# Patient Record
Sex: FEMALE | Race: BLACK OR AFRICAN AMERICAN | Employment: OTHER | URBAN - METROPOLITAN AREA
[De-identification: names, ages, dates, MRNs, and addresses within clinical notes are randomized per-mention and may not be internally consistent; named-entity substitution may affect disease eponyms.]

---

## 2017-01-16 RX ORDER — AMLODIPINE BESYLATE 5 MG/1
TABLET ORAL
Qty: 90 TABLET | Refills: 0 | Status: SHIPPED | OUTPATIENT
Start: 2017-01-16 | End: 2017-04-12

## 2017-02-07 RX ORDER — TEMAZEPAM 15 MG/1
CAPSULE ORAL
Qty: 30 CAPSULE | Refills: 0 | Status: SHIPPED | OUTPATIENT
Start: 2017-02-07 | End: 2017-02-09

## 2017-02-08 ENCOUNTER — TELEPHONE (OUTPATIENT)
Dept: INTERNAL MEDICINE CLINIC | Facility: CLINIC | Age: 72
End: 2017-02-08

## 2017-02-08 NOTE — TELEPHONE ENCOUNTER
Received a fax from [employer] stating that temazepam is not covered but covered alternative trazodone 50 mg 1 tab qhs is covered. Ok to change RX?

## 2017-02-09 RX ORDER — TRAZODONE HYDROCHLORIDE 50 MG/1
50 TABLET ORAL NIGHTLY
Qty: 30 TABLET | Refills: 0 | Status: SHIPPED | OUTPATIENT
Start: 2017-02-09 | End: 2018-11-29 | Stop reason: ALTCHOICE

## 2017-02-09 NOTE — TELEPHONE ENCOUNTER
RX for trazodone sent to pharmacy. [facility] contacted, notified to cancel RX for temazepam and LM on approved HIPAA number notifying pt of the change in RX.

## 2017-04-10 PROBLEM — E66.01 SEVERE OBESITY (BMI >= 40) (HCC): Chronic | Status: ACTIVE | Noted: 2017-04-10

## 2017-04-10 RX ORDER — TRIAMTERENE AND HYDROCHLOROTHIAZIDE 37.5; 25 MG/1; MG/1
CAPSULE ORAL
Qty: 90 CAPSULE | Refills: 0 | OUTPATIENT
Start: 2017-04-10

## 2017-04-10 RX ORDER — AMLODIPINE BESYLATE 5 MG/1
TABLET ORAL
Qty: 90 TABLET | Refills: 0 | OUTPATIENT
Start: 2017-04-10

## 2017-04-12 RX ORDER — AMLODIPINE BESYLATE 5 MG/1
5 TABLET ORAL
Qty: 90 TABLET | Refills: 0 | Status: SHIPPED | OUTPATIENT
Start: 2017-04-12 | End: 2017-05-08

## 2017-04-14 ENCOUNTER — OFFICE VISIT (OUTPATIENT)
Dept: INTERNAL MEDICINE CLINIC | Facility: CLINIC | Age: 72
End: 2017-04-14

## 2017-04-14 VITALS
WEIGHT: 264 LBS | OXYGEN SATURATION: 99 % | HEIGHT: 64 IN | BODY MASS INDEX: 45.07 KG/M2 | HEART RATE: 61 BPM | RESPIRATION RATE: 16 BRPM | DIASTOLIC BLOOD PRESSURE: 90 MMHG | SYSTOLIC BLOOD PRESSURE: 160 MMHG

## 2017-04-14 DIAGNOSIS — I10 ESSENTIAL HYPERTENSION, BENIGN: Primary | ICD-10-CM

## 2017-04-14 DIAGNOSIS — E78.00 PURE HYPERCHOLESTEROLEMIA: ICD-10-CM

## 2017-04-14 PROCEDURE — 99213 OFFICE O/P EST LOW 20 MIN: CPT | Performed by: INTERNAL MEDICINE

## 2017-04-14 RX ORDER — TRIAMTERENE AND HYDROCHLOROTHIAZIDE 37.5; 25 MG/1; MG/1
CAPSULE ORAL
Qty: 90 CAPSULE | Refills: 0 | Status: SHIPPED | OUTPATIENT
Start: 2017-04-14 | End: 2017-04-20

## 2017-04-14 NOTE — PROGRESS NOTES
Nelli Hughes [facility] 1945 is a 80year old female. Patient presents with:   Follow - Up       HPI:   BP check    Current Outpatient Prescriptions:  Triamterene-HCTZ 37.5-25 MG Oral Cap TAKE 1 CAPSULE BY MOUTH EVERY MORNING Disp: 90 capsule Rfl: 0 Ears: unremarkable. Mouth: unremarkable. Nasal septum: midline. Pharynx: normal.   Sinuses: non-tender. HEART:   Clicks: no.   Distal Pulses Palpable: yes. Edema: none visible . Gallop: no .   Heart sounds: normal S1S2. Murmurs: none.    Rhy

## 2017-04-17 ENCOUNTER — APPOINTMENT (OUTPATIENT)
Dept: LAB | Age: 72
End: 2017-04-17
Attending: INTERNAL MEDICINE
Payer: MEDICARE

## 2017-04-17 DIAGNOSIS — E78.00 PURE HYPERCHOLESTEROLEMIA: ICD-10-CM

## 2017-04-17 DIAGNOSIS — I10 ESSENTIAL HYPERTENSION, BENIGN: ICD-10-CM

## 2017-04-17 PROCEDURE — 80048 BASIC METABOLIC PNL TOTAL CA: CPT

## 2017-04-17 PROCEDURE — 80061 LIPID PANEL: CPT

## 2017-04-17 PROCEDURE — 36415 COLL VENOUS BLD VENIPUNCTURE: CPT

## 2017-04-20 ENCOUNTER — OFFICE VISIT (OUTPATIENT)
Dept: INTERNAL MEDICINE CLINIC | Facility: CLINIC | Age: 72
End: 2017-04-20

## 2017-04-20 VITALS
HEIGHT: 64 IN | WEIGHT: 264 LBS | RESPIRATION RATE: 17 BRPM | DIASTOLIC BLOOD PRESSURE: 78 MMHG | OXYGEN SATURATION: 99 % | BODY MASS INDEX: 45.07 KG/M2 | HEART RATE: 58 BPM | SYSTOLIC BLOOD PRESSURE: 138 MMHG

## 2017-04-20 DIAGNOSIS — E87.6 HYPOKALEMIA: ICD-10-CM

## 2017-04-20 DIAGNOSIS — I10 ESSENTIAL HYPERTENSION, BENIGN: Primary | ICD-10-CM

## 2017-04-20 PROCEDURE — 99213 OFFICE O/P EST LOW 20 MIN: CPT | Performed by: INTERNAL MEDICINE

## 2017-04-20 NOTE — PROGRESS NOTES
Radha Sandoval [facility] 1945 is a 80year old female. Patient presents with: Follow - Up       HPI:   BP check  Patient did not take any medicines today    Current Outpatient Prescriptions:   AmLODIPine Besylate 5 MG Oral Tab Take 1 tablet (5 mg total midline. Pharynx: normal.   Sinuses: non-tender. HEART:   Clicks: no.   Distal Pulses Palpable: yes. Edema: trace. Gallop: no .   Heart sounds: normal S1S2. Murmurs: none. Rhythm: regular. LUNGS:   Airflow: normal air movement.    Auscultation

## 2017-05-08 ENCOUNTER — OFFICE VISIT (OUTPATIENT)
Dept: INTERNAL MEDICINE CLINIC | Facility: CLINIC | Age: 72
End: 2017-05-08

## 2017-05-08 VITALS
BODY MASS INDEX: 46.1 KG/M2 | SYSTOLIC BLOOD PRESSURE: 156 MMHG | OXYGEN SATURATION: 96 % | HEART RATE: 66 BPM | DIASTOLIC BLOOD PRESSURE: 90 MMHG | RESPIRATION RATE: 20 BRPM | WEIGHT: 270 LBS | HEIGHT: 64 IN | TEMPERATURE: 99 F

## 2017-05-08 DIAGNOSIS — Z00.00 ROUTINE GENERAL MEDICAL EXAMINATION AT A HEALTH CARE FACILITY: ICD-10-CM

## 2017-05-08 DIAGNOSIS — I10 ESSENTIAL HYPERTENSION, BENIGN: Primary | ICD-10-CM

## 2017-05-08 PROCEDURE — 99213 OFFICE O/P EST LOW 20 MIN: CPT | Performed by: INTERNAL MEDICINE

## 2017-05-08 RX ORDER — FUROSEMIDE 40 MG/1
40 TABLET ORAL DAILY
Qty: 30 TABLET | Refills: 3 | Status: SHIPPED | OUTPATIENT
Start: 2017-05-08 | End: 2017-09-03

## 2017-05-08 RX ORDER — POTASSIUM CHLORIDE 1500 MG/1
TABLET, FILM COATED, EXTENDED RELEASE ORAL
Qty: 60 TABLET | Refills: 3 | Status: SHIPPED | OUTPATIENT
Start: 2017-05-08 | End: 2017-09-06

## 2017-05-08 RX ORDER — AMLODIPINE BESYLATE 10 MG/1
10 TABLET ORAL
Qty: 30 TABLET | Refills: 2 | Status: SHIPPED | OUTPATIENT
Start: 2017-05-08 | End: 2017-07-31

## 2017-05-08 NOTE — PROGRESS NOTES
Jose Alfredo [facility] 1945 is a 80year old female. Patient presents with: Follow - Up       HPI:   BP check      Current Outpatient Prescriptions: AmLODIPine Besylate 10 MG Oral Tab Take 1 tablet (10 mg total) by mouth once daily.  Disp: 30 tablet 270 lb  BMI 46.32 kg/m2  SpO2 96%  HEENT:   jvp not raised. Ear canals: normal.   Ear drums: normal .   Ears: unremarkable. Mouth: unremarkable. Nasal septum: midline. Pharynx: normal.   Sinuses: non-tender.    HEART:   Clicks: no.   Distal Pulses P

## 2017-05-26 ENCOUNTER — APPOINTMENT (OUTPATIENT)
Dept: LAB | Age: 72
End: 2017-05-26
Attending: INTERNAL MEDICINE
Payer: MEDICARE

## 2017-05-26 DIAGNOSIS — E87.6 HYPOKALEMIA: ICD-10-CM

## 2017-05-26 PROCEDURE — 80048 BASIC METABOLIC PNL TOTAL CA: CPT

## 2017-05-26 PROCEDURE — 36415 COLL VENOUS BLD VENIPUNCTURE: CPT

## 2017-05-30 ENCOUNTER — OFFICE VISIT (OUTPATIENT)
Dept: INTERNAL MEDICINE CLINIC | Facility: CLINIC | Age: 72
End: 2017-05-30

## 2017-05-30 VITALS
BODY MASS INDEX: 44.56 KG/M2 | HEART RATE: 76 BPM | RESPIRATION RATE: 16 BRPM | SYSTOLIC BLOOD PRESSURE: 130 MMHG | WEIGHT: 261 LBS | OXYGEN SATURATION: 99 % | DIASTOLIC BLOOD PRESSURE: 80 MMHG | TEMPERATURE: 98 F | HEIGHT: 64 IN

## 2017-05-30 DIAGNOSIS — I10 ESSENTIAL HYPERTENSION, BENIGN: Primary | ICD-10-CM

## 2017-05-30 PROCEDURE — 99213 OFFICE O/P EST LOW 20 MIN: CPT | Performed by: INTERNAL MEDICINE

## 2017-05-30 NOTE — PROGRESS NOTES
Crystal Holly  1945 is a 80year old female. Patient presents with: Follow - Up: lab       HPI:   BP check      Current Outpatient Prescriptions: AmLODIPine Besylate 10 MG Oral Tab Take 1 tablet (10 mg total) by mouth once daily.  Disp: 30 t Temp(Src) 97.8 °F (36.6 °C) (Oral)  Resp 16  Ht 64\"  Wt 261 lb  BMI 44.78 kg/m2  SpO2 99%  HEENT:   jvp not raised. Ear canals: normal.   Ear drums: normal .   Ears: unremarkable. Mouth: unremarkable. Nasal septum: midline.    Pharynx: normal.   Sinu

## 2017-07-31 DIAGNOSIS — I10 ESSENTIAL HYPERTENSION, BENIGN: ICD-10-CM

## 2017-07-31 RX ORDER — AMLODIPINE BESYLATE 10 MG/1
TABLET ORAL
Qty: 30 TABLET | Refills: 0 | Status: SHIPPED | OUTPATIENT
Start: 2017-07-31 | End: 2017-09-20

## 2017-08-24 ENCOUNTER — TELEPHONE (OUTPATIENT)
Dept: INTERNAL MEDICINE CLINIC | Facility: CLINIC | Age: 72
End: 2017-08-24

## 2017-08-25 ENCOUNTER — OFFICE VISIT (OUTPATIENT)
Dept: INTERNAL MEDICINE CLINIC | Facility: CLINIC | Age: 72
End: 2017-08-25

## 2017-08-25 VITALS
TEMPERATURE: 98 F | HEIGHT: 64 IN | DIASTOLIC BLOOD PRESSURE: 88 MMHG | OXYGEN SATURATION: 97 % | BODY MASS INDEX: 44.56 KG/M2 | HEART RATE: 66 BPM | WEIGHT: 261 LBS | SYSTOLIC BLOOD PRESSURE: 152 MMHG | RESPIRATION RATE: 16 BRPM

## 2017-08-25 DIAGNOSIS — Z00.00 BLOOD TESTS FOR ROUTINE GENERAL PHYSICAL EXAMINATION: ICD-10-CM

## 2017-08-25 DIAGNOSIS — I10 ESSENTIAL HYPERTENSION, BENIGN: Primary | ICD-10-CM

## 2017-08-25 DIAGNOSIS — Z12.39 SCREENING FOR BREAST CANCER: ICD-10-CM

## 2017-08-25 PROCEDURE — 99213 OFFICE O/P EST LOW 20 MIN: CPT | Performed by: INTERNAL MEDICINE

## 2017-08-25 PROCEDURE — 93000 ELECTROCARDIOGRAM COMPLETE: CPT | Performed by: INTERNAL MEDICINE

## 2017-08-25 RX ORDER — LOSARTAN POTASSIUM 50 MG/1
50 TABLET ORAL DAILY
Qty: 30 TABLET | Refills: 2 | Status: SHIPPED | OUTPATIENT
Start: 2017-08-25 | End: 2017-10-01

## 2017-08-25 NOTE — PROGRESS NOTES
Octavio Izquierdo  1945 is a 71year old female. Patient presents with:  Irregular Heart Beat       HPI:   BP check  Patient apparently was set up for colonoscopy on . Patient was called for a preprocedure consultation at the [facility].   Appa Smokeless tobacco: Never Used                      Alcohol use: Yes              Comment: social       REVIEW OF SYSTEMS:   Cardiovascular:   Syncope none. Rapid heart beat at rest no. Change in exercise tolerance no. Chest pain no.  Chest pain while aw about 4 weeks (around 9/22/2017). Bebeto Larry MD

## 2017-08-28 ENCOUNTER — LAB ENCOUNTER (OUTPATIENT)
Dept: LAB | Age: 72
End: 2017-08-28
Attending: INTERNAL MEDICINE
Payer: MEDICARE

## 2017-08-28 DIAGNOSIS — Z00.00 BLOOD TESTS FOR ROUTINE GENERAL PHYSICAL EXAMINATION: ICD-10-CM

## 2017-08-28 LAB
25-HYDROXYVITAMIN D (TOTAL): 47.7 NG/ML (ref 30–100)
ALBUMIN SERPL-MCNC: 3.5 G/DL (ref 3.5–4.8)
ALP LIVER SERPL-CCNC: 75 U/L (ref 55–142)
ALT SERPL-CCNC: 20 U/L (ref 14–54)
AST SERPL-CCNC: 13 U/L (ref 15–41)
BASOPHILS # BLD AUTO: 0.04 X10(3) UL (ref 0–0.1)
BASOPHILS NFR BLD AUTO: 0.7 %
BILIRUB SERPL-MCNC: 0.6 MG/DL (ref 0.1–2)
BILIRUB UR QL STRIP.AUTO: NEGATIVE
BUN BLD-MCNC: 17 MG/DL (ref 8–20)
CALCIUM BLD-MCNC: 9.6 MG/DL (ref 8.3–10.3)
CHLORIDE: 108 MMOL/L (ref 101–111)
CHOLEST SMN-MCNC: 187 MG/DL (ref ?–200)
CO2: 27 MMOL/L (ref 22–32)
COLOR UR AUTO: YELLOW
CREAT BLD-MCNC: 0.89 MG/DL (ref 0.55–1.02)
EOSINOPHIL # BLD AUTO: 0.15 X10(3) UL (ref 0–0.3)
EOSINOPHIL NFR BLD AUTO: 2.6 %
ERYTHROCYTE [DISTWIDTH] IN BLOOD BY AUTOMATED COUNT: 15.5 % (ref 11.5–16)
EST. AVERAGE GLUCOSE BLD GHB EST-MCNC: 114 MG/DL (ref 68–126)
GLUCOSE BLD-MCNC: 85 MG/DL (ref 70–99)
GLUCOSE UR STRIP.AUTO-MCNC: NEGATIVE MG/DL
HBA1C MFR BLD HPLC: 5.6 % (ref ?–5.7)
HCT VFR BLD AUTO: 41.6 % (ref 34–50)
HDLC SERPL-MCNC: 66 MG/DL (ref 45–?)
HDLC SERPL: 2.83 {RATIO} (ref ?–4.44)
HGB BLD-MCNC: 13.1 G/DL (ref 12–16)
IMMATURE GRANULOCYTE COUNT: 0.01 X10(3) UL (ref 0–1)
IMMATURE GRANULOCYTE RATIO %: 0.2 %
KETONES UR STRIP.AUTO-MCNC: NEGATIVE MG/DL
LDLC SERPL CALC-MCNC: 110 MG/DL (ref ?–130)
LDLC SERPL-MCNC: 11 MG/DL (ref 5–40)
LYMPHOCYTES # BLD AUTO: 2.53 X10(3) UL (ref 0.9–4)
LYMPHOCYTES NFR BLD AUTO: 43.6 %
M PROTEIN MFR SERPL ELPH: 7.3 G/DL (ref 6.1–8.3)
MCH RBC QN AUTO: 26.6 PG (ref 27–33.2)
MCHC RBC AUTO-ENTMCNC: 31.5 G/DL (ref 31–37)
MCV RBC AUTO: 84.6 FL (ref 81–100)
MONOCYTES # BLD AUTO: 0.45 X10(3) UL (ref 0.1–0.6)
MONOCYTES NFR BLD AUTO: 7.8 %
NEUTROPHIL ABS PRELIM: 2.62 X10 (3) UL (ref 1.3–6.7)
NEUTROPHILS # BLD AUTO: 2.62 X10(3) UL (ref 1.3–6.7)
NEUTROPHILS NFR BLD AUTO: 45.1 %
NITRITE UR QL STRIP.AUTO: NEGATIVE
NONHDLC SERPL-MCNC: 121 MG/DL (ref ?–130)
PH UR STRIP.AUTO: 5 [PH] (ref 4.5–8)
PLATELET # BLD AUTO: 172 10(3)UL (ref 150–450)
POTASSIUM SERPL-SCNC: 4.2 MMOL/L (ref 3.6–5.1)
PROT UR STRIP.AUTO-MCNC: NEGATIVE MG/DL
RBC # BLD AUTO: 4.92 X10(6)UL (ref 3.8–5.1)
RBC UR QL AUTO: NEGATIVE
RED CELL DISTRIBUTION WIDTH-SD: 48 FL (ref 35.1–46.3)
SODIUM SERPL-SCNC: 143 MMOL/L (ref 136–144)
SP GR UR STRIP.AUTO: 1.02 (ref 1–1.03)
THYROXINE (T4): 9 UG/DL (ref 4.5–10.9)
TRIGLYCERIDES: 55 MG/DL (ref ?–150)
UROBILINOGEN UR STRIP.AUTO-MCNC: <2 MG/DL
WBC # BLD AUTO: 5.8 X10(3) UL (ref 4–13)

## 2017-08-28 PROCEDURE — 82306 VITAMIN D 25 HYDROXY: CPT

## 2017-08-28 PROCEDURE — 81001 URINALYSIS AUTO W/SCOPE: CPT

## 2017-08-28 PROCEDURE — 85025 COMPLETE CBC W/AUTO DIFF WBC: CPT

## 2017-08-28 PROCEDURE — 83036 HEMOGLOBIN GLYCOSYLATED A1C: CPT

## 2017-08-28 PROCEDURE — 84436 ASSAY OF TOTAL THYROXINE: CPT

## 2017-08-28 PROCEDURE — 80061 LIPID PANEL: CPT

## 2017-08-28 PROCEDURE — 80053 COMPREHEN METABOLIC PANEL: CPT

## 2017-08-28 PROCEDURE — 36415 COLL VENOUS BLD VENIPUNCTURE: CPT

## 2017-08-31 ENCOUNTER — OFFICE VISIT (OUTPATIENT)
Dept: INTERNAL MEDICINE CLINIC | Facility: CLINIC | Age: 72
End: 2017-08-31

## 2017-08-31 ENCOUNTER — TELEPHONE (OUTPATIENT)
Dept: INTERNAL MEDICINE CLINIC | Facility: CLINIC | Age: 72
End: 2017-08-31

## 2017-08-31 VITALS
HEIGHT: 64 IN | DIASTOLIC BLOOD PRESSURE: 88 MMHG | HEART RATE: 76 BPM | RESPIRATION RATE: 16 BRPM | TEMPERATURE: 98 F | OXYGEN SATURATION: 98 % | WEIGHT: 261 LBS | BODY MASS INDEX: 44.56 KG/M2 | SYSTOLIC BLOOD PRESSURE: 128 MMHG

## 2017-08-31 DIAGNOSIS — I10 ESSENTIAL HYPERTENSION, BENIGN: Primary | ICD-10-CM

## 2017-08-31 PROCEDURE — 99213 OFFICE O/P EST LOW 20 MIN: CPT | Performed by: INTERNAL MEDICINE

## 2017-08-31 RX ORDER — SODIUM CHLORIDE, SODIUM LACTATE, POTASSIUM CHLORIDE, CALCIUM CHLORIDE 600; 310; 30; 20 MG/100ML; MG/100ML; MG/100ML; MG/100ML
INJECTION, SOLUTION INTRAVENOUS CONTINUOUS
Status: CANCELLED | OUTPATIENT
Start: 2017-08-31

## 2017-08-31 NOTE — TELEPHONE ENCOUNTER
Maureen Baker from [facility] called to request EKG be faxed over to her.  Fax# [phone]

## 2017-08-31 NOTE — PROGRESS NOTES
Leelee Lin [facility] 1945 is a 71year old female. Patient presents with: Follow - Up       HPI:       Current Outpatient Prescriptions:  losartan 50 MG Oral Tab Take 1 tablet (50 mg total) by mouth daily.  Disp: 30 tablet Rfl: 2   AMLODIPINE BESYL Irregular heart beat no. Leg edema no. Murmurs no. Orthopnea no. EXAM:   /88   Pulse 76   Temp 98.1 °F (36.7 °C) (Oral)   Resp 16   Ht 64\"   Wt 261 lb   SpO2 98%   BMI 44.80 kg/m²   HEENT:   jvp not raised.    Ear canals: normal.   Ear drums: no

## 2017-09-01 ENCOUNTER — HOSPITAL ENCOUNTER (OUTPATIENT)
Dept: MAMMOGRAPHY | Age: 72
Discharge: HOME | End: 2017-09-01
Attending: INTERNAL MEDICINE
Payer: MEDICARE

## 2017-09-01 DIAGNOSIS — Z12.39 SCREENING FOR BREAST CANCER: ICD-10-CM

## 2017-09-01 PROCEDURE — 77067 SCR MAMMO BI INCL CAD: CPT | Performed by: INTERNAL MEDICINE

## 2017-09-03 DIAGNOSIS — I10 ESSENTIAL HYPERTENSION, BENIGN: ICD-10-CM

## 2017-09-05 RX ORDER — FUROSEMIDE 40 MG/1
TABLET ORAL
Qty: 90 TABLET | Refills: 0 | Status: SHIPPED | OUTPATIENT
Start: 2017-09-05 | End: 2017-12-07

## 2017-09-06 DIAGNOSIS — I10 ESSENTIAL HYPERTENSION, BENIGN: ICD-10-CM

## 2017-09-06 NOTE — TELEPHONE ENCOUNTER
From: Radha Sandoval  Sent: 9/6/2017 1:32 PM CDT  Subject: Medication Renewal Request    Dory Townsend would like a refill of the following medications:  Potassium Chloride ER 20 MEQ Oral Tab CR Galindo Marie MD]    Preferred pharmacy: [address]

## 2017-09-06 NOTE — TELEPHONE ENCOUNTER
Medication is not part of protocol list. Please approve if appropriate. Last OV 8/31/17. Last refill 5/8/17 #60 with 3 refills.      Last 8/28/17  Potassium 3.6 - 5.1 mmol/L 4.2

## 2017-09-07 DIAGNOSIS — I10 ESSENTIAL HYPERTENSION, BENIGN: ICD-10-CM

## 2017-09-07 RX ORDER — POTASSIUM CHLORIDE 1500 MG/1
TABLET, FILM COATED, EXTENDED RELEASE ORAL
Qty: 60 TABLET | Refills: 0 | OUTPATIENT
Start: 2017-09-07

## 2017-09-07 RX ORDER — POTASSIUM CHLORIDE 1500 MG/1
TABLET, FILM COATED, EXTENDED RELEASE ORAL
Qty: 60 TABLET | Refills: 0 | Status: SHIPPED | OUTPATIENT
Start: 2017-09-07 | End: 2017-10-05

## 2017-09-08 ENCOUNTER — ANESTHESIA EVENT (OUTPATIENT)
Dept: ENDOSCOPY | Facility: HOSPITAL | Age: 72
End: 2017-09-08
Payer: MEDICARE

## 2017-09-14 ENCOUNTER — HOSPITAL ENCOUNTER (OUTPATIENT)
Dept: MAMMOGRAPHY | Facility: HOSPITAL | Age: 72
Discharge: HOME | End: 2017-09-14
Attending: INTERNAL MEDICINE
Payer: MEDICARE

## 2017-09-14 DIAGNOSIS — R92.2 INCONCLUSIVE MAMMOGRAM: ICD-10-CM

## 2017-09-14 PROCEDURE — 77065 DX MAMMO INCL CAD UNI: CPT | Performed by: INTERNAL MEDICINE

## 2017-09-14 NOTE — PROGRESS NOTES
Abnormal   Impression    CONCLUSION:     1. There is a new grouping of calcifications at the 11:00 position, these would be amenable to stereotactic guided biopsy.   2. The results were discussed with the patient at the time of interpretation.     [employer]

## 2017-09-15 ENCOUNTER — OFFICE VISIT (OUTPATIENT)
Dept: INTERNAL MEDICINE CLINIC | Facility: CLINIC | Age: 72
End: 2017-09-15

## 2017-09-15 VITALS
HEIGHT: 64 IN | HEART RATE: 76 BPM | OXYGEN SATURATION: 98 % | SYSTOLIC BLOOD PRESSURE: 138 MMHG | DIASTOLIC BLOOD PRESSURE: 82 MMHG | RESPIRATION RATE: 16 BRPM | TEMPERATURE: 98 F | BODY MASS INDEX: 44.56 KG/M2 | WEIGHT: 261 LBS

## 2017-09-15 DIAGNOSIS — Z00.00 ROUTINE GENERAL MEDICAL EXAMINATION AT A HEALTH CARE FACILITY: Primary | ICD-10-CM

## 2017-09-15 DIAGNOSIS — G47.33 OBSTRUCTIVE SLEEP APNEA: ICD-10-CM

## 2017-09-15 DIAGNOSIS — R92.8 ABNORMAL MAMMOGRAM OF RIGHT BREAST: ICD-10-CM

## 2017-09-15 DIAGNOSIS — E78.00 PURE HYPERCHOLESTEROLEMIA: ICD-10-CM

## 2017-09-15 DIAGNOSIS — I10 ESSENTIAL HYPERTENSION, BENIGN: ICD-10-CM

## 2017-09-15 PROCEDURE — 90686 IIV4 VACC NO PRSV 0.5 ML IM: CPT | Performed by: INTERNAL MEDICINE

## 2017-09-15 PROCEDURE — 90670 PCV13 VACCINE IM: CPT | Performed by: INTERNAL MEDICINE

## 2017-09-15 PROCEDURE — 96160 PT-FOCUSED HLTH RISK ASSMT: CPT | Performed by: INTERNAL MEDICINE

## 2017-09-15 PROCEDURE — G0008 ADMIN INFLUENZA VIRUS VAC: HCPCS | Performed by: INTERNAL MEDICINE

## 2017-09-15 PROCEDURE — G0009 ADMIN PNEUMOCOCCAL VACCINE: HCPCS | Performed by: INTERNAL MEDICINE

## 2017-09-15 PROCEDURE — G0439 PPPS, SUBSEQ VISIT: HCPCS | Performed by: INTERNAL MEDICINE

## 2017-09-15 NOTE — PATIENT INSTRUCTIONS
Elvina Mcburney [facility] SCREENING SCHEDULE   Tests on this list are recommended by your physician but may not be covered, or covered at this frequency, by your insurer. Please check with your insurance carrier before scheduling to verify coverage.        PREV 09/15/17  -PNEUMOCOCCAL VACC, 13 JOSELIN IM    Update Immunization Activity if applicable    Hepatitis B No orders found for this or any previous visit. Update Immunization Activity if applicable    Tetanus No orders found for this or any previous visit.  Rikki Houston

## 2017-09-15 NOTE — PROGRESS NOTES
Jalil Freeman is a 71year old female who presents for a Medicare Annual Wellness visit.     female    Patient Care Team: Patient Care Team:  Darcy Morales MD as PCP - General (Internal Medicine)    Patient Active Problem List:     Pure hypercholestero Lab Results  Component Value Date   TRIG 55 08/28/2017   TRIG 70 04/17/2017   TRIG 89 09/02/2016       Lab Results  Component Value Date    08/28/2017    04/17/2017    09/02/2016       Lab Results  Component Value Date   AST 13 ( more medical conditions?: 1-Yes    Have you fallen in the last 12 months?: 0-No    Do you accidently lose urine?: 0-No    Do you have difficulty seeing?: 0-No    Do you have any difficulty walking or getting up?: 0-No    Do you have any tripping hazards?: visit. No flowsheet data found. Fecal Occult Blood Annually No results found for: FOB, OCCULTSTOOL No flowsheet data found.     Glaucoma Screening      Ophthalmology Visit Annually yes    Bone Density Screening      Dexascan Every two years Last Dexa Sc Creat/alb ratio  Annually      LDL  Annually LDL Cholesterol (mg/dL)   Date Value   08/28/2017 110    No flowsheet data found. Dilated Eye exam  Annually No flowsheet data found. No flowsheet data found.     COPD      Spirometry Testing Annually No res CHOLECYSTECTOMY  1973: HYSTERECTOMY      Comment: partial hystero.   1973: OOPHORECTOMY  07/19/2013: XR DEXA BONE DENSITY, AXIAL & SCAN OF WRIST(CP*   Family History   Problem Relation Age of Onset   • Cancer Sister      breast   • Breast Cancer Sister 59 Breast lumps or discharge none. Mammogram up-to-date yes. Await biopsy  Genitourinary:   Patient denies difficulty urinating, frequent urination, hematuria. Dysuria none. Nocturia None. Urinary frequency no. Urinary incontinence no.    Musculoskeletal:   Ar Narrow dion pharynx   Pupils: normal, bilaterally . Sclera: normal.   Turbinates: normal.   NECK:   Carotid bruit: none. Cervical lymph nodes: unremarkable. JVD: none. Range of Motion: normal.   Thyroid: unremarkable. HEART:   Clicks: absent .    E INFLUENZA VIRUS VACCINE, QUAD, PRESERVATIVE FREE, 0.5 ML    Pure hypercholesterolemia-stable on medicines    Essential hypertension, benign-stable  Obstructive sleep apnea-stable on  ASHWINI mask    Abnormal mammogram of right breast  -     MCKENZIE BIOPSY STEREOTA

## 2017-09-20 ENCOUNTER — HOSPITAL ENCOUNTER (OUTPATIENT)
Dept: MAMMOGRAPHY | Facility: HOSPITAL | Age: 72
Discharge: HOME | End: 2017-09-20
Attending: INTERNAL MEDICINE
Payer: MEDICARE

## 2017-09-20 DIAGNOSIS — R92.8 ABNORMAL MAMMOGRAM OF RIGHT BREAST: ICD-10-CM

## 2017-09-20 DIAGNOSIS — I10 ESSENTIAL HYPERTENSION, BENIGN: ICD-10-CM

## 2017-09-20 PROCEDURE — 88305 TISSUE EXAM BY PATHOLOGIST: CPT | Performed by: INTERNAL MEDICINE

## 2017-09-20 PROCEDURE — 19081 BX BREAST 1ST LESION STRTCTC: CPT | Performed by: INTERNAL MEDICINE

## 2017-09-20 RX ORDER — AMLODIPINE BESYLATE 10 MG/1
TABLET ORAL
Qty: 30 TABLET | Refills: 0 | Status: SHIPPED | OUTPATIENT
Start: 2017-09-20 | End: 2017-10-01

## 2017-09-21 ENCOUNTER — PATIENT MESSAGE (OUTPATIENT)
Dept: INTERNAL MEDICINE CLINIC | Facility: CLINIC | Age: 72
End: 2017-09-21

## 2017-09-21 ENCOUNTER — TELEPHONE (OUTPATIENT)
Dept: INTERNAL MEDICINE CLINIC | Facility: CLINIC | Age: 72
End: 2017-09-21

## 2017-09-21 NOTE — TELEPHONE ENCOUNTER
Pt called in stating that her R deltoid is red, swollen and warm to the touch. Denies SOB/wheezing/swelling of the face, lips or tongue. Approximately the size of \"2 quarters\".  Advised to use warm compressed, OTC, Tylenol or Advil for aches or pains

## 2017-09-21 NOTE — TELEPHONE ENCOUNTER
From: Jalil Freeman  To: Darcy Morales MD  Sent: 9/21/2017 1:55 PM CDT  Subject: Non-Urgent Medical Question    My right arm where I got a shot is red , puffy ,hot, slightly sore and itchy. . I don't know if it's the Flu or Pneumonia shot because I got

## 2017-09-22 ENCOUNTER — TELEPHONE (OUTPATIENT)
Dept: INTERNAL MEDICINE CLINIC | Facility: CLINIC | Age: 72
End: 2017-09-22

## 2017-09-22 ENCOUNTER — TELEPHONE (OUTPATIENT)
Dept: MAMMOGRAPHY | Facility: HOSPITAL | Age: 72
End: 2017-09-22

## 2017-09-22 RX ORDER — CEPHALEXIN 500 MG/1
500 CAPSULE ORAL 3 TIMES DAILY
Qty: 30 CAPSULE | Refills: 0 | Status: SHIPPED | OUTPATIENT
Start: 2017-09-22 | End: 2017-10-02

## 2017-09-22 NOTE — TELEPHONE ENCOUNTER
Rash at the site of her Prevnar vaccine. On examination patient has local induration and inflammation about 2 inches in diameter.   I asked to apply warm compresses take Benadryl over-the-counter recheck on Monday   with send antibiotic in the meantime

## 2017-09-29 ENCOUNTER — SURGERY (OUTPATIENT)
Age: 72
End: 2017-09-29

## 2017-09-29 ENCOUNTER — ANESTHESIA (OUTPATIENT)
Dept: ENDOSCOPY | Facility: HOSPITAL | Age: 72
End: 2017-09-29
Payer: MEDICARE

## 2017-09-29 ENCOUNTER — HOSPITAL ENCOUNTER (OUTPATIENT)
Facility: HOSPITAL | Age: 72
Setting detail: HOSPITAL OUTPATIENT SURGERY
Discharge: HOME | End: 2017-09-29
Attending: STUDENT IN AN ORGANIZED HEALTH CARE EDUCATION/TRAINING PROGRAM | Admitting: STUDENT IN AN ORGANIZED HEALTH CARE EDUCATION/TRAINING PROGRAM
Payer: MEDICARE

## 2017-09-29 VITALS
SYSTOLIC BLOOD PRESSURE: 141 MMHG | RESPIRATION RATE: 18 BRPM | DIASTOLIC BLOOD PRESSURE: 64 MMHG | HEIGHT: 64 IN | WEIGHT: 264 LBS | TEMPERATURE: 98 F | BODY MASS INDEX: 45.07 KG/M2 | OXYGEN SATURATION: 100 % | HEART RATE: 53 BPM

## 2017-09-29 PROCEDURE — 0DBL8ZX EXCISION OF TRANSVERSE COLON, VIA NATURAL OR ARTIFICIAL OPENING ENDOSCOPIC, DIAGNOSTIC: ICD-10-PCS | Performed by: STUDENT IN AN ORGANIZED HEALTH CARE EDUCATION/TRAINING PROGRAM

## 2017-09-29 PROCEDURE — 88305 TISSUE EXAM BY PATHOLOGIST: CPT | Performed by: STUDENT IN AN ORGANIZED HEALTH CARE EDUCATION/TRAINING PROGRAM

## 2017-09-29 PROCEDURE — 0DB68ZX EXCISION OF STOMACH, VIA NATURAL OR ARTIFICIAL OPENING ENDOSCOPIC, DIAGNOSTIC: ICD-10-PCS | Performed by: STUDENT IN AN ORGANIZED HEALTH CARE EDUCATION/TRAINING PROGRAM

## 2017-09-29 RX ORDER — SODIUM CHLORIDE, SODIUM LACTATE, POTASSIUM CHLORIDE, CALCIUM CHLORIDE 600; 310; 30; 20 MG/100ML; MG/100ML; MG/100ML; MG/100ML
INJECTION, SOLUTION INTRAVENOUS CONTINUOUS
Status: DISCONTINUED | OUTPATIENT
Start: 2017-09-29 | End: 2017-09-29

## 2017-09-29 RX ORDER — SODIUM CHLORIDE, SODIUM LACTATE, POTASSIUM CHLORIDE, CALCIUM CHLORIDE 600; 310; 30; 20 MG/100ML; MG/100ML; MG/100ML; MG/100ML
INJECTION, SOLUTION INTRAVENOUS CONTINUOUS
Status: CANCELLED | OUTPATIENT
Start: 2017-09-29

## 2017-09-29 RX ORDER — RANITIDINE 150 MG/1
150 TABLET ORAL EVERY 12 HOURS
Qty: 60 TABLET | Refills: 2 | Status: SHIPPED | OUTPATIENT
Start: 2017-09-29 | End: 2017-12-28

## 2017-09-29 NOTE — OPERATIVE REPORT
Colon operative report  Patient Name: Nathan Duran  Procedure: Colonoscopy with polypectomy  Indication: surveillance  Attending: Jael Moses M.D. Consent: The risks, benefits, and alternatives were discussed with the patient / POA.   Risks included the proximal transverse colon. These were removed with a cold forceps. - Internal hemorrhoids (grade 2). Impression: Findings as above.     Recommendations:     - Repeat colonoscopy in 5 years, based on her health at that time     Shonda Cervantes MD  Sub

## 2017-09-29 NOTE — OPERATIVE REPORT
EGD operative report  Patient Name: Petra Terrazas  Procedure: Esophagogastroduodenoscopy with biopsy   Indication: worsening reflux  Attending: Ruth Burnett M.D. Consent:  The risks, benefits, and alternatives were discussed with the patient / POA. Erosive antral gastritis  Recommendations:    - Await biopsy results   - Continue PPI therapy, once-daily dosing   - Avoid NSAIDs    Mian Young MD

## 2017-09-29 NOTE — H&P
Gastroenterology H/P  I have personally seen and examined the patient. Patient Name: Dmitry Berry  CC: reflux  HPI: Mrs David Arechiga is a 72 yo female with a history of worsening reflux for the last 3 weeks and is also due for her follow-up colonoscopy. Dermatologic: The patient reports no recent rashes or chronic skin disorders            Rheumatologic: The patient reports no history of chronic arthritis, myalgias, arthralgias            Genitourinary:  The patient reports no history of recurrent urinary LABALBU    Impression:   1) Surveillance colonoscopy  2) Worsening GERD  3) Obesity with ASHWINI    Recommendations:    - EGD and colonoscopy with MAc   - The potentially life-threatening complication of sedation, bleeding, and perforation were reviewed along

## 2017-09-29 NOTE — ANESTHESIA PREPROCEDURE EVALUATION
PRE-OP EVALUATION    Patient Name: Madonna Adam    Pre-op Diagnosis: PERSONAL HISTORY OF COLONIC POLYPS, HEARTBURN    Procedure(s):  ESOPHAGOGASTRODUODENOSCOPY (EGD), COLONOSCOPY      Surgeon(s) and Role:     * Tani Espinosa MD - Primary    Pre-o tablet by mouth daily. Take with a meal Disp:  Rfl:        Allergies: Ace Inhibitors      Anesthesia Evaluation    Patient summary reviewed.     Anesthetic Complications  (-) history of anesthetic complications         GI/Hepatic/Renal

## 2017-09-29 NOTE — ANESTHESIA POSTPROCEDURE EVALUATION
2025 [facility] Patient Status:  Hospital Outpatient Surgery   Age/Gender 71year old female MRN [id number]   [facility]. [facility] Funmi Prasad MD   Hosp Day # 0 PCP Wendy Shelton MD       Anesthesia Post-

## 2017-10-01 DIAGNOSIS — I10 ESSENTIAL HYPERTENSION, BENIGN: ICD-10-CM

## 2017-10-02 NOTE — TELEPHONE ENCOUNTER
From: Ari Gates  Sent: 10/1/2017 8:45 AM CDT  Subject: Medication Renewal Request    Eleni Fairchild.  Billie Vaz would like a refill of the following medications:     Potassium Chloride ER 20 MEQ Oral Tab CR Anne Marie Caldwell, PA]   Patient Comment: Please call

## 2017-10-05 ENCOUNTER — PATIENT MESSAGE (OUTPATIENT)
Dept: INTERNAL MEDICINE CLINIC | Facility: CLINIC | Age: 72
End: 2017-10-05

## 2017-10-05 DIAGNOSIS — I10 ESSENTIAL HYPERTENSION, BENIGN: ICD-10-CM

## 2017-10-05 RX ORDER — LOSARTAN POTASSIUM 50 MG/1
50 TABLET ORAL DAILY
Qty: 30 TABLET | Refills: 2 | Status: SHIPPED
Start: 2017-10-05 | End: 2017-12-07

## 2017-10-05 RX ORDER — AMLODIPINE BESYLATE 10 MG/1
10 TABLET ORAL DAILY
Qty: 30 TABLET | Refills: 0 | Status: SHIPPED
Start: 2017-10-05 | End: 2017-12-07

## 2017-10-05 NOTE — TELEPHONE ENCOUNTER
From: Brad Arthur  Sent: 10/1/2017 8:45 AM CDT  Subject: Medication Renewal Request    John Carrasco.  Jamey Lea would like a refill of the following medications:     losartan 50 MG Oral Tab [Abebe Martinez MD]   Patient Comment: Please call in for 60 days instead of 30     AMLODIPINE BESYLATE 10 MG Oral Tab Ryna Brice MD]   Patient Comment: Please call in for 60 days instead of 30    Preferred pharmacy: [address] [place] [address], [address] [facility]          Medication renewals requested in this message routed separately:     Potassium Chloride ER 20 MEQ Oral Tab CR LISA Gonzalez]   Patient Comment: Please call in for 60 days instead of 30

## 2017-10-06 DIAGNOSIS — I10 ESSENTIAL HYPERTENSION, BENIGN: ICD-10-CM

## 2017-10-06 RX ORDER — POTASSIUM CHLORIDE 1500 MG/1
TABLET, FILM COATED, EXTENDED RELEASE ORAL
Qty: 180 TABLET | Refills: 0 | Status: SHIPPED | OUTPATIENT
Start: 2017-10-06 | End: 2018-01-22

## 2017-10-06 RX ORDER — POTASSIUM CHLORIDE 1500 MG/1
TABLET, FILM COATED, EXTENDED RELEASE ORAL
Qty: 60 TABLET | Refills: 0 | Status: SHIPPED | OUTPATIENT
Start: 2017-10-06 | End: 2017-10-06

## 2017-10-06 RX ORDER — POTASSIUM CHLORIDE 1500 MG/1
TABLET, FILM COATED, EXTENDED RELEASE ORAL
Qty: 60 TABLET | Refills: 3 | Status: SHIPPED
Start: 2017-10-06 | End: 2018-01-07

## 2017-10-06 NOTE — TELEPHONE ENCOUNTER
From: Shikha Simmons  To:  Christ Armstrong MD  Sent: 10/5/2017 5:45 PM CDT  Subject: Prescription Question    [facility] has sent over a refill authorization twice and I entered the request on My Chart on Oct 1 and today Oct 5 for a refill for my olivia

## 2017-12-07 DIAGNOSIS — I10 ESSENTIAL HYPERTENSION, BENIGN: ICD-10-CM

## 2017-12-07 RX ORDER — FUROSEMIDE 40 MG/1
TABLET ORAL
Qty: 90 TABLET | Refills: 0 | Status: SHIPPED | OUTPATIENT
Start: 2017-12-07 | End: 2018-03-08

## 2017-12-07 RX ORDER — LOSARTAN POTASSIUM 50 MG/1
TABLET ORAL
Qty: 90 TABLET | Refills: 2 | Status: SHIPPED | OUTPATIENT
Start: 2017-12-07 | End: 2018-08-24

## 2017-12-07 RX ORDER — AMLODIPINE BESYLATE 10 MG/1
TABLET ORAL
Qty: 90 TABLET | Refills: 0 | Status: SHIPPED | OUTPATIENT
Start: 2017-12-07 | End: 2018-03-08

## 2018-01-07 DIAGNOSIS — I10 ESSENTIAL HYPERTENSION, BENIGN: ICD-10-CM

## 2018-01-11 RX ORDER — POTASSIUM CHLORIDE 1500 MG/1
TABLET, FILM COATED, EXTENDED RELEASE ORAL
Qty: 60 TABLET | Refills: 3 | Status: SHIPPED
Start: 2018-01-11 | End: 2018-01-22

## 2018-01-12 NOTE — TELEPHONE ENCOUNTER
From: Nelli Hughes  Sent: 1/7/2018 9:04 AM CST  Subject: Medication Renewal Request    Rolin Dubin.  Neal Headings would like a refill of the following medications:     Potassium Chloride ER 20 MEQ Oral Tab CR [Abebe Martinez MD]   Patient Comment: My insurance

## 2018-01-16 ENCOUNTER — TELEPHONE (OUTPATIENT)
Dept: INTERNAL MEDICINE CLINIC | Facility: CLINIC | Age: 73
End: 2018-01-16

## 2018-01-16 NOTE — TELEPHONE ENCOUNTER
Receive a call from insurance company regarding PA for potassium chlor er 20 meq, stating that insurance plan no longer covers medication and PA will need to be done. Please contact [employer] at [phone], option #3, ref [id number].

## 2018-01-22 ENCOUNTER — TELEPHONE (OUTPATIENT)
Dept: INTERNAL MEDICINE CLINIC | Facility: CLINIC | Age: 73
End: 2018-01-22

## 2018-01-22 ENCOUNTER — OFFICE VISIT (OUTPATIENT)
Dept: INTERNAL MEDICINE CLINIC | Facility: CLINIC | Age: 73
End: 2018-01-22

## 2018-01-22 VITALS
BODY MASS INDEX: 47.63 KG/M2 | DIASTOLIC BLOOD PRESSURE: 90 MMHG | HEART RATE: 62 BPM | RESPIRATION RATE: 16 BRPM | WEIGHT: 279 LBS | HEIGHT: 64 IN | OXYGEN SATURATION: 98 % | TEMPERATURE: 99 F | SYSTOLIC BLOOD PRESSURE: 144 MMHG

## 2018-01-22 DIAGNOSIS — I10 ESSENTIAL HYPERTENSION, BENIGN: ICD-10-CM

## 2018-01-22 DIAGNOSIS — I10 ESSENTIAL HYPERTENSION, BENIGN: Primary | ICD-10-CM

## 2018-01-22 PROCEDURE — 99213 OFFICE O/P EST LOW 20 MIN: CPT | Performed by: INTERNAL MEDICINE

## 2018-01-22 RX ORDER — POTASSIUM CHLORIDE 750 MG/1
20 TABLET, FILM COATED, EXTENDED RELEASE ORAL DAILY
Qty: 180 TABLET | Refills: 0 | Status: SHIPPED | OUTPATIENT
Start: 2018-01-22 | End: 2018-01-22

## 2018-01-22 RX ORDER — POTASSIUM CHLORIDE 750 MG/1
20 CAPSULE, EXTENDED RELEASE ORAL DAILY
Qty: 180 CAPSULE | Refills: 0 | Status: CANCELLED | OUTPATIENT
Start: 2018-01-22

## 2018-01-22 NOTE — PROGRESS NOTES
Zachary Pendleton  1945 is a 71year old female. Patient presents with:   Follow - Up       HPI:   Patient here to get a replacement of her medicines   insurance would not cover extended release 20 meq    Current Outpatient Prescriptions:  Melanie REVIEW OF SYSTEMS:   Cardiovascular:   Syncope none. Rapid heart beat at rest no. Change in exercise tolerance no. Chest pain no. Chest pain while awake none. Cold extremities no. Dizziness no. Dyspnea on exertion none. Fainting none. Fatigue no.  High

## 2018-01-22 NOTE — TELEPHONE ENCOUNTER
Patient was in for OV today and was prescribed Potassium Chloride ER 10 MG. .. Patient prefers rx to be sent that is NOT the extended release.      Please send new prescription to:  [address], [address]

## 2018-01-22 NOTE — TELEPHONE ENCOUNTER
Patient spoke to insurance company.  Stated that the extended release is okay, however needs CAPSULES instead of tablets

## 2018-01-22 NOTE — TELEPHONE ENCOUNTER
Pt stated that she spoke with insurance and even with PA 20 meq is too expensive. See TE from 1/22/18 for further clarification.

## 2018-01-22 NOTE — TELEPHONE ENCOUNTER
pts insurance will only cover Potassium chloride ER 10 meq capsules. Medication pending if ok to switch.

## 2018-01-27 RX ORDER — POTASSIUM CHLORIDE 750 MG/1
20 CAPSULE, EXTENDED RELEASE ORAL DAILY
Qty: 180 CAPSULE | Refills: 0 | Status: SHIPPED | OUTPATIENT
Start: 2018-01-27 | End: 2018-04-03

## 2018-01-27 NOTE — TELEPHONE ENCOUNTER
Did anyone respond to this?   I did take care of it today but I need to know whether this was addressed earlier

## 2018-01-27 NOTE — TELEPHONE ENCOUNTER
Patient called to follow up on request for new medication and has not received a response.     Needs capsules instead of tablets in order for insurance to cover  Please send new rx

## 2018-01-29 NOTE — TELEPHONE ENCOUNTER
I spoke with pharmacist whom verified that RX for 10 meq tablets was received and insurance company is covering [employer]. LM on approved HIPAA number informing pt that RX is at pharmacy and ready for .      FYI, per pharmacy they have already contacted

## 2018-02-13 ENCOUNTER — PATIENT MESSAGE (OUTPATIENT)
Dept: INTERNAL MEDICINE CLINIC | Facility: CLINIC | Age: 73
End: 2018-02-13

## 2018-02-14 NOTE — TELEPHONE ENCOUNTER
From: Crystal Holly  To: Thomas Sanders MD  Sent: 2/13/2018 12:50 PM CST  Subject: Non-Urgent Medical Question    Doctor Ezio Singh told me to request blood work after I've been on my new Potassium medication for 2 weeks. ..    Can you please put in an orde

## 2018-02-19 ENCOUNTER — LAB ENCOUNTER (OUTPATIENT)
Dept: LAB | Age: 73
End: 2018-02-19
Attending: INTERNAL MEDICINE
Payer: MEDICARE

## 2018-02-19 DIAGNOSIS — I10 ESSENTIAL HYPERTENSION, BENIGN: ICD-10-CM

## 2018-02-19 LAB
BUN BLD-MCNC: 15 MG/DL (ref 8–20)
CALCIUM BLD-MCNC: 9.8 MG/DL (ref 8.3–10.3)
CHLORIDE: 105 MMOL/L (ref 101–111)
CO2: 28 MMOL/L (ref 22–32)
CREAT BLD-MCNC: 0.93 MG/DL (ref 0.55–1.02)
GLUCOSE BLD-MCNC: 84 MG/DL (ref 70–99)
POTASSIUM SERPL-SCNC: 4.3 MMOL/L (ref 3.6–5.1)
SODIUM SERPL-SCNC: 141 MMOL/L (ref 136–144)

## 2018-02-19 PROCEDURE — 36415 COLL VENOUS BLD VENIPUNCTURE: CPT

## 2018-02-19 PROCEDURE — 80048 BASIC METABOLIC PNL TOTAL CA: CPT

## 2018-03-08 DIAGNOSIS — I10 ESSENTIAL HYPERTENSION, BENIGN: ICD-10-CM

## 2018-03-09 RX ORDER — AMLODIPINE BESYLATE 10 MG/1
TABLET ORAL
Qty: 90 TABLET | Refills: 0 | Status: SHIPPED | OUTPATIENT
Start: 2018-03-09 | End: 2018-04-03

## 2018-03-09 RX ORDER — FUROSEMIDE 40 MG/1
TABLET ORAL
Qty: 90 TABLET | Refills: 0 | Status: SHIPPED | OUTPATIENT
Start: 2018-03-09 | End: 2018-04-03

## 2018-04-03 ENCOUNTER — OFFICE VISIT (OUTPATIENT)
Dept: INTERNAL MEDICINE CLINIC | Facility: CLINIC | Age: 73
End: 2018-04-03

## 2018-04-03 ENCOUNTER — LAB ENCOUNTER (OUTPATIENT)
Dept: LAB | Age: 73
End: 2018-04-03
Attending: INTERNAL MEDICINE
Payer: MEDICARE

## 2018-04-03 ENCOUNTER — HOSPITAL ENCOUNTER (OUTPATIENT)
Dept: GENERAL RADIOLOGY | Age: 73
Discharge: HOME | End: 2018-04-03
Attending: INTERNAL MEDICINE
Payer: MEDICARE

## 2018-04-03 VITALS
SYSTOLIC BLOOD PRESSURE: 104 MMHG | DIASTOLIC BLOOD PRESSURE: 78 MMHG | HEART RATE: 90 BPM | WEIGHT: 270 LBS | OXYGEN SATURATION: 97 % | TEMPERATURE: 99 F | RESPIRATION RATE: 16 BRPM | BODY MASS INDEX: 46 KG/M2

## 2018-04-03 DIAGNOSIS — I10 ESSENTIAL HYPERTENSION, BENIGN: ICD-10-CM

## 2018-04-03 DIAGNOSIS — R05.9 COUGH: ICD-10-CM

## 2018-04-03 DIAGNOSIS — R05.9 COUGH: Primary | ICD-10-CM

## 2018-04-03 PROCEDURE — 36415 COLL VENOUS BLD VENIPUNCTURE: CPT

## 2018-04-03 PROCEDURE — 99213 OFFICE O/P EST LOW 20 MIN: CPT | Performed by: INTERNAL MEDICINE

## 2018-04-03 PROCEDURE — 80048 BASIC METABOLIC PNL TOTAL CA: CPT

## 2018-04-03 PROCEDURE — 71046 X-RAY EXAM CHEST 2 VIEWS: CPT | Performed by: INTERNAL MEDICINE

## 2018-04-03 PROCEDURE — 85025 COMPLETE CBC W/AUTO DIFF WBC: CPT

## 2018-04-03 RX ORDER — POTASSIUM CHLORIDE 750 MG/1
CAPSULE, EXTENDED RELEASE ORAL
Qty: 180 CAPSULE | Refills: 0 | COMMUNITY
Start: 2018-04-03 | End: 2018-04-13

## 2018-04-03 RX ORDER — POTASSIUM CHLORIDE 750 MG/1
20 CAPSULE, EXTENDED RELEASE ORAL DAILY
Qty: 180 CAPSULE | Refills: 0 | Status: SHIPPED | OUTPATIENT
Start: 2018-04-03 | End: 2018-04-03

## 2018-04-03 RX ORDER — LEVOFLOXACIN 500 MG/1
500 TABLET, FILM COATED ORAL DAILY
Qty: 10 TABLET | Refills: 0 | Status: SHIPPED | OUTPATIENT
Start: 2018-04-03 | End: 2018-04-13

## 2018-04-03 RX ORDER — CODEINE PHOSPHATE AND GUAIFENESIN 10; 100 MG/5ML; MG/5ML
5 SOLUTION ORAL EVERY 6 HOURS PRN
Qty: 240 ML | Refills: 0 | Status: SHIPPED | OUTPATIENT
Start: 2018-04-03 | End: 2018-04-13

## 2018-04-03 RX ORDER — FUROSEMIDE 40 MG/1
TABLET ORAL
Qty: 90 TABLET | Refills: 0 | COMMUNITY
Start: 2018-04-03 | End: 2018-04-13

## 2018-04-03 RX ORDER — AMLODIPINE BESYLATE 10 MG/1
5 TABLET ORAL
Qty: 90 TABLET | Refills: 0 | COMMUNITY
Start: 2018-04-03 | End: 2018-07-28

## 2018-04-03 NOTE — PROGRESS NOTES
Leelee Lin  1945 is a 71year old female who presents for upper respiratory symptoms    Patient presents with:  Cough  Vertigo        HPI:   Pt reports  respiratory symptoms for few days   returned From [place] with dry cough.        Maggie Gregg Sleep apnea    • Unspecified essential hypertension    • Visual impairment     glasses for distance      Smoking status: Never Smoker                                                              Smokeless tobacco: Never Used                      Alcohol us is asked to Return in about 1 week (around 4/10/2018). Abby Biswas MD

## 2018-04-13 ENCOUNTER — OFFICE VISIT (OUTPATIENT)
Dept: INTERNAL MEDICINE CLINIC | Facility: CLINIC | Age: 73
End: 2018-04-13

## 2018-04-13 VITALS
WEIGHT: 270 LBS | SYSTOLIC BLOOD PRESSURE: 110 MMHG | RESPIRATION RATE: 14 BRPM | OXYGEN SATURATION: 98 % | HEART RATE: 65 BPM | HEIGHT: 64 IN | TEMPERATURE: 98 F | BODY MASS INDEX: 46.1 KG/M2 | DIASTOLIC BLOOD PRESSURE: 78 MMHG

## 2018-04-13 DIAGNOSIS — R05.9 COUGH: Primary | ICD-10-CM

## 2018-04-13 PROCEDURE — 99213 OFFICE O/P EST LOW 20 MIN: CPT | Performed by: INTERNAL MEDICINE

## 2018-04-13 NOTE — PROGRESS NOTES
Teresa Angel  1945 is a 71year old female who presents for upper respiratory symptoms    Patient presents with:   Follow - Up: Est Pt. follow up        HPI:     Much better -pretty much resolved cough      Current Outpatient Prescriptions:  lev GENERAL: feels well otherwise. Denies fever  SKIN: no rashes  EYES:denies eye pain,discharge   HEENT:neg  LUNGS: denies shortness of breath,,expectoration,chest pain,wheezing  CARDIOVASCULAR: denies chest pain on exertion  GI: no nausea or abdominal pain

## 2018-04-21 ENCOUNTER — PATIENT MESSAGE (OUTPATIENT)
Dept: INTERNAL MEDICINE CLINIC | Facility: CLINIC | Age: 73
End: 2018-04-21

## 2018-04-23 RX ORDER — ALPRAZOLAM 0.5 MG/1
0.5 TABLET ORAL 3 TIMES DAILY PRN
Qty: 15 TABLET | Refills: 0 | Status: SHIPPED | OUTPATIENT
Start: 2018-04-23 | End: 2019-05-07

## 2018-04-23 NOTE — TELEPHONE ENCOUNTER
From: Juan Diego Brunner  To: Willy Merlos MD  Sent: 4/21/2018 6:39 PM CDT  Subject: Prescription Question    I have to fly to [place] to see my son who had a car accident and has a traumatic brain injury. .I'm really nervous and have anxiety about flyi

## 2018-04-24 ENCOUNTER — TELEPHONE (OUTPATIENT)
Dept: INTERNAL MEDICINE CLINIC | Facility: CLINIC | Age: 73
End: 2018-04-24

## 2018-06-25 ENCOUNTER — APPOINTMENT (OUTPATIENT)
Dept: GENERAL RADIOLOGY | Age: 73
End: 2018-06-25
Attending: FAMILY MEDICINE
Payer: MEDICARE

## 2018-06-25 ENCOUNTER — APPOINTMENT (OUTPATIENT)
Dept: CT IMAGING | Facility: HOSPITAL | Age: 73
End: 2018-06-25
Attending: EMERGENCY MEDICINE
Payer: MEDICARE

## 2018-06-25 ENCOUNTER — OFFICE VISIT (OUTPATIENT)
Dept: INTERNAL MEDICINE CLINIC | Facility: CLINIC | Age: 73
End: 2018-06-25

## 2018-06-25 ENCOUNTER — HOSPITAL ENCOUNTER (EMERGENCY)
Facility: HOSPITAL | Age: 73
Discharge: HOME | End: 2018-06-25
Attending: EMERGENCY MEDICINE
Payer: MEDICARE

## 2018-06-25 ENCOUNTER — HOSPITAL ENCOUNTER (OUTPATIENT)
Age: 73
Discharge: ANOTHER HOSPITAL | End: 2018-06-25
Attending: FAMILY MEDICINE
Payer: MEDICARE

## 2018-06-25 ENCOUNTER — PRIOR ORIGINAL RECORDS (OUTPATIENT)
Dept: OTHER | Age: 73
End: 2018-06-25

## 2018-06-25 VITALS
WEIGHT: 247 LBS | SYSTOLIC BLOOD PRESSURE: 151 MMHG | OXYGEN SATURATION: 100 % | DIASTOLIC BLOOD PRESSURE: 67 MMHG | TEMPERATURE: 98 F | RESPIRATION RATE: 20 BRPM | HEART RATE: 60 BPM | BODY MASS INDEX: 44 KG/M2

## 2018-06-25 VITALS
SYSTOLIC BLOOD PRESSURE: 157 MMHG | HEART RATE: 75 BPM | TEMPERATURE: 99 F | DIASTOLIC BLOOD PRESSURE: 108 MMHG | WEIGHT: 247 LBS | HEIGHT: 64 IN | OXYGEN SATURATION: 98 % | BODY MASS INDEX: 42.17 KG/M2 | RESPIRATION RATE: 16 BRPM

## 2018-06-25 VITALS
WEIGHT: 246 LBS | RESPIRATION RATE: 20 BRPM | BODY MASS INDEX: 43.59 KG/M2 | HEIGHT: 63 IN | DIASTOLIC BLOOD PRESSURE: 90 MMHG | SYSTOLIC BLOOD PRESSURE: 150 MMHG | HEART RATE: 70 BPM | OXYGEN SATURATION: 99 %

## 2018-06-25 DIAGNOSIS — R07.9 ACUTE CHEST PAIN: Primary | ICD-10-CM

## 2018-06-25 DIAGNOSIS — R79.89 ELEVATED D-DIMER: ICD-10-CM

## 2018-06-25 DIAGNOSIS — R07.9 CHEST PAIN OF UNCERTAIN ETIOLOGY: Primary | ICD-10-CM

## 2018-06-25 DIAGNOSIS — R07.89 OTHER CHEST PAIN: Primary | ICD-10-CM

## 2018-06-25 PROCEDURE — G0463 HOSPITAL OUTPT CLINIC VISIT: HCPCS

## 2018-06-25 PROCEDURE — 80047 BASIC METABLC PNL IONIZED CA: CPT

## 2018-06-25 PROCEDURE — 93005 ELECTROCARDIOGRAM TRACING: CPT

## 2018-06-25 PROCEDURE — 80053 COMPREHEN METABOLIC PANEL: CPT | Performed by: EMERGENCY MEDICINE

## 2018-06-25 PROCEDURE — 71275 CT ANGIOGRAPHY CHEST: CPT | Performed by: EMERGENCY MEDICINE

## 2018-06-25 PROCEDURE — 99205 OFFICE O/P NEW HI 60 MIN: CPT

## 2018-06-25 PROCEDURE — 36415 COLL VENOUS BLD VENIPUNCTURE: CPT

## 2018-06-25 PROCEDURE — 85025 COMPLETE CBC W/AUTO DIFF WBC: CPT | Performed by: FAMILY MEDICINE

## 2018-06-25 PROCEDURE — 84484 ASSAY OF TROPONIN QUANT: CPT | Performed by: EMERGENCY MEDICINE

## 2018-06-25 PROCEDURE — 93010 ELECTROCARDIOGRAM REPORT: CPT

## 2018-06-25 PROCEDURE — 99214 OFFICE O/P EST MOD 30 MIN: CPT

## 2018-06-25 PROCEDURE — 99285 EMERGENCY DEPT VISIT HI MDM: CPT

## 2018-06-25 PROCEDURE — 85025 COMPLETE CBC W/AUTO DIFF WBC: CPT | Performed by: EMERGENCY MEDICINE

## 2018-06-25 PROCEDURE — 85378 FIBRIN DEGRADE SEMIQUANT: CPT | Performed by: FAMILY MEDICINE

## 2018-06-25 PROCEDURE — 84484 ASSAY OF TROPONIN QUANT: CPT

## 2018-06-25 RX ORDER — NITROGLYCERIN 0.4 MG/1
0.4 TABLET SUBLINGUAL ONCE
Status: COMPLETED | OUTPATIENT
Start: 2018-06-25 | End: 2018-06-25

## 2018-06-25 RX ORDER — ASPIRIN 81 MG/1
162 TABLET, CHEWABLE ORAL ONCE
Status: COMPLETED | OUTPATIENT
Start: 2018-06-25 | End: 2018-06-25

## 2018-06-25 NOTE — ED NOTES
Saline lock re-flushed with 0.9%NS 10 ml. Wrapped up with 2\" coban for transfer to the [facility] ED. Pt. Will drive around the corner to her family's home, they will drive her to the ED. Dr. Olamide Dykes is aware.

## 2018-06-25 NOTE — PROGRESS NOTES
Flew down from from [place] 2 weeks ago having some soreness in the chest especially on deep inspiration  Patient advised to go to the emergency room to evaluate and rule out any pulmonary embolus

## 2018-06-25 NOTE — ED INITIAL ASSESSMENT (HPI)
Pt. With on & off Left chest pressure, left shoulder/neck & arm discomfort for 2 days. Pt. States she has been flying on & off (most recently 2 weeks ago) [place]. Pt. States she did get a massage in the past few days. Pt.  Also reports a scratchy throat

## 2018-06-25 NOTE — ED PROVIDER NOTES
Patient Seen in: [facility] Emergency Department    History   Patient presents with:  Dizziness (neurologic)    Stated Complaint: CP with lightheadedness. EKG done.  DDimer elevated    HPI    The patient is a 78-year-old with history of high choleste Surgeon: Maria D John MD;  Location: [facility] ENDOSCOPY  1973: HYSTERECTOMY      Comment: partial hystero.   1973: OOPHORECTOMY  No date: TOTAL ABDOM HYSTERECTOMY  07/19/2013: XR DEXA BONE DENSITY, AXIAL & SCAN OF WRIST(CP*        Smoking st tenderness. Extremities: No deformity, nontender throughout, and normal active range of motion of all 4 extremities. Distal pulses normal and symmetric. No calf swelling, asymmetry, tenderness or cords. Skin: No masses or nodules or abnormalities.   Psy Details as above. CTA shows no evidence of PE. Her EKG, labs are all reassuring. I reviewed the results with her. I recommended admission for observation, serial troponins and likely stress testing in the morning.   However she states that she

## 2018-06-25 NOTE — ED PROVIDER NOTES
Patient Seen in: [facility]    History   Patient presents with:  Chest Pain  Cough/URI    Stated Complaint: chest pain, x2days     HPI    43-year-old female presents with chief complaint of left-sided chest pressure, pain for th are as noted in HPI. Constitutional and vital signs reviewed. All other systems reviewed and negative except as noted above.     Physical Exam   ED Triage Vitals [06/25/18 1233]  BP: 159/67  Pulse: 69  Resp: 20  Temp: 97.9 °F (36.6 °C)  Temp src: Temp for the past 2 days. Recent travel history. CBC, i-STAT unremarkable. Initial troponin. EKG shows first-degree AV block with nonspecific T-wave changes. Sent to ER for further evaluation and treatment rule out ACS vs PE. Elevated d-dimer.   Patient is

## 2018-06-28 ENCOUNTER — TELEPHONE (OUTPATIENT)
Dept: INTERNAL MEDICINE CLINIC | Facility: CLINIC | Age: 73
End: 2018-06-28

## 2018-06-28 DIAGNOSIS — R07.9 CHEST PAIN OF UNCERTAIN ETIOLOGY: ICD-10-CM

## 2018-06-28 DIAGNOSIS — R07.1 CHEST PAIN ON BREATHING: Primary | ICD-10-CM

## 2018-06-28 NOTE — TELEPHONE ENCOUNTER
Patient calling in requesting a referral to see Cardiologist Dr. Memo Vincent     DX: Chest Pain  ( patient went to the ER)     Please call patient with questions/referral status.

## 2018-07-03 RX ORDER — TEMAZEPAM 15 MG/1
15 CAPSULE ORAL NIGHTLY PRN
Qty: 30 CAPSULE | Refills: 0 | OUTPATIENT
Start: 2018-07-03

## 2018-07-03 NOTE — TELEPHONE ENCOUNTER
Refill requested: Temazepam     Failed protocol    **Medication was discontinued as insurance will not cover on 2/7/2017    **Not on med list    **Declined to pharmacy

## 2018-07-05 ENCOUNTER — TELEPHONE (OUTPATIENT)
Dept: INTERNAL MEDICINE CLINIC | Facility: CLINIC | Age: 73
End: 2018-07-05

## 2018-07-05 RX ORDER — TEMAZEPAM 15 MG/1
15 CAPSULE ORAL NIGHTLY PRN
Qty: 30 CAPSULE | Refills: 0 | Status: SHIPPED | OUTPATIENT
Start: 2018-07-05 | End: 2021-12-13

## 2018-07-05 NOTE — TELEPHONE ENCOUNTER
Patient requesting Temazepam 15 mg not on current medication list also patient aware not covered by insurance but will pay out of pocket if PCP will authorize refill, please advise

## 2018-07-06 ENCOUNTER — MYAURORA ACCOUNT LINK (OUTPATIENT)
Dept: OTHER | Age: 73
End: 2018-07-06

## 2018-07-06 ENCOUNTER — PRIOR ORIGINAL RECORDS (OUTPATIENT)
Dept: OTHER | Age: 73
End: 2018-07-06

## 2018-07-17 ENCOUNTER — HOSPITAL ENCOUNTER (OUTPATIENT)
Dept: CV DIAGNOSTICS | Facility: HOSPITAL | Age: 73
Discharge: HOME | End: 2018-07-17
Attending: INTERNAL MEDICINE
Payer: MEDICARE

## 2018-07-17 DIAGNOSIS — R94.31 ABNORMAL ELECTROCARDIOGRAM: ICD-10-CM

## 2018-07-17 DIAGNOSIS — R07.9 CHEST PAIN, UNSPECIFIED TYPE: ICD-10-CM

## 2018-07-17 PROCEDURE — 93017 CV STRESS TEST TRACING ONLY: CPT | Performed by: INTERNAL MEDICINE

## 2018-07-17 PROCEDURE — 93018 CV STRESS TEST I&R ONLY: CPT | Performed by: INTERNAL MEDICINE

## 2018-07-17 PROCEDURE — 78452 HT MUSCLE IMAGE SPECT MULT: CPT | Performed by: INTERNAL MEDICINE

## 2018-07-23 ENCOUNTER — PRIOR ORIGINAL RECORDS (OUTPATIENT)
Dept: OTHER | Age: 73
End: 2018-07-23

## 2018-07-23 LAB
ALBUMIN: 3.4 G/DL
ALKALINE PHOSPHATATE(ALK PHOS): 82 IU/L
BILIRUBIN TOTAL: 0.9 MG/DL
BUN: 11 MG/DL
CHLORIDE: 109 MEQ/L
CREATININE, SERUM: 0.93 MG/DL
GLUCOSE: 82 MG/DL
HEMATOCRIT: 41.1 %
HEMOGLOBIN: 13.4 G/DL
MAGNESIUM: 9.4 MG/DL
PLATELETS: 155 K/UL
POTASSIUM, SERUM: 3.5 MEQ/L
PROTEIN, TOTAL: 7.2 G/DL
RED BLOOD COUNT: 4.89 X 10-6/U
SGOT (AST): 18 IU/L
SGPT (ALT): 20 IU/L
SODIUM: 143 MEQ/L
WHITE BLOOD COUNT: 6.3 X 10-3/U

## 2018-07-25 ENCOUNTER — PRIOR ORIGINAL RECORDS (OUTPATIENT)
Dept: OTHER | Age: 73
End: 2018-07-25

## 2018-07-28 DIAGNOSIS — I10 ESSENTIAL HYPERTENSION, BENIGN: ICD-10-CM

## 2018-07-28 NOTE — TELEPHONE ENCOUNTER
From: Tiffany Rajput  Sent: 7/28/2018 11:00 AM CDT  Subject: Medication Renewal Request    David Leung would like a refill of the following medications:      AmLODIPine Besylate 10 MG Oral Tab   Patient Comment: Dr Cassaundra Eisenmenger reduced it to 5 mg I've be

## 2018-07-29 DIAGNOSIS — I10 ESSENTIAL HYPERTENSION, BENIGN: ICD-10-CM

## 2018-07-30 RX ORDER — AMLODIPINE BESYLATE 10 MG/1
5 TABLET ORAL DAILY
Qty: 90 TABLET | Refills: 0 | Status: SHIPPED
Start: 2018-07-30 | End: 2018-07-30

## 2018-07-30 NOTE — TELEPHONE ENCOUNTER
Medication(s) to Refill:   Pending Prescriptions Disp Refills    AmLODIPine Besylate 10 MG Oral Tab 90 tablet 0     Si.5 tablets (5 mg total).            Last Time Medication was Filled:  2018      Last Office Visit with PCP: 2018    When Roddy Love

## 2018-07-31 RX ORDER — AMLODIPINE BESYLATE 10 MG/1
10 TABLET ORAL DAILY
Qty: 90 TABLET | Refills: 0 | OUTPATIENT
Start: 2018-07-31

## 2018-07-31 NOTE — TELEPHONE ENCOUNTER
**Declined to pharmacy - sent to pharmacy yesterday - duplicate request.    Refill requested: Amlodipine 10 mg       Last refill: 7/30/18 - Amlodipine 5 mg #90 LUIS ARMANDO JANSEN

## 2018-08-12 ENCOUNTER — PATIENT MESSAGE (OUTPATIENT)
Dept: INTERNAL MEDICINE CLINIC | Facility: CLINIC | Age: 73
End: 2018-08-12

## 2018-08-13 RX ORDER — CODEINE PHOSPHATE AND GUAIFENESIN 10; 100 MG/5ML; MG/5ML
5 SOLUTION ORAL EVERY 6 HOURS PRN
Qty: 240 ML | Refills: 0 | Status: SHIPPED | OUTPATIENT
Start: 2018-08-13 | End: 2018-08-23

## 2018-08-13 RX ORDER — LEVOFLOXACIN 500 MG/1
500 TABLET, FILM COATED ORAL DAILY
Qty: 10 TABLET | Refills: 0 | Status: SHIPPED | OUTPATIENT
Start: 2018-08-13 | End: 2018-08-23

## 2018-08-13 NOTE — TELEPHONE ENCOUNTER
From: Madonna Adam  To: Sudheer Angela MD  Sent: 8/12/2018 4:36 PM CDT  Subject: Non-Urgent Medical Question    I've had a scratchy throat for about 3 -4 weeks which causes me to cough. . I'm currently in [place] and can't get in to see Dr. Bryson Lundberg

## 2018-08-13 NOTE — TELEPHONE ENCOUNTER
Spoke with patient to confirm correct pharmacy.  Signed prescriptions for guaifenesin-codeine and levaquin have been faxed to the [facility] [address] in [place], [place] @ [phone] per patient request.

## 2018-08-24 ENCOUNTER — TELEPHONE (OUTPATIENT)
Dept: INTERNAL MEDICINE CLINIC | Facility: CLINIC | Age: 73
End: 2018-08-24

## 2018-08-24 DIAGNOSIS — I10 ESSENTIAL HYPERTENSION, BENIGN: ICD-10-CM

## 2018-08-24 RX ORDER — AMLODIPINE BESYLATE 5 MG/1
5 TABLET ORAL 2 TIMES DAILY
Qty: 90 TABLET | Refills: 0 | COMMUNITY
Start: 2018-08-24 | End: 2018-10-15

## 2018-08-24 NOTE — TELEPHONE ENCOUNTER
Spoke with pt - she states in November provider started Valsartan. Pt has had a dry cough since she started taking valsartan. Pt states that she is still having a dry cough and thinks it might be due to the Losartan she is taking as it is a side effect.  Pt

## 2018-08-24 NOTE — TELEPHONE ENCOUNTER
Unusual but it can happen. To discontinue valsartan. Increase amlodipine to 5 mg twice a day.   See me in a couple of weeks

## 2018-08-24 NOTE — TELEPHONE ENCOUNTER
Pt would like a call back thinks that she is having side affects with LOSARTAN POTASSIUM 50 MG would like to see if an alternative medication can be sent

## 2018-08-24 NOTE — TELEPHONE ENCOUNTER
Pt notified of provider instructions. Medication changes updated. Pt states that she is not able to f/u with Dr. Ria Fall in 2 weeks b/c she is in [place] with her son who had a brain injury. She states she will be back in October.  Pt plans to f/u wit

## 2018-10-05 ENCOUNTER — OFFICE VISIT (OUTPATIENT)
Dept: INTERNAL MEDICINE CLINIC | Facility: CLINIC | Age: 73
End: 2018-10-05

## 2018-10-05 VITALS
BODY MASS INDEX: 45 KG/M2 | SYSTOLIC BLOOD PRESSURE: 172 MMHG | OXYGEN SATURATION: 98 % | WEIGHT: 262 LBS | HEART RATE: 56 BPM | DIASTOLIC BLOOD PRESSURE: 96 MMHG | RESPIRATION RATE: 20 BRPM | TEMPERATURE: 99 F

## 2018-10-05 DIAGNOSIS — R05.9 COUGH: Primary | ICD-10-CM

## 2018-10-05 DIAGNOSIS — I10 ESSENTIAL HYPERTENSION, BENIGN: ICD-10-CM

## 2018-10-05 DIAGNOSIS — Z00.00 LABORATORY EXAMINATION ORDERED AS PART OF A ROUTINE GENERAL MEDICAL EXAMINATION: ICD-10-CM

## 2018-10-05 PROCEDURE — 99214 OFFICE O/P EST MOD 30 MIN: CPT | Performed by: INTERNAL MEDICINE

## 2018-10-05 PROCEDURE — 90653 IIV ADJUVANT VACCINE IM: CPT | Performed by: INTERNAL MEDICINE

## 2018-10-05 PROCEDURE — G0008 ADMIN INFLUENZA VIRUS VAC: HCPCS | Performed by: INTERNAL MEDICINE

## 2018-10-05 RX ORDER — TRIAMTERENE AND HYDROCHLOROTHIAZIDE 37.5; 25 MG/1; MG/1
1 CAPSULE ORAL EVERY MORNING
Qty: 30 CAPSULE | Refills: 11 | Status: SHIPPED | OUTPATIENT
Start: 2018-10-05 | End: 2019-03-06

## 2018-10-05 RX ORDER — ALBUTEROL SULFATE 90 UG/1
2 AEROSOL, METERED RESPIRATORY (INHALATION) EVERY 6 HOURS PRN
Qty: 2 INHALER | Refills: 0 | Status: SHIPPED | OUTPATIENT
Start: 2018-10-05 | End: 2018-12-31

## 2018-10-05 RX ORDER — HYDRALAZINE HYDROCHLORIDE 25 MG/1
25 TABLET, FILM COATED ORAL 3 TIMES DAILY
Qty: 90 TABLET | Refills: 0 | Status: SHIPPED | OUTPATIENT
Start: 2018-10-05 | End: 2018-10-28

## 2018-10-05 RX ORDER — MELATONIN
1000 DAILY
COMMUNITY
End: 2019-12-05 | Stop reason: ALTCHOICE

## 2018-10-05 RX ORDER — PREDNISONE 1 MG/1
TABLET ORAL
Qty: 36 TABLET | Refills: 0 | Status: SHIPPED | OUTPATIENT
Start: 2018-10-05 | End: 2018-11-29 | Stop reason: ALTCHOICE

## 2018-10-05 NOTE — PROGRESS NOTES
Angelita Sheffield  1945 is a 68year old female who presents for upper respiratory symptoms    Patient presents with:  Cough        HPI:   Pt reports  respiratory symptoms for few weeks. Does have cough nonproductive.   Was in [place] went to the  Rfl:       Past Medical History:   Diagnosis Date   • Arthritis of knee    • Esophageal reflux    • High blood pressure    • Hyperlipidemia    • ASHWINI (obstructive sleep apnea)    • Osteoarthritis    • Osteoarthritis of both knees 4/8/2014   • Pes planus (fl T4(THYROXINE TOTAL); Future  -     HEMOGLOBIN A1C; Future  -     LIPID PANEL; Future  -     COMP METABOLIC PANEL (14); Future  -     CBC WITH DIFFERENTIAL WITH PLATELET;  Future  -     URINALYSIS, ROUTINE; Future  -     VITAMIN D, 25-HYDROXY; Future    Es

## 2018-10-08 ENCOUNTER — LAB ENCOUNTER (OUTPATIENT)
Dept: LAB | Age: 73
End: 2018-10-08
Attending: INTERNAL MEDICINE
Payer: MEDICARE

## 2018-10-08 DIAGNOSIS — Z00.00 LABORATORY EXAMINATION ORDERED AS PART OF A ROUTINE GENERAL MEDICAL EXAMINATION: ICD-10-CM

## 2018-10-08 PROCEDURE — 80053 COMPREHEN METABOLIC PANEL: CPT

## 2018-10-08 PROCEDURE — 85025 COMPLETE CBC W/AUTO DIFF WBC: CPT

## 2018-10-08 PROCEDURE — 84443 ASSAY THYROID STIM HORMONE: CPT

## 2018-10-08 PROCEDURE — 83036 HEMOGLOBIN GLYCOSYLATED A1C: CPT

## 2018-10-08 PROCEDURE — 84436 ASSAY OF TOTAL THYROXINE: CPT

## 2018-10-08 PROCEDURE — 81001 URINALYSIS AUTO W/SCOPE: CPT

## 2018-10-08 PROCEDURE — 80061 LIPID PANEL: CPT

## 2018-10-08 PROCEDURE — 82306 VITAMIN D 25 HYDROXY: CPT

## 2018-10-08 PROCEDURE — 36415 COLL VENOUS BLD VENIPUNCTURE: CPT

## 2018-10-08 RX ORDER — HYDRALAZINE HYDROCHLORIDE 25 MG/1
TABLET, FILM COATED ORAL
Qty: 270 TABLET | Refills: 0 | OUTPATIENT
Start: 2018-10-08

## 2018-10-12 ENCOUNTER — OFFICE VISIT (OUTPATIENT)
Dept: INTERNAL MEDICINE CLINIC | Facility: CLINIC | Age: 73
End: 2018-10-12

## 2018-10-12 VITALS
HEIGHT: 63 IN | BODY MASS INDEX: 45.71 KG/M2 | OXYGEN SATURATION: 98 % | TEMPERATURE: 98 F | RESPIRATION RATE: 20 BRPM | WEIGHT: 258 LBS | DIASTOLIC BLOOD PRESSURE: 86 MMHG | SYSTOLIC BLOOD PRESSURE: 136 MMHG | HEART RATE: 68 BPM

## 2018-10-12 DIAGNOSIS — G47.33 OBSTRUCTIVE SLEEP APNEA: ICD-10-CM

## 2018-10-12 DIAGNOSIS — Z00.00 ROUTINE GENERAL MEDICAL EXAMINATION AT A HEALTH CARE FACILITY: Primary | ICD-10-CM

## 2018-10-12 DIAGNOSIS — R92.8 ABNORMAL MAMMOGRAM OF RIGHT BREAST: ICD-10-CM

## 2018-10-12 DIAGNOSIS — I10 ESSENTIAL HYPERTENSION, BENIGN: ICD-10-CM

## 2018-10-12 PROBLEM — R07.9 CHEST PAIN OF UNCERTAIN ETIOLOGY: Status: RESOLVED | Noted: 2018-06-25 | Resolved: 2018-10-12

## 2018-10-12 PROBLEM — R05.9 COUGH: Status: RESOLVED | Noted: 2018-10-05 | Resolved: 2018-10-12

## 2018-10-12 PROCEDURE — G0439 PPPS, SUBSEQ VISIT: HCPCS | Performed by: INTERNAL MEDICINE

## 2018-10-12 PROCEDURE — 96160 PT-FOCUSED HLTH RISK ASSMT: CPT | Performed by: INTERNAL MEDICINE

## 2018-10-12 RX ORDER — BUDESONIDE AND FORMOTEROL FUMARATE DIHYDRATE 160; 4.5 UG/1; UG/1
1 AEROSOL RESPIRATORY (INHALATION) 2 TIMES DAILY
Refills: 0 | COMMUNITY
Start: 2018-10-12 | End: 2018-11-29 | Stop reason: ALTCHOICE

## 2018-10-12 NOTE — PROGRESS NOTES
REASON FOR VISIT:    Paul Avalos is a 68year old female who presents for a Medicare Annual Wellness visit.     female     Patient Care Team: Patient Care Team:  Alexandre Marti MD as PCP - General (Internal Medicine)    Patient Active Problem List: Glucose and HbA1c Latest Ref Rng & Units 10/8/2018 6/25/2018 4/3/2018 2/19/2018 8/28/2017 5/26/2017 4/17/2017   Glucose 70 - 99 mg/dL 95 82 84 84 85 79 79     Cholesterol Latest Ref Rng & Units 10/8/2018 8/28/2017 4/17/2017 9/2/2016 9/2/2015 8/21/201 Yes  Hearing Problems?: No     Functional Status     Hearing Problems?: No  Vision Problems? : No  Difficulty walking?: No  Difficulty dressing or bathing?: No  Problems with daily activities? : No  Memory Problems?: No      Fall/Risk Assessment     Have y 10/08/2018 4.4       Creatinine  Annually Creatinine (mg/dL)   Date Value   10/08/2018 1.06 (H)       Digoxin Serum Conc  Annually No results found for: DIGOXIN          ALLERGIES:     Ace Inhibitors          Coughing  MEDICAL INFORMATION:   Past Medical History    Tobacco Use      Smoking status: Never Smoker      Smokeless tobacco: Never Used    Alcohol use: Yes      Comment: social    Drug use: No       REVIEW OF SYSTEMS:   General/Constitutional:   General able to do usual activities good general state Neurologic:   Patient denies dizziness, fainting, loss of consciousness, weakness. Memory loss none. Tingling/numbness none. Trouble with balance none. Psychiatric:   Patient denies depression, hallucinations, memory loss. Anxiety none.  Insomnia occ  Varicose veins: not present. MUSCULOSKELETAL:   Cervical spines: normal.   L-S spines: normal.   Lower extremity joints: decreased range of motion -severe OA knees.    Upper extremity joints: normal.   NEUROLOGICAL:   Babinski: negative/all reflexes are

## 2018-10-15 DIAGNOSIS — I10 ESSENTIAL HYPERTENSION, BENIGN: ICD-10-CM

## 2018-10-17 DIAGNOSIS — I10 ESSENTIAL HYPERTENSION, BENIGN: ICD-10-CM

## 2018-10-17 RX ORDER — AMLODIPINE BESYLATE 5 MG/1
5 TABLET ORAL 2 TIMES DAILY
Qty: 90 TABLET | Refills: 0 | Status: SHIPPED | OUTPATIENT
Start: 2018-10-17 | End: 2018-11-25

## 2018-10-17 NOTE — TELEPHONE ENCOUNTER
Medication(s) to Refill:   Requested Prescriptions     Pending Prescriptions Disp Refills   • AmLODIPine Besylate 5 MG Oral Tab 90 tablet 0     Sig: Take 1 tablet (5 mg total) by mouth 2 (two) times daily.        Last Time Medication was Filled:  8/24/18

## 2018-10-19 RX ORDER — AMLODIPINE BESYLATE 5 MG/1
TABLET ORAL
Qty: 180 TABLET | Refills: 0 | OUTPATIENT
Start: 2018-10-19

## 2018-10-23 ENCOUNTER — TELEPHONE (OUTPATIENT)
Dept: INTERNAL MEDICINE CLINIC | Facility: CLINIC | Age: 73
End: 2018-10-23

## 2018-10-23 NOTE — TELEPHONE ENCOUNTER
Received incoming fax from [place] [id number] requesting to know if claim for [address] on 7/9/18 was authorized by provider. Noted pt had previous referral from them on 4/6/17 but pt did not get updated referral for this year.  Faxed paper back

## 2018-10-25 ENCOUNTER — HOSPITAL ENCOUNTER (OUTPATIENT)
Dept: MAMMOGRAPHY | Age: 73
Discharge: HOME | End: 2018-10-25
Attending: INTERNAL MEDICINE
Payer: MEDICARE

## 2018-10-25 DIAGNOSIS — R92.8 ABNORMAL MAMMOGRAM OF RIGHT BREAST: ICD-10-CM

## 2018-10-25 PROCEDURE — 77067 SCR MAMMO BI INCL CAD: CPT | Performed by: INTERNAL MEDICINE

## 2018-10-25 PROCEDURE — 77063 BREAST TOMOSYNTHESIS BI: CPT | Performed by: INTERNAL MEDICINE

## 2018-10-28 DIAGNOSIS — I10 ESSENTIAL HYPERTENSION, BENIGN: ICD-10-CM

## 2018-10-30 RX ORDER — HYDRALAZINE HYDROCHLORIDE 25 MG/1
TABLET, FILM COATED ORAL
Qty: 270 TABLET | Refills: 0 | Status: SHIPPED | OUTPATIENT
Start: 2018-10-30 | End: 2018-11-06

## 2018-10-30 NOTE — TELEPHONE ENCOUNTER
Medication(s) to Refill:   Requested Prescriptions     Pending Prescriptions Disp Refills   • HYDRALAZINE HCL 25 MG Oral Tab [Pharmacy Med Name: HYDRALAZINE  25MG TABLETS(ORANGE)] 270 tablet 0     Sig: TAKE 1 TABLET(25 MG) BY MOUTH THREE TIMES DAILY

## 2018-11-06 ENCOUNTER — TELEPHONE (OUTPATIENT)
Dept: INTERNAL MEDICINE CLINIC | Facility: CLINIC | Age: 73
End: 2018-11-06

## 2018-11-06 DIAGNOSIS — I10 ESSENTIAL HYPERTENSION, BENIGN: ICD-10-CM

## 2018-11-06 RX ORDER — HYDRALAZINE HYDROCHLORIDE 25 MG/1
TABLET, FILM COATED ORAL
Qty: 90 TABLET | Refills: 0 | OUTPATIENT
Start: 2018-11-06

## 2018-11-06 RX ORDER — PREDNISONE 1 MG/1
TABLET ORAL
Qty: 36 TABLET | Refills: 0 | Status: SHIPPED | OUTPATIENT
Start: 2018-11-06 | End: 2018-11-29 | Stop reason: ALTCHOICE

## 2018-11-06 RX ORDER — HYDRALAZINE HYDROCHLORIDE 25 MG/1
TABLET, FILM COATED ORAL
Qty: 270 TABLET | Refills: 0 | Status: SHIPPED | OUTPATIENT
Start: 2018-11-06 | End: 2019-01-27

## 2018-11-06 RX ORDER — CODEINE PHOSPHATE AND GUAIFENESIN 10; 100 MG/5ML; MG/5ML
5 SOLUTION ORAL EVERY 6 HOURS PRN
Qty: 240 ML | Refills: 0 | Status: SHIPPED | OUTPATIENT
Start: 2018-11-06 | End: 2018-11-16

## 2018-11-06 NOTE — TELEPHONE ENCOUNTER
Hydralazine Rx was filled on 10/30/18 - Pharmacy is telling patient that it was denied and that they do not have it.

## 2018-11-06 NOTE — TELEPHONE ENCOUNTER
Patient wants to know if Doctor can sent something to the pharmacy for cough, she still has it.  To the [facility] in [place]

## 2018-11-25 DIAGNOSIS — I10 ESSENTIAL HYPERTENSION, BENIGN: ICD-10-CM

## 2018-11-26 DIAGNOSIS — I10 ESSENTIAL HYPERTENSION, BENIGN: ICD-10-CM

## 2018-11-27 RX ORDER — AMLODIPINE BESYLATE 5 MG/1
TABLET ORAL
Qty: 90 TABLET | Refills: 0 | Status: SHIPPED | OUTPATIENT
Start: 2018-11-27 | End: 2018-12-19

## 2018-11-27 RX ORDER — AMLODIPINE BESYLATE 5 MG/1
5 TABLET ORAL 2 TIMES DAILY
Qty: 90 TABLET | Refills: 0 | OUTPATIENT
Start: 2018-11-27

## 2018-11-27 NOTE — TELEPHONE ENCOUNTER
Refill requested:   Requested Prescriptions     Pending Prescriptions Disp Refills   • AMLODIPINE BESYLATE 5 MG Oral Tab [Pharmacy Med Name: AMLODIPINE BESYLATE 5MG TABLETS] 90 tablet 0     Sig: TAKE 1 TABLET(5 MG) BY MOUTH TWICE DAILY       Passed protoco

## 2018-11-27 NOTE — TELEPHONE ENCOUNTER
Pt called for update of refill. Pt is leaving [facility] on Friday and would like to  refill before leaving.

## 2018-11-29 ENCOUNTER — OFFICE VISIT (OUTPATIENT)
Dept: INTERNAL MEDICINE CLINIC | Facility: CLINIC | Age: 73
End: 2018-11-29

## 2018-11-29 VITALS
WEIGHT: 266.5 LBS | OXYGEN SATURATION: 99 % | RESPIRATION RATE: 16 BRPM | HEART RATE: 70 BPM | BODY MASS INDEX: 47 KG/M2 | TEMPERATURE: 98 F | SYSTOLIC BLOOD PRESSURE: 140 MMHG | DIASTOLIC BLOOD PRESSURE: 88 MMHG

## 2018-11-29 DIAGNOSIS — R05.9 COUGH: Primary | ICD-10-CM

## 2018-11-29 PROBLEM — R92.8 ABNORMAL MAMMOGRAM OF RIGHT BREAST: Status: RESOLVED | Noted: 2017-09-15 | Resolved: 2018-11-29

## 2018-11-29 PROCEDURE — 99213 OFFICE O/P EST LOW 20 MIN: CPT | Performed by: INTERNAL MEDICINE

## 2018-11-29 RX ORDER — ALBUTEROL SULFATE 90 UG/1
2 AEROSOL, METERED RESPIRATORY (INHALATION) EVERY 6 HOURS PRN
Qty: 1 INHALER | Refills: 1 | Status: SHIPPED | OUTPATIENT
Start: 2018-11-29 | End: 2018-12-29

## 2018-11-29 RX ORDER — MECLIZINE HCL 12.5 MG/1
12.5 TABLET ORAL 3 TIMES DAILY PRN
Qty: 60 TABLET | Refills: 0 | Status: SHIPPED | OUTPATIENT
Start: 2018-11-29 | End: 2018-12-29

## 2018-11-29 RX ORDER — BUDESONIDE AND FORMOTEROL FUMARATE DIHYDRATE 160; 4.5 UG/1; UG/1
1 AEROSOL RESPIRATORY (INHALATION) 2 TIMES DAILY
Refills: 0 | COMMUNITY
Start: 2018-11-29 | End: 2019-01-14 | Stop reason: ALTCHOICE

## 2018-11-29 NOTE — PROGRESS NOTES
Liset Parra  1945 is a 68year old female who presents for upper respiratory symptoms    Patient presents with:  Cough  Vertigo        HPI:   Pt reports  respiratory symptoms for few weeks. Does have cough nonproductive.   Was in [place] went daily.  Take with a meal Disp:  Rfl:       Past Medical History:   Diagnosis Date   • Arthritis of knee    • Chest pain of uncertain etiology 0/36/4251   • Esophageal reflux    • High blood pressure    • Hyperlipidemia    • ASHWINI (obstructive sleep apnea) time to workup her cough  Patient Instructions   Requesting refill for meclizine.   Patient will return to [facility] December 18 after which workup will be initiated for her cough including blood work helical CT of the chest PFT etc.  Since cough could be

## 2018-11-29 NOTE — PATIENT INSTRUCTIONS
Requesting refill for meclizine.   Patient will return to [facility] December 18 after which workup will be initiated for her cough including blood work helical CT of the chest PFT etc.  Since cough could be multifactorial including allergies obstructive sl

## 2018-12-19 ENCOUNTER — PATIENT MESSAGE (OUTPATIENT)
Dept: INTERNAL MEDICINE CLINIC | Facility: CLINIC | Age: 73
End: 2018-12-19

## 2018-12-19 DIAGNOSIS — I10 ESSENTIAL HYPERTENSION, BENIGN: ICD-10-CM

## 2018-12-19 DIAGNOSIS — G47.33 OBSTRUCTIVE SLEEP APNEA: Primary | Chronic | ICD-10-CM

## 2018-12-20 RX ORDER — AMLODIPINE BESYLATE 5 MG/1
TABLET ORAL
Qty: 90 TABLET | Refills: 0 | Status: SHIPPED | OUTPATIENT
Start: 2018-12-20 | End: 2019-03-03

## 2018-12-20 RX ORDER — AMLODIPINE BESYLATE 5 MG/1
TABLET ORAL
Qty: 90 TABLET | Refills: 0 | OUTPATIENT
Start: 2018-12-20

## 2018-12-20 NOTE — TELEPHONE ENCOUNTER
From: Tj Tiwari  To: Jose M Klein MD  Sent: 12/19/2018 5:02 PM CST  Subject: Referral Request    Please put in a referral for Doctor Gladys Payne I have an appointment on Friday December 21. . They need the referral put in ASAP. Ryan Joiner Thanks

## 2018-12-31 DIAGNOSIS — R05.9 COUGH: ICD-10-CM

## 2019-01-01 NOTE — TELEPHONE ENCOUNTER
Refill requested:   Requested Prescriptions     Pending Prescriptions Disp Refills   • VENTOLIN  (90 Base) MCG/ACT Inhalation Aero Soln [Pharmacy Med Name: VENTOLIN HFA INH W/DOS CTR 200PUFFS] 18 g 0     Sig: INHALE 2 PUFFS INTO THE LUNGS EVERY 6 HO

## 2019-01-14 ENCOUNTER — OFFICE VISIT (OUTPATIENT)
Dept: INTERNAL MEDICINE CLINIC | Facility: CLINIC | Age: 74
End: 2019-01-14
Payer: COMMERCIAL

## 2019-01-14 VITALS
HEIGHT: 64 IN | SYSTOLIC BLOOD PRESSURE: 136 MMHG | DIASTOLIC BLOOD PRESSURE: 84 MMHG | RESPIRATION RATE: 16 BRPM | HEART RATE: 65 BPM | TEMPERATURE: 99 F | OXYGEN SATURATION: 97 % | WEIGHT: 274 LBS | BODY MASS INDEX: 46.78 KG/M2

## 2019-01-14 DIAGNOSIS — R05.9 COUGH: Primary | ICD-10-CM

## 2019-01-14 PROCEDURE — 99213 OFFICE O/P EST LOW 20 MIN: CPT | Performed by: INTERNAL MEDICINE

## 2019-01-14 RX ORDER — RANITIDINE 150 MG/1
TABLET ORAL
Qty: 180 TABLET | Refills: 1 | COMMUNITY
Start: 2019-01-14 | End: 2019-04-19

## 2019-01-14 NOTE — PATIENT INSTRUCTIONS
Wants to  continue present management. If she had no resolution for symptoms and 2-3 months will consider repeat ENT eval possibly allergy testing.   Patient has no symptoms to suggest allergies or any sinus issues at this moment  Also consider further blo

## 2019-01-14 NOTE — PROGRESS NOTES
Liset Parra  1945 is a 68year old female who presents for upper respiratory symptoms    Patient presents with:  Cough        HPI:   Office some better. Did see APN at the sleep lab apparently a PFT was done which was unremarkable.   They could hypertension    • Visual impairment     glasses for distance      Social History    Tobacco Use      Smoking status: Never Smoker      Smokeless tobacco: Never Used    Alcohol use: Yes      Comment: social    Drug use: No        REVIEW OF SYSTEMS:   GENERA

## 2019-01-27 DIAGNOSIS — I10 ESSENTIAL HYPERTENSION, BENIGN: ICD-10-CM

## 2019-01-28 RX ORDER — HYDRALAZINE HYDROCHLORIDE 25 MG/1
TABLET, FILM COATED ORAL
Qty: 270 TABLET | Refills: 0 | Status: SHIPPED | OUTPATIENT
Start: 2019-01-28 | End: 2019-04-25

## 2019-01-28 NOTE — TELEPHONE ENCOUNTER
Last office visit 1/14/19 with Dr. Yaron Alejandro. Last refill 11/6/18 (#270, 0 refills). Last labs 10/8/18.

## 2019-01-29 ENCOUNTER — PATIENT MESSAGE (OUTPATIENT)
Dept: INTERNAL MEDICINE CLINIC | Facility: CLINIC | Age: 74
End: 2019-01-29

## 2019-01-29 DIAGNOSIS — J02.9 SORE THROAT: Primary | ICD-10-CM

## 2019-01-29 NOTE — TELEPHONE ENCOUNTER
From: Paul Avalos  To:  Alexandre Marti MD  Sent: 1/29/2019 12:29 PM CST  Subject: Referral Request    Can I get a referral to see a ENT doctor because I still have the scratchy throat and coughing

## 2019-01-31 NOTE — TELEPHONE ENCOUNTER
Referral pended for approval to see ENT for her continual \"scratchy throat and cough\". Please advise.

## 2019-02-28 VITALS
HEIGHT: 64 IN | SYSTOLIC BLOOD PRESSURE: 134 MMHG | BODY MASS INDEX: 45.24 KG/M2 | WEIGHT: 265 LBS | HEART RATE: 70 BPM | DIASTOLIC BLOOD PRESSURE: 70 MMHG

## 2019-03-03 DIAGNOSIS — I10 ESSENTIAL HYPERTENSION, BENIGN: ICD-10-CM

## 2019-03-04 RX ORDER — AMLODIPINE BESYLATE 5 MG/1
TABLET ORAL
Qty: 180 TABLET | Refills: 0 | Status: SHIPPED | OUTPATIENT
Start: 2019-03-04 | End: 2019-04-01

## 2019-03-04 RX ORDER — PANTOPRAZOLE SODIUM 40 MG/1
TABLET, DELAYED RELEASE ORAL
Qty: 30 TABLET | Refills: 0 | Status: SHIPPED | OUTPATIENT
Start: 2019-03-04 | End: 2019-04-01

## 2019-03-04 RX ORDER — TEMAZEPAM 15 MG/1
CAPSULE ORAL
Qty: 30 CAPSULE | Refills: 0 | Status: SHIPPED | OUTPATIENT
Start: 2019-03-04 | End: 2019-09-05

## 2019-03-04 NOTE — TELEPHONE ENCOUNTER
No protocol    Requesting Pantoprazole Sodium 40 MG Oral Tab EC  LOV: 1/14/19  RTC: 3 months  Last Relevant Labs: 10/8/18  Filled: 1/2/19 #30 with 1 refills      Requesting temazepam 15 MG Oral Cap  Filled: 7/5/18 #30 with 0 refills    No future appointmen

## 2019-03-04 NOTE — TELEPHONE ENCOUNTER
Last OV: 1/14/19 with Dr. Karl Holly  Last refill date: 12/20/18     #/refills: #90, 0 refills  When pt was asked to return for OV: 3 months  Upcoming appt/reason: no upcoming appt  Last labs 10/8/18

## 2019-03-06 DIAGNOSIS — I10 ESSENTIAL HYPERTENSION, BENIGN: ICD-10-CM

## 2019-03-06 RX ORDER — HYDRALAZINE HYDROCHLORIDE 25 MG/1
TABLET, FILM COATED ORAL
Qty: 270 TABLET | Refills: 3 | Status: CANCELLED | OUTPATIENT
Start: 2019-03-06

## 2019-03-06 RX ORDER — TRIAMTERENE AND HYDROCHLOROTHIAZIDE 37.5; 25 MG/1; MG/1
1 CAPSULE ORAL EVERY MORNING
Qty: 90 CAPSULE | Refills: 1 | Status: SHIPPED | OUTPATIENT
Start: 2019-03-06 | End: 2019-07-09

## 2019-03-06 RX ORDER — RANITIDINE 150 MG/1
TABLET ORAL
Qty: 180 TABLET | Refills: 3 | Status: CANCELLED | OUTPATIENT
Start: 2019-03-06

## 2019-03-06 NOTE — TELEPHONE ENCOUNTER
Triamterene-HCTZ sent to mail service. Unable to send other two as pt has enough medications for 2 more months. See [employer] message from 3/6/19.

## 2019-03-06 NOTE — TELEPHONE ENCOUNTER
Aj Manuel, calling from [facility], requesting a refill for the following medications (for pt):    hydrALAzine HCl 25 MG Oral Tab  Triamterene-HCTZ 37.5-25 MG Oral Cap  RANITIDINE  MG Oral Tab    Preferred Pharmacy:  [facility]: [phone]  Fax: [phone]

## 2019-03-09 ENCOUNTER — PATIENT MESSAGE (OUTPATIENT)
Dept: INTERNAL MEDICINE CLINIC | Facility: CLINIC | Age: 74
End: 2019-03-09

## 2019-03-11 NOTE — TELEPHONE ENCOUNTER
From: Margaret Goff  To:  Idania Fajardo MD  Sent: 3/9/2019 1:22 PM CST  Subject: Prescription Question    When can I  the prescription for the following reference refill request on 3/4/19    TEMAZEPAM 15 MG Oral Cap

## 2019-04-01 DIAGNOSIS — I10 ESSENTIAL HYPERTENSION, BENIGN: ICD-10-CM

## 2019-04-01 RX ORDER — AMLODIPINE BESYLATE 5 MG/1
TABLET ORAL
Qty: 180 TABLET | Refills: 2 | Status: SHIPPED | OUTPATIENT
Start: 2019-04-01 | End: 2019-10-12

## 2019-04-01 NOTE — TELEPHONE ENCOUNTER
Protocol passed.      Medication(s) to Refill:   Requested Prescriptions     Pending Prescriptions Disp Refills   • amLODIPine Besylate 5 MG Oral Tab 180 tablet 2     Sig: TAKE 1 TABLET(5 MG) BY MOUTH TWICE DAILY   • Pantoprazole Sodium 40 MG Oral Tab EC 90

## 2019-04-02 RX ORDER — PANTOPRAZOLE SODIUM 40 MG/1
TABLET, DELAYED RELEASE ORAL
Qty: 90 TABLET | Refills: 0 | Status: SHIPPED | OUTPATIENT
Start: 2019-04-02 | End: 2019-05-21

## 2019-04-04 ENCOUNTER — TELEPHONE (OUTPATIENT)
Dept: INTERNAL MEDICINE CLINIC | Facility: CLINIC | Age: 74
End: 2019-04-04

## 2019-04-04 DIAGNOSIS — J32.0 CHRONIC MAXILLARY SINUSITIS: ICD-10-CM

## 2019-04-04 DIAGNOSIS — J32.2 CHRONIC ETHMOIDAL SINUSITIS: Primary | ICD-10-CM

## 2019-04-04 NOTE — TELEPHONE ENCOUNTER
Patient was referred to ENT Dr. Harini Izquierdo, who then advised patient to further test condition and have \"CT SINUS W/ CON\"  Patient states order must entered by PCP

## 2019-04-04 NOTE — TELEPHONE ENCOUNTER
Spoke with pt and advised her that she has a referral for that provider which allows them to place orders.  Pt stated the specialist could place the order but they have the CT's done at their location and that location is not contracted with pt's insurance

## 2019-04-12 ENCOUNTER — HOSPITAL ENCOUNTER (OUTPATIENT)
Dept: CT IMAGING | Age: 74
Discharge: HOME | End: 2019-04-12
Attending: INTERNAL MEDICINE
Payer: MEDICARE

## 2019-04-12 DIAGNOSIS — J32.0 CHRONIC MAXILLARY SINUSITIS: ICD-10-CM

## 2019-04-12 DIAGNOSIS — J32.2 CHRONIC ETHMOIDAL SINUSITIS: ICD-10-CM

## 2019-04-12 PROCEDURE — 70486 CT MAXILLOFACIAL W/O DYE: CPT | Performed by: INTERNAL MEDICINE

## 2019-04-18 DIAGNOSIS — I10 ESSENTIAL HYPERTENSION, BENIGN: ICD-10-CM

## 2019-04-18 RX ORDER — HYDRALAZINE HYDROCHLORIDE 25 MG/1
TABLET, FILM COATED ORAL
Qty: 270 TABLET | Refills: 0 | Status: CANCELLED | OUTPATIENT
Start: 2019-04-18

## 2019-04-23 DIAGNOSIS — I10 ESSENTIAL HYPERTENSION, BENIGN: ICD-10-CM

## 2019-04-24 ENCOUNTER — PATIENT MESSAGE (OUTPATIENT)
Dept: INTERNAL MEDICINE CLINIC | Facility: CLINIC | Age: 74
End: 2019-04-24

## 2019-04-24 DIAGNOSIS — I10 ESSENTIAL HYPERTENSION, BENIGN: ICD-10-CM

## 2019-04-24 NOTE — TELEPHONE ENCOUNTER
From: Bernardo Gaona  To: Jossie Donaldson MD  Sent: 4/24/2019 8:40 AM CDT  Subject: Prescription Question    Can you send my prescription for HYDRALAZINE TAB 25MG and RANITIDINE TAB 150MG to [facility]. .I'm transferring to home delivery from [facility] AT [place]

## 2019-04-25 RX ORDER — HYDRALAZINE HYDROCHLORIDE 25 MG/1
TABLET, FILM COATED ORAL
Qty: 270 TABLET | Refills: 0 | Status: SHIPPED | OUTPATIENT
Start: 2019-04-25 | End: 2019-06-13

## 2019-04-25 NOTE — TELEPHONE ENCOUNTER
Passed protocol    Medication(s) to Refill:   Requested Prescriptions     Pending Prescriptions Disp Refills   • hydrALAzine HCl 25 MG Oral Tab 270 tablet 0     Sig: TAKE 1 TABLET BY MOUTH THREE TIMES DAILY       Last Time Medication was Filled:  1/28/19

## 2019-04-26 RX ORDER — HYDRALAZINE HYDROCHLORIDE 25 MG/1
TABLET, FILM COATED ORAL
Qty: 270 TABLET | Refills: 0 | OUTPATIENT
Start: 2019-04-26

## 2019-04-26 NOTE — TELEPHONE ENCOUNTER
Duplicate request - Rx sent to [facility] on 4/25/19    Medication(s) to Refill:   Requested Prescriptions     Pending Prescriptions Disp Refills   • hydrALAzine HCl 25 MG Oral Tab 270 tablet 0     Sig: TAKE 1 TABLET BY MOUTH THREE TIMES DAILY       Last Time

## 2019-05-03 ENCOUNTER — PATIENT MESSAGE (OUTPATIENT)
Dept: INTERNAL MEDICINE CLINIC | Facility: CLINIC | Age: 74
End: 2019-05-03

## 2019-05-03 DIAGNOSIS — R12 HEARTBURN: Primary | ICD-10-CM

## 2019-05-03 NOTE — TELEPHONE ENCOUNTER
From: Jacob Gunter  To: Isael Lundberg MD  Sent: 5/3/2019 12:10 PM CDT  Subject: Referral Request    I need a referral to see [facility], I received the following message from him on my chart. Hammad Celaya    You are overdue for a follow-up office visit appo

## 2019-05-07 ENCOUNTER — OFFICE VISIT (OUTPATIENT)
Dept: INTERNAL MEDICINE CLINIC | Facility: CLINIC | Age: 74
End: 2019-05-07
Payer: COMMERCIAL

## 2019-05-07 VITALS
TEMPERATURE: 98 F | HEIGHT: 64 IN | BODY MASS INDEX: 42.21 KG/M2 | SYSTOLIC BLOOD PRESSURE: 120 MMHG | RESPIRATION RATE: 16 BRPM | DIASTOLIC BLOOD PRESSURE: 88 MMHG | WEIGHT: 247.25 LBS | OXYGEN SATURATION: 98 % | HEART RATE: 73 BPM

## 2019-05-07 DIAGNOSIS — R05.9 COUGH: Primary | ICD-10-CM

## 2019-05-07 DIAGNOSIS — G47.33 OBSTRUCTIVE SLEEP APNEA: ICD-10-CM

## 2019-05-07 PROCEDURE — 99213 OFFICE O/P EST LOW 20 MIN: CPT | Performed by: INTERNAL MEDICINE

## 2019-05-07 RX ORDER — ALPRAZOLAM 0.5 MG/1
0.5 TABLET ORAL 3 TIMES DAILY PRN
Qty: 15 TABLET | Refills: 0 | Status: SHIPPED | OUTPATIENT
Start: 2019-05-07 | End: 2019-12-30

## 2019-05-07 NOTE — PATIENT INSTRUCTIONS
Patient wants to try over-the-counter Claritin.   She will have the ENT doctor send information over

## 2019-05-07 NOTE — PROGRESS NOTES
Melly Burgos DOB 1945 is a 68year old female who presents for upper respiratory symptoms    Patient presents with:  Cough        HPI:   Office some better. Did see APN at the sleep lab apparently a PFT was done which was unremarkable.   They could with a meal Disp:  Rfl:       Past Medical History:   Diagnosis Date   • Arthritis of knee    • Chest pain of uncertain etiology 9/98/5741   • Esophageal reflux    • High blood pressure    • Hyperlipidemia    • ASHWINI (obstructive sleep apnea)    • Osteoarthr the plan. The patient is asked to Return in about 3 months (around 8/7/2019). Dwayne Hermosillo MD

## 2019-05-22 ENCOUNTER — PATIENT MESSAGE (OUTPATIENT)
Dept: INTERNAL MEDICINE CLINIC | Facility: CLINIC | Age: 74
End: 2019-05-22

## 2019-05-22 DIAGNOSIS — R12 HEARTBURN: Primary | ICD-10-CM

## 2019-05-22 NOTE — TELEPHONE ENCOUNTER
Pantoprazole 40 mg 1 tab daily filled 4/2/19 90 with 0 refills     LOV 5/7/19    Return in about 3 months (around 8/7/2019). .  No upcoming apt on file   Labs 10/8/18  Does not meet protocol

## 2019-05-23 RX ORDER — PANTOPRAZOLE SODIUM 40 MG/1
TABLET, DELAYED RELEASE ORAL
Qty: 90 TABLET | Refills: 0 | Status: SHIPPED | OUTPATIENT
Start: 2019-05-23 | End: 2019-07-11

## 2019-06-12 ENCOUNTER — TELEPHONE (OUTPATIENT)
Dept: INTERNAL MEDICINE CLINIC | Facility: CLINIC | Age: 74
End: 2019-06-12

## 2019-06-12 DIAGNOSIS — R12 HEARTBURN: Primary | ICD-10-CM

## 2019-06-12 NOTE — TELEPHONE ENCOUNTER
GI REFERRAL   Received:  Today   Message Contents   Hanna [facility] Clinical Staff Cc: [facility] Referral Pool   Phone Number: [phone]             .Reason for the order/referral: HEARTBURN   PCP: [facility] [place]   Refer to Provider (first and edy

## 2019-06-12 NOTE — TELEPHONE ENCOUNTER
Spoke with pt as referral was entered and authorized for Dr. Mercedes Ellis. Pt stated that Dr. Johnathon Lopez office cannot see pt until Aug and Dr. Edison Nicole office can see her in July. New referral entered.  Per pt - referral needs to be faxed as soon as auth to [phone]

## 2019-06-12 NOTE — TELEPHONE ENCOUNTER
Referral authorized and correct fax number received from [address] of [phone]. Fax confirmation received. [employer] message sent to pt with update.

## 2019-06-13 DIAGNOSIS — I10 ESSENTIAL HYPERTENSION, BENIGN: ICD-10-CM

## 2019-06-13 DIAGNOSIS — R05.9 COUGH: ICD-10-CM

## 2019-06-13 RX ORDER — HYDRALAZINE HYDROCHLORIDE 25 MG/1
TABLET, FILM COATED ORAL
Qty: 270 TABLET | Refills: 0 | Status: SHIPPED | OUTPATIENT
Start: 2019-06-13 | End: 2019-08-29

## 2019-06-13 RX ORDER — ALBUTEROL SULFATE 90 UG/1
AEROSOL, METERED RESPIRATORY (INHALATION)
Qty: 18 G | Refills: 0 | Status: SHIPPED | OUTPATIENT
Start: 2019-06-13 | End: 2019-08-07

## 2019-06-13 NOTE — TELEPHONE ENCOUNTER
Passed protocol     Last refill:  4/27/2019 #270 NR    LOV:   5/7/2019 Dr Shala Pacheco RTC 3 months     No FOV scheduled

## 2019-06-13 NOTE — TELEPHONE ENCOUNTER
Pt is in [place] and is requesting inhaler for her continued cough.      Failed protocol     Last refill:  1/2/2019 Ventolin Inh 18G NR     5/7/2019 Dr Levi Shine RTC 3 months       No FOV scheduled

## 2019-07-09 DIAGNOSIS — I10 ESSENTIAL HYPERTENSION, BENIGN: ICD-10-CM

## 2019-07-11 RX ORDER — TRIAMTERENE AND HYDROCHLOROTHIAZIDE 37.5; 25 MG/1; MG/1
1 CAPSULE ORAL EVERY MORNING
Qty: 90 CAPSULE | Refills: 1 | Status: SHIPPED | OUTPATIENT
Start: 2019-07-11 | End: 2019-12-05

## 2019-07-11 NOTE — TELEPHONE ENCOUNTER
Passed protocol    Medication(s) to Refill:   Requested Prescriptions     Pending Prescriptions Disp Refills   • TRIAMTERENE-HCTZ 37.5-25 MG Oral Cap [Pharmacy Med Name: TRIAMT-HCTZ 37.5-25MG CAPSULE] 90 capsule 1     Sig: TAKE 1 CAPSULE BY MOUTH  EVERY MO

## 2019-07-12 RX ORDER — PANTOPRAZOLE SODIUM 40 MG/1
TABLET, DELAYED RELEASE ORAL
Qty: 90 TABLET | Refills: 0 | Status: SHIPPED | OUTPATIENT
Start: 2019-07-12 | End: 2019-12-05

## 2019-07-12 NOTE — TELEPHONE ENCOUNTER
No protocol    Requesting Pantoprazole Sodium 40 MG Oral Tab EC  LOV: 5/7/19  RTC: 3 months  Last Relevant Labs: 10/8/18  Filled: 5/23/19 #90 with 0 refills    No future appointments.

## 2019-08-07 ENCOUNTER — OFFICE VISIT (OUTPATIENT)
Dept: INTERNAL MEDICINE CLINIC | Facility: CLINIC | Age: 74
End: 2019-08-07
Payer: COMMERCIAL

## 2019-08-07 VITALS
BODY MASS INDEX: 41.19 KG/M2 | OXYGEN SATURATION: 98 % | WEIGHT: 241.25 LBS | HEIGHT: 64 IN | SYSTOLIC BLOOD PRESSURE: 134 MMHG | RESPIRATION RATE: 12 BRPM | TEMPERATURE: 99 F | HEART RATE: 61 BPM | DIASTOLIC BLOOD PRESSURE: 84 MMHG

## 2019-08-07 DIAGNOSIS — R05.9 COUGH: Primary | ICD-10-CM

## 2019-08-07 PROCEDURE — 99213 OFFICE O/P EST LOW 20 MIN: CPT | Performed by: INTERNAL MEDICINE

## 2019-08-07 RX ORDER — CROMOLYN SODIUM 5.2 MG
1 AEROSOL, SPRAY WITH PUMP (ML) NASAL 4 TIMES DAILY
Qty: 13 ML | Refills: 0 | Status: SHIPPED | OUTPATIENT
Start: 2019-08-07 | End: 2019-12-05 | Stop reason: ALTCHOICE

## 2019-08-07 NOTE — PROGRESS NOTES
Melly Burgos DOB 1945 is a 69year old female who presents for upper respiratory symptoms    Patient presents with:  Cough        HPI:   Office some better. Did see APN at the sleep lab apparently a PFT was done which was unremarkable.   They could needed. Disp:  Rfl:    vitamin E 400 UNITS Oral Cap Take 1 capsule by mouth daily. Disp:  Rfl:    omega-3 fatty acids (FISH OIL) 1000 MG Oral Cap Take 1,000 mg by mouth daily.  Disp:  Rfl:    Calcium 1500 MG Oral Tab Take 1 tablet by mouth 2 (two) times d visit:    Cough  -     Tiotropium Bromide Monohydrate (SPIRIVA HANDIHALER) 18 MCG Inhalation Cap; Inhale 1 capsule (18 mcg total) into the lungs daily. -     Cromolyn Sodium 5.2 MG/ACT Nasal Aero Soln; 1 spray by Nasal route 4 (four) times daily.       Raf Ball

## 2019-08-29 DIAGNOSIS — I10 ESSENTIAL HYPERTENSION, BENIGN: ICD-10-CM

## 2019-08-29 RX ORDER — HYDRALAZINE HYDROCHLORIDE 25 MG/1
TABLET, FILM COATED ORAL
Qty: 270 TABLET | Refills: 0 | Status: SHIPPED | OUTPATIENT
Start: 2019-08-29 | End: 2019-12-05

## 2019-08-29 NOTE — TELEPHONE ENCOUNTER
Passed protocol    Requesting hydrALAzine HCl 25 MG Oral Tab  LOV: 8/7/19  RTC: 3 months  Last Relevant Labs: 10/8/18  Filled: 6/13/19 #270 with 0 refills    No future appointments.

## 2019-09-06 RX ORDER — TEMAZEPAM 15 MG/1
CAPSULE ORAL
Qty: 30 CAPSULE | Refills: 0 | Status: SHIPPED | OUTPATIENT
Start: 2019-09-06 | End: 2020-05-04

## 2019-09-06 NOTE — TELEPHONE ENCOUNTER
No protocol    Requesting TEMAZEPAM 15 MG Oral Cap  LOV: 8/7/19  RTC: 3 months  Last Relevant Labs: 10/08/18  Filled: 3/4/19 #30 with 0 refills    No future appointments.

## 2019-10-12 DIAGNOSIS — I10 ESSENTIAL HYPERTENSION, BENIGN: ICD-10-CM

## 2019-10-14 NOTE — TELEPHONE ENCOUNTER
Failed protocol - labs needed    Requesting amLODIPine Besylate 5 MG Oral Tab  LOV: 8/7/19  RTC: 3 months  Last Relevant Labs: 10/8/18  Filled: 4/1/19 #180 with 2 refills    Future Appointments   Date Time Provider Roberta Pandey   10/31/2019 10:30 AM M

## 2019-10-15 RX ORDER — AMLODIPINE BESYLATE 5 MG/1
TABLET ORAL
Qty: 180 TABLET | Refills: 2 | Status: SHIPPED | OUTPATIENT
Start: 2019-10-15 | End: 2020-05-22

## 2019-10-31 ENCOUNTER — OFFICE VISIT (OUTPATIENT)
Dept: INTERNAL MEDICINE CLINIC | Facility: CLINIC | Age: 74
End: 2019-10-31
Payer: COMMERCIAL

## 2019-10-31 VITALS
RESPIRATION RATE: 16 BRPM | BODY MASS INDEX: 42.12 KG/M2 | HEIGHT: 64 IN | TEMPERATURE: 98 F | DIASTOLIC BLOOD PRESSURE: 74 MMHG | HEART RATE: 78 BPM | OXYGEN SATURATION: 97 % | SYSTOLIC BLOOD PRESSURE: 124 MMHG | WEIGHT: 246.75 LBS

## 2019-10-31 DIAGNOSIS — Z00.00 ROUTINE GENERAL MEDICAL EXAMINATION AT A HEALTH CARE FACILITY: Primary | ICD-10-CM

## 2019-10-31 DIAGNOSIS — E78.00 PURE HYPERCHOLESTEROLEMIA: ICD-10-CM

## 2019-10-31 DIAGNOSIS — G47.33 OBSTRUCTIVE SLEEP APNEA: ICD-10-CM

## 2019-10-31 DIAGNOSIS — R05.9 COUGH: ICD-10-CM

## 2019-10-31 DIAGNOSIS — I10 ESSENTIAL HYPERTENSION, BENIGN: ICD-10-CM

## 2019-10-31 PROCEDURE — 93000 ELECTROCARDIOGRAM COMPLETE: CPT | Performed by: INTERNAL MEDICINE

## 2019-10-31 PROCEDURE — 90662 IIV NO PRSV INCREASED AG IM: CPT | Performed by: INTERNAL MEDICINE

## 2019-10-31 PROCEDURE — 96160 PT-FOCUSED HLTH RISK ASSMT: CPT | Performed by: INTERNAL MEDICINE

## 2019-10-31 PROCEDURE — G0008 ADMIN INFLUENZA VIRUS VAC: HCPCS | Performed by: INTERNAL MEDICINE

## 2019-10-31 PROCEDURE — G0439 PPPS, SUBSEQ VISIT: HCPCS | Performed by: INTERNAL MEDICINE

## 2019-10-31 PROCEDURE — 99397 PER PM REEVAL EST PAT 65+ YR: CPT | Performed by: INTERNAL MEDICINE

## 2019-10-31 NOTE — PROGRESS NOTES
REASON FOR VISIT:    Ricky Schulz is a 69year old female who presents for a Medicare Annual Wellness visit.     female     Patient Care Team: Patient Care Team:  Yodit Pedroza MD as PCP - General (Internal Medicine)    Patient Active Problem List: 8/28/2017 5/26/2017 4/17/2017   Glucose 70 - 99 mg/dL 95 82 84 84 85 79 79     Cholesterol Latest Ref Rng & Units 10/8/2018 8/28/2017 4/17/2017 9/2/2016 9/2/2015 8/21/2014   Total Cholesterol <200 mg/dL 177 187 176 189 197 176   Triglycerides 30 - 149 mg/d Fecal Occult Blood Annually No results found for: FOB, OCCULTSTOOL   Glaucoma Screening     Ophthalmology Visit Annually yes   Immunizations     Zoster (Not covered by Medicare Part B) No orders found for this or any previous visit.      SPECIFIC DISEASE MO FLUARIX QUAD PRESERVE FREE SINGLE DOSE (58754) FLU CLINIC                          11/01/2016      FLUAD High Dose 65 yr and older (30748)                          10/05/2018      FLUZONE 3 Yrs+ Quad Prsv Free 0.5 ml (00960)                          10/16/ Only:   Patient denies breast pain. axillary nodes . Breast lumps or discharge none. Mammogram up-to-date yes. Await biopsy  Genitourinary:   Patient denies difficulty urinating, frequent urination, hematuria. Dysuria none. Nocturia None.  Urinary frequency Breast Mass: no palpable masses. General: unremarkable. Nipple Abnormality: none. Skin Change: none. LUNGS:   Auscultation: clear .    Chest Shape: normal .   Percussion: normal.   Rales: no .   Respiratory effort: normal .   Rhonchi: no.   Wheeze apnea unable to tolerate mask in view of cough    Pure hypercholesterolemia await lab work  -     LIPID PANEL    Cough  -     ALLERGY - INTERNAL    Other orders  -     FLU VACC HIGH DOSE PRSV FREE    EKG shows the patient a heart rate of 76 TX interval 196

## 2019-11-05 ENCOUNTER — APPOINTMENT (OUTPATIENT)
Dept: LAB | Age: 74
End: 2019-11-05
Attending: INTERNAL MEDICINE
Payer: MEDICARE

## 2019-11-18 ENCOUNTER — HOSPITAL ENCOUNTER (OUTPATIENT)
Dept: MAMMOGRAPHY | Age: 74
Discharge: HOME | End: 2019-11-18
Attending: INTERNAL MEDICINE
Payer: MEDICARE

## 2019-11-18 ENCOUNTER — HOSPITAL ENCOUNTER (OUTPATIENT)
Dept: BONE DENSITY | Age: 74
Discharge: HOME | End: 2019-11-18
Attending: INTERNAL MEDICINE
Payer: MEDICARE

## 2019-11-18 DIAGNOSIS — Z00.00 ROUTINE GENERAL MEDICAL EXAMINATION AT A HEALTH CARE FACILITY: ICD-10-CM

## 2019-11-18 PROCEDURE — 77080 DXA BONE DENSITY AXIAL: CPT | Performed by: INTERNAL MEDICINE

## 2019-11-18 PROCEDURE — 77063 BREAST TOMOSYNTHESIS BI: CPT | Performed by: INTERNAL MEDICINE

## 2019-11-18 PROCEDURE — 77067 SCR MAMMO BI INCL CAD: CPT | Performed by: INTERNAL MEDICINE

## 2019-12-05 ENCOUNTER — OFFICE VISIT (OUTPATIENT)
Dept: INTERNAL MEDICINE CLINIC | Facility: CLINIC | Age: 74
End: 2019-12-05
Payer: COMMERCIAL

## 2019-12-05 ENCOUNTER — PATIENT MESSAGE (OUTPATIENT)
Dept: INTERNAL MEDICINE CLINIC | Facility: CLINIC | Age: 74
End: 2019-12-05

## 2019-12-05 VITALS
DIASTOLIC BLOOD PRESSURE: 72 MMHG | HEIGHT: 64 IN | BODY MASS INDEX: 42.08 KG/M2 | TEMPERATURE: 99 F | HEART RATE: 69 BPM | SYSTOLIC BLOOD PRESSURE: 116 MMHG | RESPIRATION RATE: 16 BRPM | WEIGHT: 246.5 LBS | OXYGEN SATURATION: 100 %

## 2019-12-05 DIAGNOSIS — I10 ESSENTIAL HYPERTENSION, BENIGN: ICD-10-CM

## 2019-12-05 DIAGNOSIS — E78.00 PURE HYPERCHOLESTEROLEMIA: ICD-10-CM

## 2019-12-05 DIAGNOSIS — R05.9 COUGH: Primary | ICD-10-CM

## 2019-12-05 PROCEDURE — 99213 OFFICE O/P EST LOW 20 MIN: CPT | Performed by: INTERNAL MEDICINE

## 2019-12-05 RX ORDER — FAMOTIDINE 40 MG/1
40 TABLET, FILM COATED ORAL NIGHTLY PRN
Qty: 90 TABLET | Refills: 3 | Status: SHIPPED | OUTPATIENT
Start: 2019-12-05 | End: 2020-02-24

## 2019-12-05 RX ORDER — CHOLECALCIFEROL (VITAMIN D3) 50 MCG
1 TABLET ORAL DAILY
Qty: 90 TABLET | Refills: 3 | Status: SHIPPED | OUTPATIENT
Start: 2019-12-05 | End: 2020-03-04

## 2019-12-05 NOTE — PROGRESS NOTES
Mabel Hanna  1945 is a 69year old female. Patient presents with:  Test Results      HPI:   Cough much better. Did see allergist.  Here for lab results.   Current Outpatient Medications   Medication Sig Dispense Refill   • Cholecalciferol (VI status: Never Smoker      Smokeless tobacco: Never Used    Alcohol use: Yes      Frequency: Monthly or less      Comment: social    Drug use: No       REVIEW OF SYSTEMS:   na        EXAM:   /72   Pulse 69   Temp 98.5 °F (36.9 °C) (Oral)   Resp 16   H

## 2019-12-06 RX ORDER — HYDRALAZINE HYDROCHLORIDE 25 MG/1
TABLET, FILM COATED ORAL
Qty: 270 TABLET | Refills: 0 | Status: SHIPPED | OUTPATIENT
Start: 2019-12-06 | End: 2020-02-24

## 2019-12-06 RX ORDER — PANTOPRAZOLE SODIUM 40 MG/1
TABLET, DELAYED RELEASE ORAL
Qty: 90 TABLET | Refills: 0 | Status: SHIPPED | OUTPATIENT
Start: 2019-12-06 | End: 2020-03-06

## 2019-12-06 RX ORDER — TRIAMTERENE AND HYDROCHLOROTHIAZIDE 37.5; 25 MG/1; MG/1
1 CAPSULE ORAL EVERY MORNING
Qty: 90 CAPSULE | Refills: 1 | Status: SHIPPED | OUTPATIENT
Start: 2019-12-06 | End: 2020-07-29

## 2019-12-06 NOTE — TELEPHONE ENCOUNTER
HYDRALAZINE HCL 25 MG Oral Tab , PANTOPRAZOLE SODIUM 40 MG Oral Tab EC, and TRIAMTERENE-HCTZ 37.5-25 MG Oral Cap    Last OV relevant to medication: 12-5-2019    Last refill date: 8/29/2019 # 270 tabs with 0 refills     When pt was asked to return for OV: 6

## 2019-12-11 ENCOUNTER — PATIENT MESSAGE (OUTPATIENT)
Dept: INTERNAL MEDICINE CLINIC | Facility: CLINIC | Age: 74
End: 2019-12-11

## 2019-12-11 DIAGNOSIS — R05.9 COUGH: Primary | ICD-10-CM

## 2019-12-11 NOTE — TELEPHONE ENCOUNTER
From: Jacob Gunter  To: Jennifer Erickson MD  Sent: 12/11/2019 3:24 PM CST  Subject: Referral Request    I saw the allergist today he suggest I see an ENT doctor. . Can you send me a referral for an ENT that uses [facility] so the allergist can see his finding

## 2019-12-28 ENCOUNTER — PATIENT MESSAGE (OUTPATIENT)
Dept: INTERNAL MEDICINE CLINIC | Facility: CLINIC | Age: 74
End: 2019-12-28

## 2019-12-30 ENCOUNTER — PATIENT MESSAGE (OUTPATIENT)
Dept: INTERNAL MEDICINE CLINIC | Facility: CLINIC | Age: 74
End: 2019-12-30

## 2019-12-30 RX ORDER — ALPRAZOLAM 0.5 MG/1
0.5 TABLET ORAL 3 TIMES DAILY PRN
Qty: 15 TABLET | Refills: 0 | Status: SHIPPED | OUTPATIENT
Start: 2019-12-30 | End: 2020-01-09

## 2019-12-30 NOTE — TELEPHONE ENCOUNTER
Please review pending rx and advise if ok per pt's request d/t flying. Last refill on 5/7/19 #15. Last OV on 10/31/19 (well visit) RTC 6 months.

## 2019-12-30 NOTE — TELEPHONE ENCOUNTER
From: Sushma Guevara  To: Dejan Celaya MD  Sent: 12/28/2019 10:27 AM CST  Subject: Prescription Question    I will be flying several times in the next couple of months to [place] for my son.  Can the doctor please call in another prescription for Alpra

## 2020-02-23 DIAGNOSIS — I10 ESSENTIAL HYPERTENSION, BENIGN: ICD-10-CM

## 2020-02-24 RX ORDER — HYDRALAZINE HYDROCHLORIDE 25 MG/1
TABLET, FILM COATED ORAL
Qty: 270 TABLET | Refills: 0 | Status: SHIPPED | OUTPATIENT
Start: 2020-02-24 | End: 2020-05-07

## 2020-02-24 RX ORDER — FAMOTIDINE 40 MG/1
40 TABLET, FILM COATED ORAL NIGHTLY PRN
Qty: 90 TABLET | Refills: 3 | OUTPATIENT
Start: 2020-02-24 | End: 2020-05-24

## 2020-02-25 NOTE — TELEPHONE ENCOUNTER
Passed protocol    Refused - famotidine (PEPCID) 40 MG Oral Tab - filled on 12/5/2019 with #90 and 3 refills.      Requesting HYDRALAZINE HCL 25 MG Oral Tab  LOV: 12/5/19  RTC: 6 months  Last Relevant Labs: 11/5/19  Filled: 12/6/19 #270 with 0 refills    No

## 2020-02-26 RX ORDER — FAMOTIDINE 40 MG/1
40 TABLET, FILM COATED ORAL NIGHTLY PRN
Qty: 90 TABLET | Refills: 0 | Status: SHIPPED | OUTPATIENT
Start: 2020-02-26 | End: 2020-02-28

## 2020-03-06 RX ORDER — PANTOPRAZOLE SODIUM 40 MG/1
TABLET, DELAYED RELEASE ORAL
Qty: 90 TABLET | Refills: 0 | Status: SHIPPED | OUTPATIENT
Start: 2020-03-06 | End: 2020-05-22

## 2020-03-06 NOTE — TELEPHONE ENCOUNTER
PANTOPRAZOLE SODIUM 40 MG     Last OV relevant to medication: 12/5/2019    Last refill date: 12-6-2019 # 90 tabs with 0 refills     When pt was asked to return for OV: 6 months     Upcoming appt/reason: none scheduled     Labs: 11-5-2019-lipid,a1c

## 2020-05-04 RX ORDER — TEMAZEPAM 15 MG/1
CAPSULE ORAL
Qty: 30 CAPSULE | Refills: 0 | Status: SHIPPED | OUTPATIENT
Start: 2020-05-04 | End: 2021-01-14

## 2020-05-04 NOTE — TELEPHONE ENCOUNTER
Temazepam 15 mg  Last OV relevant to medication: 10-31-19  Last refill date: 9-6-19 #/refills: 0  When pt was asked to return for OV: 6 mo.    Upcoming appt/reason: none  Recent labs: none

## 2020-05-07 DIAGNOSIS — I10 ESSENTIAL HYPERTENSION, BENIGN: ICD-10-CM

## 2020-05-07 RX ORDER — HYDRALAZINE HYDROCHLORIDE 25 MG/1
TABLET, FILM COATED ORAL
Qty: 270 TABLET | Refills: 0 | Status: SHIPPED | OUTPATIENT
Start: 2020-05-07 | End: 2020-07-29

## 2020-05-21 DIAGNOSIS — I10 ESSENTIAL HYPERTENSION, BENIGN: ICD-10-CM

## 2020-05-22 RX ORDER — AMLODIPINE BESYLATE 5 MG/1
TABLET ORAL
Qty: 180 TABLET | Refills: 2 | Status: SHIPPED | OUTPATIENT
Start: 2020-05-22 | End: 2020-11-30

## 2020-05-22 RX ORDER — PANTOPRAZOLE SODIUM 40 MG/1
TABLET, DELAYED RELEASE ORAL
Qty: 90 TABLET | Refills: 0 | Status: SHIPPED | OUTPATIENT
Start: 2020-05-22 | End: 2020-07-14

## 2020-05-22 NOTE — TELEPHONE ENCOUNTER
Pantoprazole 40mg last filled 3/6/20 #90    Amlodipine  5mg last filled 10/15/19    LOV 12/5/19     Last labs 11/5/19

## 2020-06-30 ENCOUNTER — PATIENT MESSAGE (OUTPATIENT)
Dept: INTERNAL MEDICINE CLINIC | Facility: CLINIC | Age: 75
End: 2020-06-30

## 2020-06-30 DIAGNOSIS — R07.0 THROAT DISCOMFORT: Primary | ICD-10-CM

## 2020-07-01 NOTE — TELEPHONE ENCOUNTER
From: Shilpa Liang  To:  Wander Sequeira MD  Sent: 6/30/2020 3:40 PM CDT  Subject: Referral Request    I still have a scratchy throat for the last couple of years and I'v seen several specialist. Can Dr. Mary Abdul give me referral to see a ENT doctor that us

## 2020-07-06 RX ORDER — FAMOTIDINE 40 MG/1
TABLET, FILM COATED ORAL
Qty: 90 TABLET | Refills: 0 | Status: SHIPPED | OUTPATIENT
Start: 2020-07-06 | End: 2020-09-29

## 2020-07-06 NOTE — TELEPHONE ENCOUNTER
Appointment needed - [employer] message sent to pt to contact office to schedule BP check    Requesting famotidine (PEPCID) 40 MG Oral Tab  LOV: 12/5/19  RTC: 6 months  Last Relevant Labs: 11/5/19  Filled: 2/28/20 #90 with 0 refills    No future appointments.

## 2020-07-14 RX ORDER — PANTOPRAZOLE SODIUM 40 MG/1
TABLET, DELAYED RELEASE ORAL
Qty: 90 TABLET | Refills: 0 | Status: SHIPPED | OUTPATIENT
Start: 2020-07-14 | End: 2020-09-29

## 2020-07-14 NOTE — TELEPHONE ENCOUNTER
Refill requested:   Requested Prescriptions     Pending Prescriptions Disp Refills   • Pantoprazole Sodium 40 MG Oral Tab EC [Pharmacy Med Name: PANTOPRAZOLE SOD 40MG EC TABLET] 90 tablet 0     Sig: TAKE 1 TABLET BY MOUTH IN  THE MORNING BEFORE  BREAKFAST

## 2020-07-27 ENCOUNTER — OFFICE VISIT (OUTPATIENT)
Dept: INTERNAL MEDICINE CLINIC | Facility: CLINIC | Age: 75
End: 2020-07-27
Payer: COMMERCIAL

## 2020-07-27 VITALS
SYSTOLIC BLOOD PRESSURE: 142 MMHG | HEIGHT: 63 IN | BODY MASS INDEX: 47.48 KG/M2 | TEMPERATURE: 98 F | OXYGEN SATURATION: 98 % | HEART RATE: 78 BPM | RESPIRATION RATE: 18 BRPM | DIASTOLIC BLOOD PRESSURE: 82 MMHG | WEIGHT: 268 LBS

## 2020-07-27 DIAGNOSIS — R05.9 COUGH: ICD-10-CM

## 2020-07-27 DIAGNOSIS — I10 ESSENTIAL HYPERTENSION, BENIGN: Primary | Chronic | ICD-10-CM

## 2020-07-27 PROCEDURE — 3008F BODY MASS INDEX DOCD: CPT | Performed by: INTERNAL MEDICINE

## 2020-07-27 PROCEDURE — 3077F SYST BP >= 140 MM HG: CPT | Performed by: INTERNAL MEDICINE

## 2020-07-27 PROCEDURE — 3079F DIAST BP 80-89 MM HG: CPT | Performed by: INTERNAL MEDICINE

## 2020-07-27 PROCEDURE — 99213 OFFICE O/P EST LOW 20 MIN: CPT | Performed by: INTERNAL MEDICINE

## 2020-07-27 NOTE — PROGRESS NOTES
Shilpa Liang [facility] 1945 is a 77year old female.     Patient presents with:  Hypertension  Referral: ENT        HPI:   Patient wants a follow-up ENT consult  Current Outpatient Medications   Medication Sig Dispense Refill   • Pantoprazole Sodium 40 MG Change in exercise tolerance no. Chest pain no. Chest pain while awake none. Cold extremities no. Dizziness no. Dyspnea on exertion none. Fainting none. Fatigue no. High blood pressure on medication(s). Irregular heart beat no. Leg edema no. Murmurs no.  Or

## 2020-07-29 DIAGNOSIS — I10 ESSENTIAL HYPERTENSION, BENIGN: ICD-10-CM

## 2020-07-29 RX ORDER — HYDRALAZINE HYDROCHLORIDE 25 MG/1
TABLET, FILM COATED ORAL
Qty: 270 TABLET | Refills: 1 | Status: SHIPPED | OUTPATIENT
Start: 2020-07-29 | End: 2020-12-28

## 2020-07-29 RX ORDER — TRIAMTERENE AND HYDROCHLOROTHIAZIDE 37.5; 25 MG/1; MG/1
1 CAPSULE ORAL EVERY MORNING
Qty: 90 CAPSULE | Refills: 1 | Status: SHIPPED | OUTPATIENT
Start: 2020-07-29 | End: 2020-08-25

## 2020-07-29 NOTE — TELEPHONE ENCOUNTER
Triamterene-HCTZ 37.5-25 mg  Last OV relevant to medication: 7-27-20  Last refill date: 12-6-19 #/refills: 1  When pt was asked to return for OV: 4 mo.   Upcoming appt/reason: none  Recent labs: 11-5-19: CMP    Hydralazine HCL 25 mg  Last OV relevant to med

## 2020-08-11 ENCOUNTER — PATIENT MESSAGE (OUTPATIENT)
Dept: INTERNAL MEDICINE CLINIC | Facility: CLINIC | Age: 75
End: 2020-08-11

## 2020-08-11 RX ORDER — CODEINE PHOSPHATE AND GUAIFENESIN 10; 100 MG/5ML; MG/5ML
5 SOLUTION ORAL EVERY 6 HOURS PRN
Qty: 240 ML | Refills: 0 | Status: SHIPPED | OUTPATIENT
Start: 2020-08-11 | End: 2020-08-21

## 2020-08-19 ENCOUNTER — PATIENT MESSAGE (OUTPATIENT)
Dept: INTERNAL MEDICINE CLINIC | Facility: CLINIC | Age: 75
End: 2020-08-19

## 2020-08-20 ENCOUNTER — PATIENT MESSAGE (OUTPATIENT)
Dept: INTERNAL MEDICINE CLINIC | Facility: CLINIC | Age: 75
End: 2020-08-20

## 2020-08-20 RX ORDER — LEVOCETIRIZINE DIHYDROCHLORIDE 5 MG/1
5 TABLET, FILM COATED ORAL EVERY EVENING
Qty: 90 TABLET | Refills: 0 | Status: SHIPPED | OUTPATIENT
Start: 2020-08-20 | End: 2020-08-25 | Stop reason: ALTCHOICE

## 2020-08-20 NOTE — TELEPHONE ENCOUNTER
From: Nan Tyson  To:  Amanuel Mendez MD  Sent: 8/20/2020 9:33 AM CDT  Subject: Prescription Question    Can you give me a precription for levocetirizine dihydrochloride 5 mg for post nasal drip and cough

## 2020-08-24 ENCOUNTER — APPOINTMENT (OUTPATIENT)
Dept: LAB | Age: 75
End: 2020-08-24
Attending: INTERNAL MEDICINE
Payer: MEDICARE

## 2020-08-24 LAB
ANION GAP SERPL CALC-SCNC: 5 MMOL/L (ref 0–18)
BUN BLD-MCNC: 16 MG/DL (ref 7–18)
BUN/CREAT SERPL: 15.2 (ref 10–20)
CALCIUM BLD-MCNC: 9.5 MG/DL (ref 8.5–10.1)
CHLORIDE SERPL-SCNC: 105 MMOL/L (ref 98–112)
CO2 SERPL-SCNC: 30 MMOL/L (ref 21–32)
CREAT BLD-MCNC: 1.05 MG/DL (ref 0.55–1.02)
GLUCOSE BLD-MCNC: 87 MG/DL (ref 70–99)
OSMOLALITY SERPL CALC.SUM OF ELEC: 291 MOSM/KG (ref 275–295)
PATIENT FASTING Y/N/NP: NO
POTASSIUM SERPL-SCNC: 3.8 MMOL/L (ref 3.5–5.1)
SODIUM SERPL-SCNC: 140 MMOL/L (ref 136–145)

## 2020-08-25 ENCOUNTER — OFFICE VISIT (OUTPATIENT)
Dept: INTERNAL MEDICINE CLINIC | Facility: CLINIC | Age: 75
End: 2020-08-25
Payer: COMMERCIAL

## 2020-08-25 VITALS
TEMPERATURE: 98 F | DIASTOLIC BLOOD PRESSURE: 80 MMHG | BODY MASS INDEX: 46.28 KG/M2 | OXYGEN SATURATION: 97 % | RESPIRATION RATE: 18 BRPM | HEIGHT: 63 IN | HEART RATE: 63 BPM | SYSTOLIC BLOOD PRESSURE: 132 MMHG | WEIGHT: 261.19 LBS

## 2020-08-25 DIAGNOSIS — L50.9 URTICARIA: Primary | ICD-10-CM

## 2020-08-25 DIAGNOSIS — I10 ESSENTIAL HYPERTENSION, BENIGN: ICD-10-CM

## 2020-08-25 DIAGNOSIS — Z00.00 LABORATORY EXAMINATION ORDERED AS PART OF A ROUTINE GENERAL MEDICAL EXAMINATION: ICD-10-CM

## 2020-08-25 PROCEDURE — 3008F BODY MASS INDEX DOCD: CPT | Performed by: INTERNAL MEDICINE

## 2020-08-25 PROCEDURE — 3079F DIAST BP 80-89 MM HG: CPT | Performed by: INTERNAL MEDICINE

## 2020-08-25 PROCEDURE — 99213 OFFICE O/P EST LOW 20 MIN: CPT | Performed by: INTERNAL MEDICINE

## 2020-08-25 PROCEDURE — 3075F SYST BP GE 130 - 139MM HG: CPT | Performed by: INTERNAL MEDICINE

## 2020-08-25 RX ORDER — LORATADINE 10 MG/1
10 TABLET ORAL DAILY
Qty: 30 TABLET | Refills: 11 | Status: SHIPPED | OUTPATIENT
Start: 2020-08-25 | End: 2020-09-09

## 2020-08-25 RX ORDER — TRIAMTERENE AND HYDROCHLOROTHIAZIDE 37.5; 25 MG/1; MG/1
1 CAPSULE ORAL EVERY MORNING
Qty: 90 CAPSULE | Refills: 1 | Status: SHIPPED | OUTPATIENT
Start: 2020-08-25 | End: 2020-11-30

## 2020-08-25 RX ORDER — METHYLPREDNISOLONE 4 MG/1
TABLET ORAL
Qty: 1 KIT | Refills: 0 | Status: SHIPPED | OUTPATIENT
Start: 2020-08-25 | End: 2020-10-22

## 2020-08-25 NOTE — PROGRESS NOTES
Raudel Rodriguez [facility] 1945 is a 77year old female.     Patient presents with:  Swelling      HPI:   General:   General    No prior history of eczema, asthma or hay fever   denies any unusual foods or drug ingestion  denies exposure to any unusual agents/ Unspecified essential hypertension    • Visual impairment     glasses for distance      Social History:  Social History    Tobacco Use      Smoking status: Never Smoker      Smokeless tobacco: Never Used    Alcohol use: Yes      Frequency: Monthly or less indicates understanding of these issues and agrees to the plan. The patient is asked to return in Return in about 1 week (around 9/1/2020).     Elieser Dietrich MD

## 2020-09-11 ENCOUNTER — TELEMEDICINE (OUTPATIENT)
Dept: INTERNAL MEDICINE CLINIC | Facility: CLINIC | Age: 75
End: 2020-09-11

## 2020-09-11 ENCOUNTER — APPOINTMENT (OUTPATIENT)
Dept: LAB | Age: 75
End: 2020-09-11
Attending: INTERNAL MEDICINE
Payer: MEDICARE

## 2020-09-11 DIAGNOSIS — R43.0 LOSS OF SMELL: ICD-10-CM

## 2020-09-11 DIAGNOSIS — R43.2 LOSS OF TASTE: ICD-10-CM

## 2020-09-11 DIAGNOSIS — M79.10 MUSCLE PAIN: ICD-10-CM

## 2020-09-11 DIAGNOSIS — R43.0 LOSS OF SMELL: Primary | ICD-10-CM

## 2020-09-11 PROCEDURE — 99213 OFFICE O/P EST LOW 20 MIN: CPT | Performed by: INTERNAL MEDICINE

## 2020-09-11 RX ORDER — CYCLOBENZAPRINE HCL 5 MG
5 TABLET ORAL 3 TIMES DAILY PRN
Qty: 30 TABLET | Refills: 0 | Status: SHIPPED | OUTPATIENT
Start: 2020-09-11 | End: 2020-09-21

## 2020-09-11 RX ORDER — NAPROXEN 500 MG/1
500 TABLET ORAL 2 TIMES DAILY WITH MEALS
Qty: 60 TABLET | Refills: 0 | Status: SHIPPED | OUTPATIENT
Start: 2020-09-11 | End: 2021-10-12

## 2020-09-11 NOTE — PROGRESS NOTES
Virtual Telephone Check-In    Tj Tiwari verbally consents to a Virtual/Telephone Check-In visit on 09/11/20. Patient has been referred to the [facility] website at [email or web address] to review the yearly Consent to Treat document.     Patient [place]

## 2020-09-14 LAB — SARS-COV-2 RNA RESP QL NAA+PROBE: NOT DETECTED

## 2020-09-29 RX ORDER — PANTOPRAZOLE SODIUM 40 MG/1
TABLET, DELAYED RELEASE ORAL
Qty: 90 TABLET | Refills: 0 | Status: SHIPPED | OUTPATIENT
Start: 2020-09-29 | End: 2020-11-30

## 2020-09-29 RX ORDER — FAMOTIDINE 40 MG/1
40 TABLET, FILM COATED ORAL NIGHTLY PRN
Qty: 90 TABLET | Refills: 0 | Status: SHIPPED | OUTPATIENT
Start: 2020-09-29 | End: 2020-11-22

## 2020-09-29 NOTE — TELEPHONE ENCOUNTER
Requesting FAMOTIDINE 40 MG Oral Tab  LOV: 8/25/20  RTC: 1 week  Last Relevant Labs: 8/24/20  Filled: 7/6/20 #90 with 0 refills      Requesting Pantoprazole Sodium 40 MG Oral Tab EC  Filled: 7/14/20 #90 with 0 refills    Future Appointments   Date Time

## 2020-10-02 RX ORDER — LEVOCETIRIZINE DIHYDROCHLORIDE 5 MG/1
5 TABLET, FILM COATED ORAL EVERY EVENING
Qty: 90 TABLET | Refills: 0 | OUTPATIENT
Start: 2020-10-02

## 2020-10-07 ENCOUNTER — PATIENT MESSAGE (OUTPATIENT)
Dept: INTERNAL MEDICINE CLINIC | Facility: CLINIC | Age: 75
End: 2020-10-07

## 2020-10-08 RX ORDER — LEVOCETIRIZINE DIHYDROCHLORIDE 5 MG/1
5 TABLET, FILM COATED ORAL EVERY EVENING
Qty: 90 TABLET | Refills: 0 | Status: SHIPPED | OUTPATIENT
Start: 2020-10-08 | End: 2020-11-19

## 2020-10-08 NOTE — TELEPHONE ENCOUNTER
Discussed pt's request regarding rx to be sent to mail order. Pt wanting to know why xyzal was changed to claritin. Please advise? Pt stated she would prefer to go back to xyzal as it works better (ok w/ being sent to [employer] [employer]). Please advise.  Mamie Rivas

## 2020-10-08 NOTE — TELEPHONE ENCOUNTER
I would not change the medicine from Xyzal to Claritin if she wants to go back to Xyzal this fine with me. It was probably sent to me as Claritin for refill.   Please go ahead and give her Xyzal

## 2020-10-09 ENCOUNTER — APPOINTMENT (OUTPATIENT)
Dept: LAB | Age: 75
End: 2020-10-09
Attending: INTERNAL MEDICINE
Payer: MEDICARE

## 2020-10-09 PROCEDURE — 83036 HEMOGLOBIN GLYCOSYLATED A1C: CPT | Performed by: INTERNAL MEDICINE

## 2020-10-09 PROCEDURE — 80061 LIPID PANEL: CPT | Performed by: INTERNAL MEDICINE

## 2020-10-09 PROCEDURE — 85025 COMPLETE CBC W/AUTO DIFF WBC: CPT | Performed by: INTERNAL MEDICINE

## 2020-10-09 PROCEDURE — 81001 URINALYSIS AUTO W/SCOPE: CPT | Performed by: INTERNAL MEDICINE

## 2020-10-09 PROCEDURE — 84443 ASSAY THYROID STIM HORMONE: CPT | Performed by: INTERNAL MEDICINE

## 2020-10-09 PROCEDURE — 80053 COMPREHEN METABOLIC PANEL: CPT | Performed by: INTERNAL MEDICINE

## 2020-10-09 PROCEDURE — 36415 COLL VENOUS BLD VENIPUNCTURE: CPT | Performed by: INTERNAL MEDICINE

## 2020-10-22 ENCOUNTER — OFFICE VISIT (OUTPATIENT)
Dept: INTERNAL MEDICINE CLINIC | Facility: CLINIC | Age: 75
End: 2020-10-22
Payer: COMMERCIAL

## 2020-10-22 ENCOUNTER — MA CHART PREP (OUTPATIENT)
Dept: FAMILY MEDICINE CLINIC | Facility: CLINIC | Age: 75
End: 2020-10-22

## 2020-10-22 VITALS
TEMPERATURE: 98 F | DIASTOLIC BLOOD PRESSURE: 82 MMHG | SYSTOLIC BLOOD PRESSURE: 124 MMHG | HEART RATE: 75 BPM | OXYGEN SATURATION: 97 % | RESPIRATION RATE: 16 BRPM | WEIGHT: 256.19 LBS | BODY MASS INDEX: 44.83 KG/M2 | HEIGHT: 63.5 IN

## 2020-10-22 DIAGNOSIS — E78.00 PURE HYPERCHOLESTEROLEMIA: Chronic | ICD-10-CM

## 2020-10-22 DIAGNOSIS — Z00.00 ROUTINE GENERAL MEDICAL EXAMINATION AT A HEALTH CARE FACILITY: Primary | ICD-10-CM

## 2020-10-22 DIAGNOSIS — G47.33 OBSTRUCTIVE SLEEP APNEA: Chronic | ICD-10-CM

## 2020-10-22 DIAGNOSIS — E66.01 MORBID OBESITY (HCC): Chronic | ICD-10-CM

## 2020-10-22 DIAGNOSIS — I10 ESSENTIAL HYPERTENSION, BENIGN: Chronic | ICD-10-CM

## 2020-10-22 PROBLEM — R05.9 COUGH: Status: RESOLVED | Noted: 2018-10-05 | Resolved: 2020-10-22

## 2020-10-22 PROBLEM — N18.31 STAGE 3A CHRONIC KIDNEY DISEASE (HCC): Status: ACTIVE | Noted: 2020-10-22

## 2020-10-22 PROBLEM — I77.1 TORTUOUS AORTA (HCC): Status: ACTIVE | Noted: 2020-10-22

## 2020-10-22 PROBLEM — I77.1 TORTUOUS AORTA: Status: ACTIVE | Noted: 2020-10-22

## 2020-10-22 PROCEDURE — 3008F BODY MASS INDEX DOCD: CPT | Performed by: INTERNAL MEDICINE

## 2020-10-22 PROCEDURE — 90662 IIV NO PRSV INCREASED AG IM: CPT | Performed by: INTERNAL MEDICINE

## 2020-10-22 PROCEDURE — G0008 ADMIN INFLUENZA VIRUS VAC: HCPCS | Performed by: INTERNAL MEDICINE

## 2020-10-22 PROCEDURE — 99397 PER PM REEVAL EST PAT 65+ YR: CPT | Performed by: INTERNAL MEDICINE

## 2020-10-22 PROCEDURE — G0439 PPPS, SUBSEQ VISIT: HCPCS | Performed by: INTERNAL MEDICINE

## 2020-10-22 PROCEDURE — 3074F SYST BP LT 130 MM HG: CPT | Performed by: INTERNAL MEDICINE

## 2020-10-22 PROCEDURE — 3079F DIAST BP 80-89 MM HG: CPT | Performed by: INTERNAL MEDICINE

## 2020-10-22 PROCEDURE — G0009 ADMIN PNEUMOCOCCAL VACCINE: HCPCS | Performed by: INTERNAL MEDICINE

## 2020-10-22 PROCEDURE — 93000 ELECTROCARDIOGRAM COMPLETE: CPT | Performed by: INTERNAL MEDICINE

## 2020-10-22 PROCEDURE — 90732 PPSV23 VACC 2 YRS+ SUBQ/IM: CPT | Performed by: INTERNAL MEDICINE

## 2020-10-22 PROCEDURE — 96160 PT-FOCUSED HLTH RISK ASSMT: CPT | Performed by: INTERNAL MEDICINE

## 2020-10-22 RX ORDER — FAMOTIDINE 20 MG/1
1 TABLET ORAL 2 TIMES DAILY
COMMUNITY
Start: 2020-10-02 | End: 2020-10-22

## 2020-10-22 NOTE — PROGRESS NOTES
REASON FOR VISIT:    Benoit Vicente is a 77year old female who presents for a Medicare Annual Wellness visit.     female     Patient Care Team: Patient Care Team:  Julián Mills MD as PCP - General (Internal Medicine)    Patient Active Problem List: 10/9/2020 8/24/2020 11/5/2019 10/8/2018 6/25/2018 4/3/2018 2/19/2018   BUN 7 - 18 mg/dL 25(H) 16 14 19 11 13 15   Creatinine 0.55 - 1.02 mg/dL 1.00 1.05(H) 0.90 1.06(H) 0.93 1.05(H) 0.93   Some recent data might be hidden     AST and ALT Latest Ref Rng & U last two weeks)?: Not at all  Feeling down, depressed, or hopeless (over the last two weeks)?: Not at all  PHQ-2 SCORE: 0        Advance Directives     Do you have a healthcare power of ?: Yes  Do you have a living will?: Yes     Cognitive Assessme sleep apnea)    • Osteoarthritis    • Osteoarthritis of both knees 4/8/2014   • Pes planus (flat feet)    • Severe obesity (BMI >= 40) ([facility]) 4/10/2017   • Sleep apnea    • Unspecified essential hypertension    • Visual impairment     glasses for distance loss or gain . HEENT/Neck:   Head no headache, no dizziness, no lightheadedness. Eyes normal vision, no redness, no drainage. Ears no earaches, no fullness, normal hearing, no tinnitus.  Nose and Sinuses no recurrent colds, no discharge, no itching, no ha occ   Does  Not use a ASHWINI mask cos of cough     EXAM:   /82   Pulse 75   Temp 98.4 °F (36.9 °C) (Oral)   Resp 16   Ht 63.5\"   Wt 256 lb 3.2 oz (116.2 kg)   SpO2 97%   BMI 44.67 kg/m²    > BP Readings from Last 3 Encounters:  10/22/20 : 124/82  08/25 -normal/co-ordination normal/wasting -none/involuntary movements -none. Reflexes: normal.   Sensory: normal sensation to all modalities. LYMPHATICS:   Groin: no adenopathy . Inguinal: no adenopathy. Supraclavicular: none.    DERMATOLOGY:   Rash: no.

## 2020-11-12 ENCOUNTER — HOSPITAL ENCOUNTER (OUTPATIENT)
Dept: MAMMOGRAPHY | Age: 75
Discharge: HOME | End: 2020-11-12
Attending: INTERNAL MEDICINE
Payer: MEDICARE

## 2020-11-12 DIAGNOSIS — Z00.00 ROUTINE GENERAL MEDICAL EXAMINATION AT A HEALTH CARE FACILITY: ICD-10-CM

## 2020-11-12 PROCEDURE — 77067 SCR MAMMO BI INCL CAD: CPT | Performed by: INTERNAL MEDICINE

## 2020-11-12 PROCEDURE — 77063 BREAST TOMOSYNTHESIS BI: CPT | Performed by: INTERNAL MEDICINE

## 2020-11-18 NOTE — TELEPHONE ENCOUNTER
Pt states pharmacy never received rx and asking for it to be sent to local pharmacy. Pended rx to mail order and local pharmacy.

## 2020-11-19 RX ORDER — LEVOCETIRIZINE DIHYDROCHLORIDE 5 MG/1
5 TABLET, FILM COATED ORAL EVERY EVENING
Qty: 90 TABLET | Refills: 0 | Status: SHIPPED | OUTPATIENT
Start: 2020-11-19 | End: 2021-02-16 | Stop reason: CLARIF

## 2020-11-19 RX ORDER — LEVOCETIRIZINE DIHYDROCHLORIDE 5 MG/1
5 TABLET, FILM COATED ORAL EVERY EVENING
Qty: 30 TABLET | Refills: 0 | Status: SHIPPED | OUTPATIENT
Start: 2020-11-19 | End: 2021-02-16

## 2020-11-23 RX ORDER — FAMOTIDINE 40 MG/1
40 TABLET, FILM COATED ORAL NIGHTLY PRN
Qty: 90 TABLET | Refills: 0 | Status: SHIPPED | OUTPATIENT
Start: 2020-11-23 | End: 2020-12-28

## 2020-11-23 NOTE — TELEPHONE ENCOUNTER
Passed protocol    Requesting famotidine 40 MG Oral Tab  LOV: 10/22/20  RTC: 6 months  Last Relevant Labs: 10/9/20  Filled: 9/29/20 #90 with 0 refills    No future appointments.

## 2020-11-29 DIAGNOSIS — I10 ESSENTIAL HYPERTENSION, BENIGN: ICD-10-CM

## 2020-11-30 RX ORDER — PANTOPRAZOLE SODIUM 40 MG/1
TABLET, DELAYED RELEASE ORAL
Qty: 90 TABLET | Refills: 3 | Status: SHIPPED | OUTPATIENT
Start: 2020-11-30

## 2020-11-30 RX ORDER — AMLODIPINE BESYLATE 5 MG/1
TABLET ORAL
Qty: 180 TABLET | Refills: 3 | Status: SHIPPED | OUTPATIENT
Start: 2020-11-30 | End: 2021-11-26

## 2020-11-30 RX ORDER — TRIAMTERENE AND HYDROCHLOROTHIAZIDE 37.5; 25 MG/1; MG/1
CAPSULE ORAL
Qty: 90 CAPSULE | Refills: 3 | Status: SHIPPED | OUTPATIENT
Start: 2020-11-30 | End: 2021-11-15

## 2020-11-30 NOTE — TELEPHONE ENCOUNTER
Medication(s) to Refill:   Requested Prescriptions     Pending Prescriptions Disp Refills   • TRIAMTERENE-HCTZ 37.5-25 MG Oral Cap [Pharmacy Med Name: TRIAMT-HCTZ 37.5-25MG CAPSULE] 90 capsule 3     Sig: TAKE 1 CAPSULE BY MOUTH IN  THE MORNING   • amLODIPi

## 2020-11-30 NOTE — TELEPHONE ENCOUNTER
Failed protocol     Last refill:  9/29/2020 Pantoprazole 40 mg #90 NR    LOV:   10/22/2020 Dr Levi Shine RTC 6 months  No FOV scheduled

## 2020-12-15 ENCOUNTER — PATIENT MESSAGE (OUTPATIENT)
Dept: INTERNAL MEDICINE CLINIC | Facility: CLINIC | Age: 75
End: 2020-12-15

## 2020-12-16 NOTE — TELEPHONE ENCOUNTER
There is no specific med to bring the taste back unless you know the cause  She should see dr Dustin Quiroz for further eval

## 2020-12-26 DIAGNOSIS — I10 ESSENTIAL HYPERTENSION, BENIGN: ICD-10-CM

## 2020-12-28 RX ORDER — HYDRALAZINE HYDROCHLORIDE 25 MG/1
TABLET, FILM COATED ORAL
Qty: 270 TABLET | Refills: 3 | Status: SHIPPED | OUTPATIENT
Start: 2020-12-28 | End: 2021-12-21

## 2020-12-28 RX ORDER — FAMOTIDINE 40 MG/1
TABLET, FILM COATED ORAL
Qty: 90 TABLET | Refills: 3 | Status: SHIPPED | OUTPATIENT
Start: 2020-12-28 | End: 2021-12-30

## 2020-12-28 NOTE — TELEPHONE ENCOUNTER
Protocol passed   Medication(s) to Refill:   Requested Prescriptions     Pending Prescriptions Disp Refills   • HYDRALAZINE HCL 25 MG Oral Tab [Pharmacy Med Name: HYDRALAZINE  25MG  TAB] 270 tablet 3     Sig: TAKE 1 TABLET BY MOUTH 3  TIMES DAILY   • FLACO

## 2021-01-14 NOTE — TELEPHONE ENCOUNTER
Requesting TEMAZEPAM 15 MG Oral Cap  LOV: 10/22/20  RTC: 6 months  Last Relevant Labs: 10/9/20  Filled: 5/4/20 #30 with 0 refills    No future appointments.

## 2021-01-15 RX ORDER — TEMAZEPAM 15 MG/1
15 CAPSULE ORAL NIGHTLY PRN
Qty: 30 CAPSULE | Refills: 0 | Status: SHIPPED | OUTPATIENT
Start: 2021-01-15 | End: 2021-10-18 | Stop reason: ALTCHOICE

## 2021-02-16 RX ORDER — LEVOCETIRIZINE DIHYDROCHLORIDE 5 MG/1
5 TABLET, FILM COATED ORAL EVERY EVENING
Qty: 90 TABLET | Refills: 0 | Status: SHIPPED | OUTPATIENT
Start: 2021-02-16 | End: 2021-03-22

## 2021-03-03 DIAGNOSIS — Z23 NEED FOR VACCINATION: ICD-10-CM

## 2021-03-22 RX ORDER — LEVOCETIRIZINE DIHYDROCHLORIDE 5 MG/1
TABLET, FILM COATED ORAL
Qty: 30 TABLET | Refills: 0 | Status: SHIPPED | OUTPATIENT
Start: 2021-03-22 | End: 2021-06-18

## 2021-03-22 NOTE — TELEPHONE ENCOUNTER
Protocol passed   Medication(s) to Refill:   Requested Prescriptions     Pending Prescriptions Disp Refills   • LEVOCETIRIZINE DIHYDROCHLORIDE 5 MG Oral Tab [Pharmacy Med Name: LEVOCETIRIZINE 5MG TABLETS] 30 tablet 0     Sig: TAKE 1 TABLET(5 MG) BY MOUTH E

## 2021-03-24 ENCOUNTER — LAB ENCOUNTER (OUTPATIENT)
Dept: LAB | Age: 76
End: 2021-03-24
Attending: INTERNAL MEDICINE
Payer: MEDICARE

## 2021-03-24 LAB
ANION GAP SERPL CALC-SCNC: 5 MMOL/L (ref 0–18)
BUN BLD-MCNC: 19 MG/DL (ref 7–18)
BUN/CREAT SERPL: 22.1 (ref 10–20)
CALCIUM BLD-MCNC: 9.2 MG/DL (ref 8.5–10.1)
CHLORIDE SERPL-SCNC: 108 MMOL/L (ref 98–112)
CHOLEST SMN-MCNC: 195 MG/DL (ref ?–200)
CO2 SERPL-SCNC: 28 MMOL/L (ref 21–32)
CREAT BLD-MCNC: 0.86 MG/DL
EST. AVERAGE GLUCOSE BLD GHB EST-MCNC: 120 MG/DL (ref 68–126)
GLUCOSE BLD-MCNC: 90 MG/DL (ref 70–99)
HBA1C MFR BLD HPLC: 5.8 % (ref ?–5.7)
HDLC SERPL-MCNC: 70 MG/DL (ref 40–59)
LDLC SERPL CALC-MCNC: 111 MG/DL (ref ?–100)
NONHDLC SERPL-MCNC: 125 MG/DL (ref ?–130)
OSMOLALITY SERPL CALC.SUM OF ELEC: 294 MOSM/KG (ref 275–295)
PATIENT FASTING Y/N/NP: YES
PATIENT FASTING Y/N/NP: YES
POTASSIUM SERPL-SCNC: 3.9 MMOL/L (ref 3.5–5.1)
SODIUM SERPL-SCNC: 141 MMOL/L (ref 136–145)
TRIGL SERPL-MCNC: 68 MG/DL (ref 30–149)
VLDLC SERPL CALC-MCNC: 14 MG/DL (ref 0–30)

## 2021-03-24 PROCEDURE — 83036 HEMOGLOBIN GLYCOSYLATED A1C: CPT | Performed by: INTERNAL MEDICINE

## 2021-03-24 PROCEDURE — 80061 LIPID PANEL: CPT | Performed by: INTERNAL MEDICINE

## 2021-03-24 PROCEDURE — 80048 BASIC METABOLIC PNL TOTAL CA: CPT | Performed by: INTERNAL MEDICINE

## 2021-03-24 PROCEDURE — 36415 COLL VENOUS BLD VENIPUNCTURE: CPT | Performed by: INTERNAL MEDICINE

## 2021-06-08 ENCOUNTER — TELEPHONE (OUTPATIENT)
Dept: CASE MANAGEMENT | Age: 76
End: 2021-06-08

## 2021-06-08 NOTE — TELEPHONE ENCOUNTER
Pt informed is eligible for 2021 Medicare Advantage Supervisit, states is out of town and will call back to schedule.

## 2021-06-18 RX ORDER — LEVOCETIRIZINE DIHYDROCHLORIDE 5 MG/1
TABLET, FILM COATED ORAL
Qty: 90 TABLET | Refills: 0 | Status: SHIPPED | OUTPATIENT
Start: 2021-06-18 | End: 2021-09-25

## 2021-06-18 NOTE — TELEPHONE ENCOUNTER
Passed protocol      Requesting LEVOCETIRIZINE DIHYDROCHLORIDE 5 MG Oral Tab  LOV: 10/22/20  RTC: 6 months  Last Relevant Labs: 3/24/21  Filled: 3/22/21 #30 with 0 refills    No future appointments.

## 2021-08-16 ENCOUNTER — OFFICE VISIT (OUTPATIENT)
Dept: INTERNAL MEDICINE CLINIC | Facility: CLINIC | Age: 76
End: 2021-08-16
Payer: COMMERCIAL

## 2021-08-16 VITALS
WEIGHT: 268 LBS | RESPIRATION RATE: 18 BRPM | TEMPERATURE: 99 F | SYSTOLIC BLOOD PRESSURE: 120 MMHG | OXYGEN SATURATION: 99 % | DIASTOLIC BLOOD PRESSURE: 80 MMHG | HEART RATE: 92 BPM | HEIGHT: 63.5 IN | BODY MASS INDEX: 46.9 KG/M2

## 2021-08-16 DIAGNOSIS — M54.12 CERVICAL RADICULOPATHY: Primary | ICD-10-CM

## 2021-08-16 PROCEDURE — 99214 OFFICE O/P EST MOD 30 MIN: CPT | Performed by: INTERNAL MEDICINE

## 2021-08-16 PROCEDURE — 3074F SYST BP LT 130 MM HG: CPT | Performed by: INTERNAL MEDICINE

## 2021-08-16 PROCEDURE — 3079F DIAST BP 80-89 MM HG: CPT | Performed by: INTERNAL MEDICINE

## 2021-08-16 PROCEDURE — 3008F BODY MASS INDEX DOCD: CPT | Performed by: INTERNAL MEDICINE

## 2021-08-16 RX ORDER — BACLOFEN 10 MG/1
10 TABLET ORAL NIGHTLY
Qty: 15 TABLET | Refills: 0 | Status: SHIPPED | OUTPATIENT
Start: 2021-08-16 | End: 2021-12-30

## 2021-08-16 RX ORDER — METHYLPREDNISOLONE 4 MG/1
TABLET ORAL
Qty: 21 EACH | Refills: 0 | Status: SHIPPED | OUTPATIENT
Start: 2021-08-16 | End: 2021-08-16 | Stop reason: ALTCHOICE

## 2021-08-16 RX ORDER — LORATADINE 10 MG/1
TABLET ORAL
COMMUNITY
Start: 2021-06-17 | End: 2021-10-18

## 2021-08-16 RX ORDER — HYDROCODONE BITARTRATE AND ACETAMINOPHEN 5; 325 MG/1; MG/1
1 TABLET ORAL NIGHTLY PRN
Qty: 15 TABLET | Refills: 0 | Status: SHIPPED | OUTPATIENT
Start: 2021-08-16 | End: 2021-10-18 | Stop reason: ALTCHOICE

## 2021-08-16 RX ORDER — METHYLPREDNISOLONE 4 MG/1
TABLET ORAL
Qty: 21 EACH | Refills: 0 | Status: SHIPPED | OUTPATIENT
Start: 2021-08-16 | End: 2021-09-11 | Stop reason: ALTCHOICE

## 2021-08-16 NOTE — PROGRESS NOTES
Kat Houser  1945  is a 77year old female.     Patient presents with:  Arm Pain      HPI:     Neck:   c/o Neck pain right base of neck started about 5 to 7 days ago pain shooting down right upper extremity with some tingling involving the middle • Osteoarthritis    • Osteoarthritis of both knees 4/8/2014   • Pes planus (flat feet)    • Severe obesity (BMI >= 40) ([facility]) 4/10/2017   • Sleep apnea    • Unspecified essential hypertension    • Visual impairment     glasses for distance      Social His weeks (around 8/30/2021).       Neck exercises reviewed with pt -sheet given  Abdoul Drake MD

## 2021-08-18 ENCOUNTER — HOSPITAL ENCOUNTER (OUTPATIENT)
Dept: GENERAL RADIOLOGY | Age: 76
Discharge: HOME | End: 2021-08-18
Attending: INTERNAL MEDICINE
Payer: MEDICARE

## 2021-08-18 DIAGNOSIS — M54.12 CERVICAL RADICULOPATHY: ICD-10-CM

## 2021-08-18 PROCEDURE — 72050 X-RAY EXAM NECK SPINE 4/5VWS: CPT | Performed by: INTERNAL MEDICINE

## 2021-08-23 ENCOUNTER — PATIENT MESSAGE (OUTPATIENT)
Dept: INTERNAL MEDICINE CLINIC | Facility: CLINIC | Age: 76
End: 2021-08-23

## 2021-08-23 DIAGNOSIS — M54.12 CERVICAL RADICULOPATHY: Primary | ICD-10-CM

## 2021-08-24 NOTE — TELEPHONE ENCOUNTER
From: Bernardo Gaona  To:  Marli Mcdermott MD  Sent: 8/23/2021 6:47 PM CDT  Subject: Non-Urgent Medical Question    The pain in my neck and arm is not as bad but I still have pain plus the tingling and needle like feeling in my arm and hand it's worst when I

## 2021-09-04 ENCOUNTER — HOSPITAL ENCOUNTER (OUTPATIENT)
Dept: MRI IMAGING | Age: 76
Discharge: HOME | End: 2021-09-04
Attending: INTERNAL MEDICINE
Payer: MEDICARE

## 2021-09-04 DIAGNOSIS — M54.12 CERVICAL RADICULOPATHY: ICD-10-CM

## 2021-09-04 PROCEDURE — 72141 MRI NECK SPINE W/O DYE: CPT | Performed by: INTERNAL MEDICINE

## 2021-09-11 ENCOUNTER — OFFICE VISIT (OUTPATIENT)
Dept: INTERNAL MEDICINE CLINIC | Facility: CLINIC | Age: 76
End: 2021-09-11
Payer: COMMERCIAL

## 2021-09-11 VITALS
RESPIRATION RATE: 16 BRPM | OXYGEN SATURATION: 99 % | DIASTOLIC BLOOD PRESSURE: 72 MMHG | SYSTOLIC BLOOD PRESSURE: 124 MMHG | BODY MASS INDEX: 46.41 KG/M2 | WEIGHT: 265.19 LBS | TEMPERATURE: 98 F | HEIGHT: 63.5 IN | HEART RATE: 80 BPM

## 2021-09-11 DIAGNOSIS — M54.2 NECK PAIN: ICD-10-CM

## 2021-09-11 DIAGNOSIS — M48.02 CERVICAL STENOSIS OF SPINE: Primary | ICD-10-CM

## 2021-09-11 PROCEDURE — 99213 OFFICE O/P EST LOW 20 MIN: CPT | Performed by: INTERNAL MEDICINE

## 2021-09-11 PROCEDURE — 3008F BODY MASS INDEX DOCD: CPT | Performed by: INTERNAL MEDICINE

## 2021-09-11 PROCEDURE — 3078F DIAST BP <80 MM HG: CPT | Performed by: INTERNAL MEDICINE

## 2021-09-11 PROCEDURE — 3074F SYST BP LT 130 MM HG: CPT | Performed by: INTERNAL MEDICINE

## 2021-09-11 NOTE — PROGRESS NOTES
Mabel Hanna  1945 is a 77year old female. Patient presents with: Follow - Up      HPI:   Here for MRI discussion. Patient still has some soreness in the right base of the neck and right upper extremity a lot better than earlier.   Current O hypertension    • Visual impairment     glasses for distance      Social History:  Social History    Tobacco Use      Smoking status: Never Smoker      Smokeless tobacco: Never Used    Vaping Use      Vaping Use: Never used    Alcohol use: Yes      Comment

## 2021-09-15 ENCOUNTER — OFFICE VISIT (OUTPATIENT)
Dept: SURGERY | Facility: CLINIC | Age: 76
End: 2021-09-15
Payer: COMMERCIAL

## 2021-09-15 VITALS
WEIGHT: 265 LBS | HEIGHT: 63 IN | BODY MASS INDEX: 46.95 KG/M2 | DIASTOLIC BLOOD PRESSURE: 70 MMHG | SYSTOLIC BLOOD PRESSURE: 118 MMHG

## 2021-09-15 DIAGNOSIS — M48.02 CERVICAL SPINAL STENOSIS: ICD-10-CM

## 2021-09-15 DIAGNOSIS — M54.2 NECK PAIN: Primary | ICD-10-CM

## 2021-09-15 DIAGNOSIS — M48.02 FORAMINAL STENOSIS OF CERVICAL REGION: ICD-10-CM

## 2021-09-15 DIAGNOSIS — M25.78 CERVICAL OSTEOPHYTE: ICD-10-CM

## 2021-09-15 DIAGNOSIS — R20.2 TINGLING SENSATION: ICD-10-CM

## 2021-09-15 DIAGNOSIS — M40.202 KYPHOSIS OF CERVICAL REGION, UNSPECIFIED KYPHOSIS TYPE: ICD-10-CM

## 2021-09-15 PROCEDURE — 3008F BODY MASS INDEX DOCD: CPT | Performed by: PHYSICIAN ASSISTANT

## 2021-09-15 PROCEDURE — 3078F DIAST BP <80 MM HG: CPT | Performed by: PHYSICIAN ASSISTANT

## 2021-09-15 PROCEDURE — 3074F SYST BP LT 130 MM HG: CPT | Performed by: PHYSICIAN ASSISTANT

## 2021-09-15 PROCEDURE — 99204 OFFICE O/P NEW MOD 45 MIN: CPT | Performed by: PHYSICIAN ASSISTANT

## 2021-09-15 NOTE — PROGRESS NOTES
Ongoing for about a month  First week thought she had slept bad  But then she started getting tingling, needle like feeling  Mostly feeling this in the hand a little bit in the arm  Used to have pain in the arm but that has stopped    Is having some pain i

## 2021-09-15 NOTE — H&P
Patient: Johnnie Jovel  Medical Record Number: [id number]  YOB: 1945  PCP: Vania Langley MD    Referring Physician: Vania Langley  Reason for visit: Patient presents with:  New Patient: neck pain, right arm pain      Dear Dr. Vania Langley apnea    • Unspecified essential hypertension    • Visual impairment     glasses for distance      Past Surgical History:   Procedure Laterality Date   • CHOLECYSTECTOMY  2002   • COLONOSCOPY N/A 9/29/2017    Procedure: COLONOSCOPY;  Surgeon: Babs Gold MOUTH  TWICE DAILY 180 tablet 3   • PANTOPRAZOLE SODIUM 40 MG Oral Tab EC TAKE 1 TABLET BY MOUTH IN  THE MORNING BEFORE  BREAKFAST 90 tablet 3   • aspirin 81 MG Oral Tab Take 81 mg by mouth daily.      • Melatonin 10 MG Oral Cap Take 1 tablet by mouth as ne CERVICAL SPINE (4VIEWS) (CPT=72050), 8/18/2021, 9:07 AM.       INDICATIONS:  I15.76 Cervical radiculopathy       TECHNIQUE:  Multiplanar T1 and T2 weighted images including fat suppression sequences.  Images acquired in sagittal and axial planes.         P central canal stenosis.  Details above.  Bilateral neural foraminal stenosis.       Marked central canal stenosis at C5-C6 secondary to focal disc herniation with dural effacement and ventral cord indentation with bilateral neural foraminal stenosis.      Degenerative disc disease most prominent at C4-5 and C5-6 and C6-7.   Significant central osteophyte noted extending from C5-6 behind the body of C6 to C6–7.   - Ordered today:    - XR cervical flex/ext:     - Evaluate for subluxation    - CT cervical: discussing pain services and injections  More than 50% spent coordinating care, providing patient education, reviewing imaging, discussing conservative versus surgical treatment options and counseling.     Thank you very much for the kind referral.  Respect

## 2021-09-24 ENCOUNTER — TELEPHONE (OUTPATIENT)
Dept: PHYSICAL THERAPY | Facility: HOSPITAL | Age: 76
End: 2021-09-24

## 2021-09-25 RX ORDER — LEVOCETIRIZINE DIHYDROCHLORIDE 5 MG/1
TABLET, FILM COATED ORAL
Qty: 90 TABLET | Refills: 0 | Status: SHIPPED | OUTPATIENT
Start: 2021-09-25 | End: 2022-01-06

## 2021-09-27 ENCOUNTER — OFFICE VISIT (OUTPATIENT)
Dept: PHYSICAL THERAPY | Age: 76
End: 2021-09-27
Attending: PHYSICIAN ASSISTANT
Payer: MEDICARE

## 2021-09-27 PROCEDURE — 97162 PT EVAL MOD COMPLEX 30 MIN: CPT

## 2021-09-27 PROCEDURE — 97110 THERAPEUTIC EXERCISES: CPT

## 2021-09-27 NOTE — PROGRESS NOTES
SPINE EVALUATION:   Referring Physician: Dr. Marcus Schultz  Diagnosis: Cervical stenosis, DDD/DJD cervical spine     Date of Service: 9/27/2021     PATIENT SUMMARY   Raudel Rodriguez is a 77year old female who presents to therapy today with complaints of mild p with prolonged sitting, working on the computer, reaching with R arm and difficulty getting to sleep at a night. Signs and symptoms are consistent with diagnosis of cervical spine stenosis, DJD/DD with radiculopathy.  Pt and PT discussed evaluation finding instructed in and issued a HEP for: Seated cervical spine rotation, side flexion R/L x 5, nerve sliders R UE x 3, posture and body mechanics correction    Charges: PT Eval Moderate Complexity, EX 1      Total Timed Treatment: 15 min     Total Treatment Flordia Spotted

## 2021-10-01 ENCOUNTER — OFFICE VISIT (OUTPATIENT)
Dept: PHYSICAL THERAPY | Age: 76
End: 2021-10-01
Attending: PHYSICIAN ASSISTANT
Payer: MEDICARE

## 2021-10-01 PROCEDURE — 97140 MANUAL THERAPY 1/> REGIONS: CPT

## 2021-10-01 PROCEDURE — 97110 THERAPEUTIC EXERCISES: CPT

## 2021-10-01 NOTE — PROGRESS NOTES
Diagnosis: Cervical spine stenosis, DDD/DJD    Precautions: HTN, ASHWINI, Obesity,   Insurance Type (# Auth): [facility] (8) Total Timed Treatment: 45 min  Date POC Expires: 12/26/2001    Total Treatment time: 45 min       Charges: MT 1, EX 2    Treatment Number: 2 cervical spine position, tendency to forward head position. Assessment: Pt had intermittent tingling in the R hand during todays therapy session, tingling is decreased with correct posture and gentle cervical distraction.  Handout and red t band issued f

## 2021-10-04 ENCOUNTER — HOSPITAL ENCOUNTER (OUTPATIENT)
Dept: GENERAL RADIOLOGY | Age: 76
Discharge: HOME | End: 2021-10-04
Attending: PHYSICIAN ASSISTANT
Payer: MEDICARE

## 2021-10-04 ENCOUNTER — HOSPITAL ENCOUNTER (OUTPATIENT)
Dept: CT IMAGING | Age: 76
Discharge: HOME | End: 2021-10-04
Attending: PHYSICIAN ASSISTANT
Payer: MEDICARE

## 2021-10-04 DIAGNOSIS — M25.78 CERVICAL OSTEOPHYTE: ICD-10-CM

## 2021-10-04 DIAGNOSIS — M40.202 KYPHOSIS OF CERVICAL REGION, UNSPECIFIED KYPHOSIS TYPE: ICD-10-CM

## 2021-10-04 DIAGNOSIS — M48.02 CERVICAL SPINAL STENOSIS: ICD-10-CM

## 2021-10-04 DIAGNOSIS — M54.2 NECK PAIN: ICD-10-CM

## 2021-10-04 DIAGNOSIS — M48.02 FORAMINAL STENOSIS OF CERVICAL REGION: ICD-10-CM

## 2021-10-04 DIAGNOSIS — R20.2 TINGLING SENSATION: ICD-10-CM

## 2021-10-04 PROCEDURE — 72050 X-RAY EXAM NECK SPINE 4/5VWS: CPT | Performed by: PHYSICIAN ASSISTANT

## 2021-10-04 PROCEDURE — 72125 CT NECK SPINE W/O DYE: CPT | Performed by: PHYSICIAN ASSISTANT

## 2021-10-11 ENCOUNTER — PATIENT MESSAGE (OUTPATIENT)
Dept: INTERNAL MEDICINE CLINIC | Facility: CLINIC | Age: 76
End: 2021-10-11

## 2021-10-11 DIAGNOSIS — M79.10 MUSCLE PAIN: ICD-10-CM

## 2021-10-12 RX ORDER — NAPROXEN 500 MG/1
500 TABLET ORAL 2 TIMES DAILY WITH MEALS
Qty: 60 TABLET | Refills: 0 | Status: SHIPPED | OUTPATIENT
Start: 2021-10-12 | End: 2021-11-11

## 2021-10-12 RX ORDER — NAPROXEN 500 MG/1
TABLET ORAL
Qty: 60 TABLET | Refills: 0 | OUTPATIENT
Start: 2021-10-12

## 2021-10-12 NOTE — TELEPHONE ENCOUNTER
From: Veronica Arrieta  To: Hector Gasca MD  Sent: 10/11/2021 9:13 PM CDT  Subject: Refill    Can you please call [facility] with.  Refill of Napoxen I’m still having pain in my neck They only filled 15 on the last prescription

## 2021-10-12 NOTE — TELEPHONE ENCOUNTER
No Protocol     Requesting: naproxen 500mg     LOV: 9/11/21   RTC: 6 months   Filled: 9/11/20 #60 0 refills   Recent Labs: 3/24/21     Upcoming OV: 10/18/21

## 2021-10-15 ENCOUNTER — APPOINTMENT (OUTPATIENT)
Dept: PHYSICAL THERAPY | Age: 76
End: 2021-10-15
Attending: PHYSICIAN ASSISTANT
Payer: MEDICARE

## 2021-10-18 ENCOUNTER — OFFICE VISIT (OUTPATIENT)
Dept: INTERNAL MEDICINE CLINIC | Facility: CLINIC | Age: 76
End: 2021-10-18
Payer: COMMERCIAL

## 2021-10-18 VITALS
SYSTOLIC BLOOD PRESSURE: 126 MMHG | HEIGHT: 63 IN | BODY MASS INDEX: 47.52 KG/M2 | TEMPERATURE: 98 F | WEIGHT: 268.19 LBS | OXYGEN SATURATION: 98 % | HEART RATE: 63 BPM | DIASTOLIC BLOOD PRESSURE: 74 MMHG | RESPIRATION RATE: 16 BRPM

## 2021-10-18 DIAGNOSIS — E66.01 MORBID OBESITY (HCC): Chronic | ICD-10-CM

## 2021-10-18 DIAGNOSIS — I10 ESSENTIAL HYPERTENSION, BENIGN: Chronic | ICD-10-CM

## 2021-10-18 DIAGNOSIS — G47.33 OBSTRUCTIVE SLEEP APNEA: Chronic | ICD-10-CM

## 2021-10-18 DIAGNOSIS — E78.00 PURE HYPERCHOLESTEROLEMIA: Chronic | ICD-10-CM

## 2021-10-18 DIAGNOSIS — Z00.00 ROUTINE GENERAL MEDICAL EXAMINATION AT A HEALTH CARE FACILITY: Primary | ICD-10-CM

## 2021-10-18 PROCEDURE — 3008F BODY MASS INDEX DOCD: CPT | Performed by: INTERNAL MEDICINE

## 2021-10-18 PROCEDURE — 99397 PER PM REEVAL EST PAT 65+ YR: CPT | Performed by: INTERNAL MEDICINE

## 2021-10-18 PROCEDURE — G0439 PPPS, SUBSEQ VISIT: HCPCS | Performed by: INTERNAL MEDICINE

## 2021-10-18 PROCEDURE — 96160 PT-FOCUSED HLTH RISK ASSMT: CPT | Performed by: INTERNAL MEDICINE

## 2021-10-18 PROCEDURE — G0008 ADMIN INFLUENZA VIRUS VAC: HCPCS | Performed by: INTERNAL MEDICINE

## 2021-10-18 PROCEDURE — 90662 IIV NO PRSV INCREASED AG IM: CPT | Performed by: INTERNAL MEDICINE

## 2021-10-18 PROCEDURE — 3074F SYST BP LT 130 MM HG: CPT | Performed by: INTERNAL MEDICINE

## 2021-10-18 PROCEDURE — 3078F DIAST BP <80 MM HG: CPT | Performed by: INTERNAL MEDICINE

## 2021-10-18 PROCEDURE — 93000 ELECTROCARDIOGRAM COMPLETE: CPT | Performed by: INTERNAL MEDICINE

## 2021-10-18 NOTE — PROGRESS NOTES
REASON FOR VISIT:    Dolores Obrien is a 77year old female who presents for a MA Supervisit.     female     Patient Care Team: Patient Care Team:  Jeramy Troy MD as PCP - General (Internal Medicine)  Theresa Dorantes PT as Physical Therapist    [place] LDL <100 mg/dL 111(H) 121(H) 113(H) 105(H) 110 102 108     BUN and Cr Latest Ref Rng & Units 3/24/2021 10/9/2020 8/24/2020 11/5/2019 10/8/2018 6/25/2018 4/3/2018   BUN 7 - 18 mg/dL 19(H) 25(H) 16 14 19 11 13   Creatinine 0.55 - 1.02 mg/dL 0.86 1.00 1.05( things (over the last two weeks)?: Not at all  Feeling down, depressed, or hopeless (over the last two weeks)?: Not at all  PHQ-2 SCORE: 0        Advance Directives     Do you have a healthcare power of ?: Yes  Do you have a living will?: Yes     C (obstructive sleep apnea)    • Osteoarthritis    • Osteoarthritis of both knees 4/8/2014   • Pes planus (flat feet)    • Severe obesity (BMI >= 40) ([facility]) 4/10/2017   • Sleep apnea    • Unspecified essential hypertension    • Visual impairment     glasses f SYSTEMS:     General/Constitutional:   General able to do usual activities good general state of health, no fatigue, no fever, no weakness, no weight loss or gain . HEENT/Neck:   Head no headache, no dizziness, no lightheadedness.  Eyes normal vision, no varicosities, no claudication. Dermatologic:   Rash no. Neurologic:   Patient denies dizziness, fainting, loss of consciousness, weakness. Memory loss none. Tingling/numbness none. Trouble with balance none.    Psychiatric:   Patient denies depression, range of motion -severe OA knees. Upper extremity joints: normal.   NEUROLOGICAL:   Babinski: negative/all reflexes are normal.   Cerebellar Testing grossly/intact: yes. Cranial Nerves: CN's II-XII grossly intact.    Gait: normal.   Mental Status: Alert

## 2021-10-20 ENCOUNTER — APPOINTMENT (OUTPATIENT)
Dept: PHYSICAL THERAPY | Age: 76
End: 2021-10-20
Attending: PHYSICIAN ASSISTANT
Payer: MEDICARE

## 2021-10-21 ENCOUNTER — LABORATORY ENCOUNTER (OUTPATIENT)
Dept: LAB | Age: 76
End: 2021-10-21
Attending: INTERNAL MEDICINE
Payer: MEDICARE

## 2021-10-21 DIAGNOSIS — Z00.00 ROUTINE GENERAL MEDICAL EXAMINATION AT A HEALTH CARE FACILITY: ICD-10-CM

## 2021-10-21 PROCEDURE — 81001 URINALYSIS AUTO W/SCOPE: CPT | Performed by: INTERNAL MEDICINE

## 2021-10-21 PROCEDURE — 85025 COMPLETE CBC W/AUTO DIFF WBC: CPT | Performed by: INTERNAL MEDICINE

## 2021-10-21 PROCEDURE — 80053 COMPREHEN METABOLIC PANEL: CPT | Performed by: INTERNAL MEDICINE

## 2021-10-21 PROCEDURE — 80061 LIPID PANEL: CPT | Performed by: INTERNAL MEDICINE

## 2021-10-21 PROCEDURE — 82306 VITAMIN D 25 HYDROXY: CPT

## 2021-10-21 PROCEDURE — 84436 ASSAY OF TOTAL THYROXINE: CPT | Performed by: INTERNAL MEDICINE

## 2021-10-21 PROCEDURE — 83036 HEMOGLOBIN GLYCOSYLATED A1C: CPT | Performed by: INTERNAL MEDICINE

## 2021-10-21 PROCEDURE — 84443 ASSAY THYROID STIM HORMONE: CPT | Performed by: INTERNAL MEDICINE

## 2021-10-22 ENCOUNTER — APPOINTMENT (OUTPATIENT)
Dept: PHYSICAL THERAPY | Age: 76
End: 2021-10-22
Attending: PHYSICIAN ASSISTANT
Payer: MEDICARE

## 2021-10-27 ENCOUNTER — APPOINTMENT (OUTPATIENT)
Dept: PHYSICAL THERAPY | Age: 76
End: 2021-10-27
Attending: PHYSICIAN ASSISTANT
Payer: MEDICARE

## 2021-10-29 ENCOUNTER — APPOINTMENT (OUTPATIENT)
Dept: PHYSICAL THERAPY | Age: 76
End: 2021-10-29
Attending: PHYSICIAN ASSISTANT
Payer: MEDICARE

## 2021-11-03 ENCOUNTER — APPOINTMENT (OUTPATIENT)
Dept: PHYSICAL THERAPY | Age: 76
End: 2021-11-03
Attending: PHYSICIAN ASSISTANT
Payer: MEDICARE

## 2021-11-05 ENCOUNTER — APPOINTMENT (OUTPATIENT)
Dept: PHYSICAL THERAPY | Age: 76
End: 2021-11-05
Attending: INTERNAL MEDICINE
Payer: MEDICARE

## 2021-11-05 ENCOUNTER — APPOINTMENT (OUTPATIENT)
Dept: PHYSICAL THERAPY | Age: 76
End: 2021-11-05
Attending: PHYSICIAN ASSISTANT
Payer: MEDICARE

## 2021-11-08 ENCOUNTER — APPOINTMENT (OUTPATIENT)
Dept: PHYSICAL THERAPY | Age: 76
End: 2021-11-08
Attending: INTERNAL MEDICINE
Payer: MEDICARE

## 2021-11-10 ENCOUNTER — APPOINTMENT (OUTPATIENT)
Dept: PHYSICAL THERAPY | Age: 76
End: 2021-11-10
Attending: PHYSICIAN ASSISTANT
Payer: MEDICARE

## 2021-11-11 ENCOUNTER — APPOINTMENT (OUTPATIENT)
Dept: PHYSICAL THERAPY | Age: 76
End: 2021-11-11
Attending: INTERNAL MEDICINE
Payer: MEDICARE

## 2021-11-14 DIAGNOSIS — I10 ESSENTIAL HYPERTENSION, BENIGN: ICD-10-CM

## 2021-11-15 RX ORDER — TRIAMTERENE AND HYDROCHLOROTHIAZIDE 37.5; 25 MG/1; MG/1
CAPSULE ORAL
Qty: 90 CAPSULE | Refills: 3 | Status: SHIPPED | OUTPATIENT
Start: 2021-11-15

## 2021-11-15 NOTE — TELEPHONE ENCOUNTER
Protocol passed     Requesting: triamterene-hydrochlorothiazide 37.5-25mg     LOV: 10/18/21   RTC: 4 weeks   Filled: 11/30/20 #90 3 refills   Recent Labs: 10/21/21     Upcoming OV: none scheduled

## 2021-11-16 ENCOUNTER — HOSPITAL ENCOUNTER (OUTPATIENT)
Dept: MAMMOGRAPHY | Age: 76
Discharge: HOME | End: 2021-11-16
Attending: INTERNAL MEDICINE
Payer: MEDICARE

## 2021-11-16 ENCOUNTER — HOSPITAL ENCOUNTER (OUTPATIENT)
Dept: BONE DENSITY | Age: 76
Discharge: HOME | End: 2021-11-16
Attending: INTERNAL MEDICINE
Payer: MEDICARE

## 2021-11-16 DIAGNOSIS — Z00.00 ROUTINE GENERAL MEDICAL EXAMINATION AT A HEALTH CARE FACILITY: ICD-10-CM

## 2021-11-16 PROCEDURE — 77063 BREAST TOMOSYNTHESIS BI: CPT | Performed by: INTERNAL MEDICINE

## 2021-11-16 PROCEDURE — 77080 DXA BONE DENSITY AXIAL: CPT | Performed by: INTERNAL MEDICINE

## 2021-11-16 PROCEDURE — 77067 SCR MAMMO BI INCL CAD: CPT | Performed by: INTERNAL MEDICINE

## 2021-11-24 DIAGNOSIS — I10 ESSENTIAL HYPERTENSION, BENIGN: ICD-10-CM

## 2021-11-26 RX ORDER — AMLODIPINE BESYLATE 5 MG/1
TABLET ORAL
Qty: 180 TABLET | Refills: 3 | Status: SHIPPED | OUTPATIENT
Start: 2021-11-26

## 2021-11-26 NOTE — TELEPHONE ENCOUNTER
Protocol passed     Requesting: amlodipine 5mg     LOV: 10/18/21   RTC: 4 week   Filled: 11/30/20 #180 3 refills   Recent Labs:  10/21/21     Upcoming OV: none scheduled

## 2021-11-30 ENCOUNTER — HOSPITAL ENCOUNTER (OUTPATIENT)
Dept: MAMMOGRAPHY | Facility: HOSPITAL | Age: 76
Discharge: HOME | End: 2021-11-30
Attending: INTERNAL MEDICINE
Payer: MEDICARE

## 2021-11-30 ENCOUNTER — TELEPHONE (OUTPATIENT)
Dept: INTERNAL MEDICINE CLINIC | Facility: CLINIC | Age: 76
End: 2021-11-30

## 2021-11-30 DIAGNOSIS — R92.2 INCONCLUSIVE MAMMOGRAM: ICD-10-CM

## 2021-11-30 DIAGNOSIS — R92.8 ABNORMAL MAMMOGRAM OF RIGHT BREAST: Primary | ICD-10-CM

## 2021-11-30 PROCEDURE — 76642 ULTRASOUND BREAST LIMITED: CPT | Performed by: INTERNAL MEDICINE

## 2021-11-30 PROCEDURE — 77065 DX MAMMO INCL CAD UNI: CPT | Performed by: INTERNAL MEDICINE

## 2021-11-30 PROCEDURE — 77061 BREAST TOMOSYNTHESIS UNI: CPT | Performed by: INTERNAL MEDICINE

## 2021-11-30 NOTE — IMAGING NOTE
This Breast Care RN assisted Dr. Kaylynn Vallecillo with recommendation for a right breast 1 site ultrasound guided biopsy for mass. Procedure reviewed and all questions answered. Emotional and educational support given.    On the day of the biopsy, pt instructed to t

## 2021-11-30 NOTE — TELEPHONE ENCOUNTER
Results and recommendations discussed with patient who verbalized understanding. Bx order placed. Patient provided with phone number for scheduling bx.

## 2021-11-30 NOTE — TELEPHONE ENCOUNTER
----- Message from Chris Kendrick MD sent at 11/30/2021 10:26 AM CST -----  Reviewed results   Impression  CONCLUSION:     Indeterminate 1 cm mass in the right breast at the 11 o'clock position 9 cm from the nipple for which ultrasound-guided biopsy is rec

## 2021-12-03 RX ORDER — TEMAZEPAM 15 MG/1
CAPSULE ORAL
Qty: 30 CAPSULE | Refills: 0 | OUTPATIENT
Start: 2021-12-03

## 2021-12-06 RX ORDER — TEMAZEPAM 15 MG/1
CAPSULE ORAL
Qty: 30 CAPSULE | Refills: 0 | OUTPATIENT
Start: 2021-12-06

## 2021-12-06 NOTE — TELEPHONE ENCOUNTER
It looks like it was discontinued by Dr. Ousmane Griffiths. I can't refill it. She'll have to follow-up with him.

## 2021-12-06 NOTE — TELEPHONE ENCOUNTER
Called patient to verify if still taking   Patient uses PRN     No Protocol     Requesting:Temazepam 15mg     LOV: 10/18/21   RTC: 4 weeks   Filled: 1/15/21 #30 0 refills   Recent Labs: 10/21/21     Upcoming OV: none scheduled

## 2021-12-09 ENCOUNTER — HOSPITAL ENCOUNTER (OUTPATIENT)
Dept: MAMMOGRAPHY | Facility: HOSPITAL | Age: 76
Discharge: HOME | End: 2021-12-09
Attending: INTERNAL MEDICINE
Payer: MEDICARE

## 2021-12-09 DIAGNOSIS — R92.8 ABNORMAL MAMMOGRAM OF RIGHT BREAST: ICD-10-CM

## 2021-12-09 PROCEDURE — 88305 TISSUE EXAM BY PATHOLOGIST: CPT | Performed by: INTERNAL MEDICINE

## 2021-12-09 PROCEDURE — 19083 BX BREAST 1ST LESION US IMAG: CPT | Performed by: INTERNAL MEDICINE

## 2021-12-09 PROCEDURE — 88342 IMHCHEM/IMCYTCHM 1ST ANTB: CPT | Performed by: INTERNAL MEDICINE

## 2021-12-09 PROCEDURE — 77065 DX MAMMO INCL CAD UNI: CPT | Performed by: INTERNAL MEDICINE

## 2021-12-13 ENCOUNTER — TELEPHONE (OUTPATIENT)
Dept: INTERNAL MEDICINE CLINIC | Facility: CLINIC | Age: 76
End: 2021-12-13

## 2021-12-13 DIAGNOSIS — R89.7 ABNORMAL BREAST BIOPSY: ICD-10-CM

## 2021-12-13 DIAGNOSIS — R92.8 ABNORMAL MAMMOGRAM: Primary | ICD-10-CM

## 2021-12-13 RX ORDER — TEMAZEPAM 15 MG/1
15 CAPSULE ORAL NIGHTLY PRN
Qty: 30 CAPSULE | Refills: 0 | Status: SHIPPED | OUTPATIENT
Start: 2021-12-13 | End: 2022-01-12

## 2021-12-13 NOTE — TELEPHONE ENCOUNTER
Received call from Marleny/Imaging     Right breast biopsy results, Atypical ALH (see pathology results report from 12/9/21)    Surgical consultation recommended, informed Izaiah Hernandez that Dr. Jerelene Nageotte usually refers to Dr. Mishel Laws.  Please review and advise, TY.

## 2021-12-13 NOTE — IMAGING NOTE
Spoke with TRACY Hernandez in Dr. Danya Tovar office.  Reported pathology results for Gwenevere Filler- breast biopsy 12-09-21 as follows:   Final Diagnosis:   Right breast mass 11:00, ultrasound-guided 12-gauge needle core biopsies:  -Incidental foci of atypical lo

## 2021-12-13 NOTE — IMAGING NOTE
1517: Spoke with Veronica Arrieta post ultrasound guided right breast biopsy. Ms. Natividad Hay identified with name and date of birth. Introduced myself as breast care coordinator. Reinforced post biopsy care and instruction.   Ms. Natividad Hay denies any issues wi

## 2021-12-20 DIAGNOSIS — I10 ESSENTIAL HYPERTENSION, BENIGN: ICD-10-CM

## 2021-12-21 ENCOUNTER — APPOINTMENT (OUTPATIENT)
Dept: HEMATOLOGY/ONCOLOGY | Facility: HOSPITAL | Age: 76
End: 2021-12-21
Attending: SURGERY
Payer: MEDICARE

## 2021-12-21 RX ORDER — HYDRALAZINE HYDROCHLORIDE 25 MG/1
TABLET, FILM COATED ORAL
Qty: 270 TABLET | Refills: 3 | Status: SHIPPED | OUTPATIENT
Start: 2021-12-21

## 2021-12-30 ENCOUNTER — OFFICE VISIT (OUTPATIENT)
Dept: SURGERY | Facility: CLINIC | Age: 76
End: 2021-12-30
Payer: COMMERCIAL

## 2021-12-30 VITALS — SYSTOLIC BLOOD PRESSURE: 120 MMHG | DIASTOLIC BLOOD PRESSURE: 82 MMHG

## 2021-12-30 DIAGNOSIS — R20.2 TINGLING SENSATION: Primary | ICD-10-CM

## 2021-12-30 DIAGNOSIS — M54.2 NECK PAIN: ICD-10-CM

## 2021-12-30 PROCEDURE — 3079F DIAST BP 80-89 MM HG: CPT | Performed by: NEUROLOGICAL SURGERY

## 2021-12-30 PROCEDURE — 99212 OFFICE O/P EST SF 10 MIN: CPT | Performed by: NEUROLOGICAL SURGERY

## 2021-12-30 PROCEDURE — 3074F SYST BP LT 130 MM HG: CPT | Performed by: NEUROLOGICAL SURGERY

## 2021-12-30 RX ORDER — FAMOTIDINE 40 MG/1
TABLET, FILM COATED ORAL
Qty: 90 TABLET | Refills: 3 | Status: SHIPPED | OUTPATIENT
Start: 2021-12-30

## 2021-12-30 NOTE — PROGRESS NOTES
Neurosurgery Clinic Visit  2021    Ari Gates PCP:  Bekah Zhang MD    1945 MRN [id number]     HISTORY OF PRESENT ILLNESS:  Ari Gates is a(n) 77year old female here for follow-up regarding her neck  She got her CT and x-rays

## 2021-12-30 NOTE — PROGRESS NOTES
Pt is here for a 2 month follow up on her neck   States she is doing good states she has no more N/T right arm and no pain in her neck. States she was doing PT but now she doing it at home.

## 2022-01-06 RX ORDER — LEVOCETIRIZINE DIHYDROCHLORIDE 5 MG/1
TABLET, FILM COATED ORAL
Qty: 90 TABLET | Refills: 0 | Status: SHIPPED | OUTPATIENT
Start: 2022-01-06

## 2022-01-12 ENCOUNTER — OFFICE VISIT (OUTPATIENT)
Dept: SURGERY | Facility: CLINIC | Age: 77
End: 2022-01-12
Payer: COMMERCIAL

## 2022-01-12 VITALS
OXYGEN SATURATION: 98 % | DIASTOLIC BLOOD PRESSURE: 40 MMHG | HEART RATE: 56 BPM | HEIGHT: 63 IN | WEIGHT: 273 LBS | SYSTOLIC BLOOD PRESSURE: 116 MMHG | RESPIRATION RATE: 18 BRPM | BODY MASS INDEX: 48.37 KG/M2

## 2022-01-12 DIAGNOSIS — R92.8 ABNORMAL MAMMOGRAM OF RIGHT BREAST: Primary | ICD-10-CM

## 2022-01-12 DIAGNOSIS — N60.91 ATYPICAL LOBULAR HYPERPLASIA (ALH) OF RIGHT BREAST: ICD-10-CM

## 2022-01-12 PROCEDURE — 99204 OFFICE O/P NEW MOD 45 MIN: CPT | Performed by: SURGERY

## 2022-01-12 PROCEDURE — 3078F DIAST BP <80 MM HG: CPT | Performed by: SURGERY

## 2022-01-12 PROCEDURE — 3074F SYST BP LT 130 MM HG: CPT | Performed by: SURGERY

## 2022-01-12 PROCEDURE — 3008F BODY MASS INDEX DOCD: CPT | Performed by: SURGERY

## 2022-01-12 NOTE — PROGRESS NOTES
Breast Surgery New Patient Consultation    This is the first visit for this 77year old woman, referred by Dr. Michelle Quijano, who presents for evaluation of atypical lobular hyperplasia right breast.    History of Present Illness:   Ms. Bowen Hayden is a 77 partial hystero. • MCKENZIE BIOPSY STEREO NODULE 1 SITE RIGHT (CPT=19081)  2017    benign   • OOPHORECTOMY     • TOTAL ABDOM HYSTERECTOMY         Gynecological History:  Pt is a   Pt was 13years old at time of first pregnancy.     She denies any cu Cancer Mother        She is not of Ashkenazi Buddhism ancestry. Social History:    Alcohol use Yes   Comment: social         Smoking status: Never Smoker   Smokeless tobacco: Never Used     Ms. Tani Kay is  with 3 children.  She has 4 siblin Hematologic/Lymphatic:  The patient denies easily bruising or bleeding or persistent swollen glands or lymph nodes. Musculoskeletal:  The patient denies muscle aches/pain, joint pain, stiff joints, +neck pain, back pain or bone pain.     Neuropsychi There are no dominant masses in the breast. The axillary tail is normal.  Left breast:   The skin, nipple, and areola appear normal. There is no skin dimpling with movement of the pectoralis. There is no nipple retraction.  No nipple discharge can be elicit surgical intervention or treatment is required at this time. We briefly discussed the role of chemoprevention and the fact that she is concerned this may interfere with other medical conditions and therefore this will be deferred at this time.   We will pl

## 2022-02-03 RX ORDER — LEVOCETIRIZINE DIHYDROCHLORIDE 5 MG/1
TABLET, FILM COATED ORAL
Qty: 30 TABLET | Refills: 0 | OUTPATIENT
Start: 2022-02-03

## 2022-02-04 ENCOUNTER — PATIENT MESSAGE (OUTPATIENT)
Dept: INTERNAL MEDICINE CLINIC | Facility: CLINIC | Age: 77
End: 2022-02-04

## 2022-02-04 NOTE — TELEPHONE ENCOUNTER
Ruma Bob LPN 0/9/3507 0:68 PM CST      ----- Message -----  From: Radha Elaine  Sent: 2/4/2022 1:06 PM CST  To: [facility] Clinical Staff  Subject: Denied     Why was my prescription for LEVOCETIRIZINE 5 MG Oral denied

## 2022-02-04 NOTE — TELEPHONE ENCOUNTER
Dipika Genao LPN 2/3/6724 9:03 PM CST      ----- Message -----  From: Belkis Webb  Sent: 2/4/2022 3:17 PM CST  To: [facility] Clinical Staff  Subject: Denied     They only gave me 30 pills

## 2022-02-07 RX ORDER — LEVOCETIRIZINE DIHYDROCHLORIDE 5 MG/1
5 TABLET, FILM COATED ORAL EVERY EVENING
Qty: 90 TABLET | Refills: 0 | OUTPATIENT
Start: 2022-02-07

## 2022-03-23 ENCOUNTER — OFFICE VISIT (OUTPATIENT)
Dept: INTERNAL MEDICINE CLINIC | Facility: CLINIC | Age: 77
End: 2022-03-23
Payer: COMMERCIAL

## 2022-03-23 VITALS
HEART RATE: 65 BPM | TEMPERATURE: 98 F | RESPIRATION RATE: 16 BRPM | HEIGHT: 63 IN | SYSTOLIC BLOOD PRESSURE: 116 MMHG | OXYGEN SATURATION: 97 % | WEIGHT: 261 LBS | BODY MASS INDEX: 46.25 KG/M2 | DIASTOLIC BLOOD PRESSURE: 62 MMHG

## 2022-03-23 DIAGNOSIS — R25.2 MUSCLE CRAMP: Primary | ICD-10-CM

## 2022-03-23 PROCEDURE — 3078F DIAST BP <80 MM HG: CPT | Performed by: INTERNAL MEDICINE

## 2022-03-23 PROCEDURE — 99213 OFFICE O/P EST LOW 20 MIN: CPT | Performed by: INTERNAL MEDICINE

## 2022-03-23 PROCEDURE — 3074F SYST BP LT 130 MM HG: CPT | Performed by: INTERNAL MEDICINE

## 2022-03-23 PROCEDURE — 3008F BODY MASS INDEX DOCD: CPT | Performed by: INTERNAL MEDICINE

## 2022-03-23 RX ORDER — CYCLOBENZAPRINE HCL 5 MG
5 TABLET ORAL 3 TIMES DAILY PRN
Qty: 30 TABLET | Refills: 0 | Status: SHIPPED | OUTPATIENT
Start: 2022-03-23 | End: 2022-04-02

## 2022-03-23 RX ORDER — MELOXICAM 7.5 MG/1
7.5 TABLET ORAL DAILY
Qty: 30 TABLET | Refills: 1 | Status: SHIPPED | OUTPATIENT
Start: 2022-03-23

## 2022-04-03 ENCOUNTER — PATIENT MESSAGE (OUTPATIENT)
Dept: INTERNAL MEDICINE CLINIC | Facility: CLINIC | Age: 77
End: 2022-04-03

## 2022-04-04 RX ORDER — LEVOCETIRIZINE DIHYDROCHLORIDE 5 MG/1
TABLET, FILM COATED ORAL
Qty: 90 TABLET | Refills: 0 | OUTPATIENT
Start: 2022-04-04

## 2022-04-04 RX ORDER — ALPRAZOLAM 0.5 MG/1
0.5 TABLET ORAL 3 TIMES DAILY PRN
Qty: 15 TABLET | Refills: 0 | Status: SHIPPED | OUTPATIENT
Start: 2022-04-04 | End: 2022-04-14

## 2022-04-04 RX ORDER — LEVOCETIRIZINE DIHYDROCHLORIDE 5 MG/1
5 TABLET, FILM COATED ORAL EVERY EVENING
Qty: 90 TABLET | Refills: 0 | Status: SHIPPED | OUTPATIENT
Start: 2022-04-04

## 2022-04-04 NOTE — TELEPHONE ENCOUNTER
From: Alice Mireles  To: Kandace Rollins MD  Sent: 4/3/2022 1:14 PM CDT  Subject: Medication Anxiety    My son has been put on hospice in [facility] [facility] [place] doctor Juan call me in a prescription for anxiety medication . I get panic when I have to fly.  My last prescription was December 2019    ASAP I'm leaving Wednesday am

## 2022-04-21 RX ORDER — PANTOPRAZOLE SODIUM 40 MG/1
TABLET, DELAYED RELEASE ORAL
Qty: 90 TABLET | Refills: 3 | Status: SHIPPED | OUTPATIENT
Start: 2022-04-21

## 2022-04-21 NOTE — TELEPHONE ENCOUNTER
No Protocol     Requesting: pantoprazole 40mg     LOV: 3/23/22   RTC: no follow ups on file   Filled: 11/20/20 #90 3 refills   Recent Labs: 10/21/21     Upcoming OV: none scheduled

## 2022-05-02 RX ORDER — LEVOCETIRIZINE DIHYDROCHLORIDE 5 MG/1
TABLET, FILM COATED ORAL
Qty: 90 TABLET | Refills: 0 | Status: SHIPPED | OUTPATIENT
Start: 2022-05-02

## 2022-06-17 ENCOUNTER — HOSPITAL ENCOUNTER (OUTPATIENT)
Dept: MAMMOGRAPHY | Facility: HOSPITAL | Age: 77
Discharge: HOME | End: 2022-06-17
Attending: SURGERY
Payer: MEDICARE

## 2022-06-17 DIAGNOSIS — R92.8 ABNORMAL MAMMOGRAM OF RIGHT BREAST: ICD-10-CM

## 2022-06-17 DIAGNOSIS — N60.91 ATYPICAL LOBULAR HYPERPLASIA (ALH) OF RIGHT BREAST: ICD-10-CM

## 2022-06-17 PROCEDURE — 76642 ULTRASOUND BREAST LIMITED: CPT | Performed by: SURGERY

## 2022-06-17 PROCEDURE — 77061 BREAST TOMOSYNTHESIS UNI: CPT | Performed by: SURGERY

## 2022-06-17 PROCEDURE — 77065 DX MAMMO INCL CAD UNI: CPT | Performed by: SURGERY

## 2022-06-21 ENCOUNTER — OFFICE VISIT (OUTPATIENT)
Dept: SURGERY | Facility: CLINIC | Age: 77
End: 2022-06-21
Payer: COMMERCIAL

## 2022-06-21 VITALS
OXYGEN SATURATION: 96 % | RESPIRATION RATE: 18 BRPM | SYSTOLIC BLOOD PRESSURE: 141 MMHG | WEIGHT: 265.19 LBS | HEIGHT: 63 IN | TEMPERATURE: 98 F | HEART RATE: 69 BPM | BODY MASS INDEX: 46.99 KG/M2 | DIASTOLIC BLOOD PRESSURE: 75 MMHG

## 2022-06-21 DIAGNOSIS — N63.10 MASS OF RIGHT BREAST, UNSPECIFIED QUADRANT: Primary | ICD-10-CM

## 2022-06-21 PROCEDURE — 3077F SYST BP >= 140 MM HG: CPT | Performed by: SURGERY

## 2022-06-21 PROCEDURE — 3078F DIAST BP <80 MM HG: CPT | Performed by: SURGERY

## 2022-06-21 PROCEDURE — 1126F AMNT PAIN NOTED NONE PRSNT: CPT | Performed by: SURGERY

## 2022-06-21 PROCEDURE — 99213 OFFICE O/P EST LOW 20 MIN: CPT | Performed by: SURGERY

## 2022-06-21 PROCEDURE — 3008F BODY MASS INDEX DOCD: CPT | Performed by: SURGERY

## 2022-08-04 RX ORDER — LEVOCETIRIZINE DIHYDROCHLORIDE 5 MG/1
TABLET, FILM COATED ORAL
Qty: 90 TABLET | Refills: 0 | Status: SHIPPED | OUTPATIENT
Start: 2022-08-04

## 2022-08-04 NOTE — TELEPHONE ENCOUNTER
Protocol passed     Requesting: levocetirizine 5mg     LOV: 3/23/22  RTC: none noted   Filled: 5/2/22 #90 0 refills   Recent Labs: 10/21/21     Upcoming OV: 11/15/22

## 2022-09-30 ENCOUNTER — PATIENT MESSAGE (OUTPATIENT)
Dept: INTERNAL MEDICINE CLINIC | Facility: CLINIC | Age: 77
End: 2022-09-30

## 2022-09-30 NOTE — TELEPHONE ENCOUNTER
From: Sukhjinder Peña  To: Aroldo Terry MD  Sent: 9/30/2022 12:30 PM CDT  Subject: Covid Vaccinations    Please update my records. .    2nd Covid Booster was at [facility] 5/20/22  3rd Covid Booster was at [facility] 9/30/22

## 2022-10-08 ENCOUNTER — PATIENT MESSAGE (OUTPATIENT)
Dept: INTERNAL MEDICINE CLINIC | Facility: CLINIC | Age: 77
End: 2022-10-08

## 2022-10-10 NOTE — TELEPHONE ENCOUNTER
From: Sean Mendez  To:  Flip Urena MD  Sent: 10/8/2022 11:36 AM CDT  Subject: Flu shot    Please update my records I got my flu shot at [place] on 10/8/22 see attached

## 2022-10-11 RX ORDER — TEMAZEPAM 15 MG/1
CAPSULE ORAL
Qty: 30 CAPSULE | Refills: 0 | Status: SHIPPED | OUTPATIENT
Start: 2022-10-11

## 2022-10-11 NOTE — TELEPHONE ENCOUNTER
No Protocol     Requesting: temazepam 15mg     LOV: 3/23/22   RTC: no follow us on file   Filled: 12/13/21 #30 0 refills   Recent Labs: 10/21/21     Upcoming OV: 11/21/22

## 2022-10-19 ENCOUNTER — PATIENT MESSAGE (OUTPATIENT)
Dept: INTERNAL MEDICINE CLINIC | Facility: CLINIC | Age: 77
End: 2022-10-19

## 2022-10-19 DIAGNOSIS — I10 ESSENTIAL HYPERTENSION, BENIGN: ICD-10-CM

## 2022-10-20 RX ORDER — AMLODIPINE BESYLATE 5 MG/1
5 TABLET ORAL 2 TIMES DAILY
Qty: 180 TABLET | Refills: 3 | Status: SHIPPED | OUTPATIENT
Start: 2022-10-20

## 2022-10-20 RX ORDER — HYDRALAZINE HYDROCHLORIDE 25 MG/1
25 TABLET, FILM COATED ORAL 3 TIMES DAILY
Qty: 270 TABLET | Refills: 3 | Status: SHIPPED | OUTPATIENT
Start: 2022-10-20

## 2022-10-20 RX ORDER — FAMOTIDINE 40 MG/1
40 TABLET, FILM COATED ORAL NIGHTLY PRN
Qty: 90 TABLET | Refills: 0 | Status: SHIPPED | OUTPATIENT
Start: 2022-10-20

## 2022-10-20 RX ORDER — TRIAMTERENE AND HYDROCHLOROTHIAZIDE 37.5; 25 MG/1; MG/1
1 CAPSULE ORAL EVERY MORNING
Qty: 90 CAPSULE | Refills: 2 | Status: SHIPPED | OUTPATIENT
Start: 2022-10-20 | End: 2023-01-18

## 2022-10-20 NOTE — TELEPHONE ENCOUNTER
From: Brad Arthur  To:  Titi Miles MD  Sent: 10/19/2022 6:15 PM CDT  Subject: Precription renewal    [facility] needs a prescription for my medicines listed below    Amlodipine  Famotidine  Hydralazine  Triamt/Hctz    Thank you

## 2022-10-20 NOTE — TELEPHONE ENCOUNTER
Protocol passed     Requesting:   Hydralazine 25mg filled 12/21/21 #270 3 refills   Amlodipine 5mg filled 11/26/21 #180 3 refills   Triamterene-hydrochlorothiazide 37.5-25mg filled 11/15/21 #90 3 refills      Protocol Failed   Famotidine 40mg     LOV: 3/23/22   RTC: none noted   Recent Labs: 10/21/21     Upcoming OV:11/21/22

## 2022-11-10 ENCOUNTER — HOSPITAL ENCOUNTER (OUTPATIENT)
Dept: MAMMOGRAPHY | Facility: HOSPITAL | Age: 77
Discharge: HOME | End: 2022-11-10
Attending: SURGERY
Payer: MEDICARE

## 2022-11-10 DIAGNOSIS — N63.10 MASS OF RIGHT BREAST, UNSPECIFIED QUADRANT: ICD-10-CM

## 2022-11-10 PROCEDURE — 77062 BREAST TOMOSYNTHESIS BI: CPT | Performed by: SURGERY

## 2022-11-10 PROCEDURE — 77066 DX MAMMO INCL CAD BI: CPT | Performed by: SURGERY

## 2022-11-10 PROCEDURE — 76642 ULTRASOUND BREAST LIMITED: CPT | Performed by: SURGERY

## 2022-11-21 ENCOUNTER — OFFICE VISIT (OUTPATIENT)
Dept: INTERNAL MEDICINE CLINIC | Facility: CLINIC | Age: 77
End: 2022-11-21
Payer: COMMERCIAL

## 2022-11-21 VITALS
DIASTOLIC BLOOD PRESSURE: 80 MMHG | HEART RATE: 93 BPM | SYSTOLIC BLOOD PRESSURE: 134 MMHG | TEMPERATURE: 97 F | WEIGHT: 266.63 LBS | BODY MASS INDEX: 47.24 KG/M2 | OXYGEN SATURATION: 98 % | HEIGHT: 63 IN | RESPIRATION RATE: 16 BRPM

## 2022-11-21 DIAGNOSIS — I10 ESSENTIAL HYPERTENSION, BENIGN: Chronic | ICD-10-CM

## 2022-11-21 DIAGNOSIS — Z00.00 ROUTINE GENERAL MEDICAL EXAMINATION AT A HEALTH CARE FACILITY: Primary | ICD-10-CM

## 2022-11-21 DIAGNOSIS — E66.01 MORBID OBESITY (HCC): Chronic | ICD-10-CM

## 2022-11-21 DIAGNOSIS — E78.00 PURE HYPERCHOLESTEROLEMIA: Chronic | ICD-10-CM

## 2022-11-21 DIAGNOSIS — G47.33 OBSTRUCTIVE SLEEP APNEA: Chronic | ICD-10-CM

## 2022-11-21 LAB
ATRIAL RATE: 81 BPM
P AXIS: 36 DEGREES
P-R INTERVAL: 208 MS
Q-T INTERVAL: 382 MS
QRS DURATION: 104 MS
QTC CALCULATION (BEZET): 443 MS
R AXIS: -30 DEGREES
T AXIS: 27 DEGREES
VENTRICULAR RATE: 81 BPM

## 2022-12-06 ENCOUNTER — LAB ENCOUNTER (OUTPATIENT)
Dept: LAB | Age: 77
End: 2022-12-06
Attending: INTERNAL MEDICINE
Payer: MEDICARE

## 2022-12-06 DIAGNOSIS — E78.00 PURE HYPERCHOLESTEROLEMIA: Chronic | ICD-10-CM

## 2022-12-06 DIAGNOSIS — Z00.00 ROUTINE GENERAL MEDICAL EXAMINATION AT A HEALTH CARE FACILITY: ICD-10-CM

## 2022-12-06 DIAGNOSIS — I10 ESSENTIAL HYPERTENSION, BENIGN: Chronic | ICD-10-CM

## 2022-12-06 LAB
ALBUMIN SERPL-MCNC: 3.5 G/DL (ref 3.4–5)
ALBUMIN/GLOB SERPL: 1.1 {RATIO} (ref 1–2)
ALP LIVER SERPL-CCNC: 65 U/L
ALT SERPL-CCNC: 20 U/L
ANION GAP SERPL CALC-SCNC: 7 MMOL/L (ref 0–18)
AST SERPL-CCNC: 15 U/L (ref 15–37)
BASOPHILS # BLD AUTO: 0.05 X10(3) UL (ref 0–0.2)
BASOPHILS NFR BLD AUTO: 0.7 %
BILIRUB SERPL-MCNC: 0.6 MG/DL (ref 0.1–2)
BILIRUB UR QL: NEGATIVE
BUN BLD-MCNC: 20 MG/DL (ref 7–18)
BUN/CREAT SERPL: 20.4 (ref 10–20)
CALCIUM BLD-MCNC: 9.3 MG/DL (ref 8.5–10.1)
CHLORIDE SERPL-SCNC: 108 MMOL/L (ref 98–112)
CHOLEST SERPL-MCNC: 188 MG/DL (ref ?–200)
CLARITY UR: CLEAR
CO2 SERPL-SCNC: 30 MMOL/L (ref 21–32)
COLOR UR: YELLOW
CREAT BLD-MCNC: 0.98 MG/DL
DEPRECATED RDW RBC AUTO: 46.5 FL (ref 35.1–46.3)
EOSINOPHIL # BLD AUTO: 0.2 X10(3) UL (ref 0–0.7)
EOSINOPHIL NFR BLD AUTO: 2.9 %
ERYTHROCYTE [DISTWIDTH] IN BLOOD BY AUTOMATED COUNT: 15.1 % (ref 11–15)
EST. AVERAGE GLUCOSE BLD GHB EST-MCNC: 103 MG/DL (ref 68–126)
FASTING PATIENT LIPID ANSWER: YES
FASTING STATUS PATIENT QL REPORTED: YES
GFR SERPLBLD BASED ON 1.73 SQ M-ARVRAT: 60 ML/MIN/1.73M2 (ref 60–?)
GLOBULIN PLAS-MCNC: 3.2 G/DL (ref 2.8–4.4)
GLUCOSE BLD-MCNC: 93 MG/DL (ref 70–99)
GLUCOSE UR-MCNC: NEGATIVE MG/DL
HBA1C MFR BLD: 5.2 % (ref ?–5.7)
HCT VFR BLD AUTO: 42 %
HDLC SERPL-MCNC: 63 MG/DL (ref 40–59)
HGB BLD-MCNC: 13.6 G/DL
HGB UR QL STRIP.AUTO: NEGATIVE
HYALINE CASTS #/AREA URNS AUTO: PRESENT /LPF
IMM GRANULOCYTES # BLD AUTO: 0.01 X10(3) UL (ref 0–1)
IMM GRANULOCYTES NFR BLD: 0.1 %
KETONES UR-MCNC: NEGATIVE MG/DL
LDLC SERPL CALC-MCNC: 114 MG/DL (ref ?–100)
LYMPHOCYTES # BLD AUTO: 2.45 X10(3) UL (ref 1–4)
LYMPHOCYTES NFR BLD AUTO: 35.8 %
MCH RBC QN AUTO: 27.4 PG (ref 26–34)
MCHC RBC AUTO-ENTMCNC: 32.4 G/DL (ref 31–37)
MCV RBC AUTO: 84.7 FL
MONOCYTES # BLD AUTO: 0.53 X10(3) UL (ref 0.1–1)
MONOCYTES NFR BLD AUTO: 7.7 %
NEUTROPHILS # BLD AUTO: 3.61 X10 (3) UL (ref 1.5–7.7)
NEUTROPHILS # BLD AUTO: 3.61 X10(3) UL (ref 1.5–7.7)
NEUTROPHILS NFR BLD AUTO: 52.8 %
NITRITE UR QL STRIP.AUTO: NEGATIVE
NONHDLC SERPL-MCNC: 125 MG/DL (ref ?–130)
OSMOLALITY SERPL CALC.SUM OF ELEC: 302 MOSM/KG (ref 275–295)
PH UR: 5.5 [PH] (ref 5–8)
PLATELET # BLD AUTO: 193 10(3)UL (ref 150–450)
POTASSIUM SERPL-SCNC: 3.9 MMOL/L (ref 3.5–5.1)
PROT SERPL-MCNC: 6.7 G/DL (ref 6.4–8.2)
PROT UR-MCNC: NEGATIVE MG/DL
RBC # BLD AUTO: 4.96 X10(6)UL
SODIUM SERPL-SCNC: 145 MMOL/L (ref 136–145)
SP GR UR STRIP: 1.02 (ref 1–1.03)
TRIGL SERPL-MCNC: 56 MG/DL (ref 30–149)
TSI SER-ACNC: 2.39 MIU/ML (ref 0.36–3.74)
UROBILINOGEN UR STRIP-ACNC: 0.2
VLDLC SERPL CALC-MCNC: 10 MG/DL (ref 0–30)
WBC # BLD AUTO: 6.9 X10(3) UL (ref 4–11)

## 2022-12-06 PROCEDURE — 80053 COMPREHEN METABOLIC PANEL: CPT

## 2022-12-06 PROCEDURE — 81015 MICROSCOPIC EXAM OF URINE: CPT

## 2022-12-06 PROCEDURE — 80061 LIPID PANEL: CPT

## 2022-12-06 PROCEDURE — 81001 URINALYSIS AUTO W/SCOPE: CPT

## 2022-12-06 PROCEDURE — 85025 COMPLETE CBC W/AUTO DIFF WBC: CPT

## 2022-12-06 PROCEDURE — 84443 ASSAY THYROID STIM HORMONE: CPT

## 2022-12-06 PROCEDURE — 83036 HEMOGLOBIN GLYCOSYLATED A1C: CPT

## 2023-01-01 ENCOUNTER — PATIENT MESSAGE (OUTPATIENT)
Dept: INTERNAL MEDICINE CLINIC | Facility: CLINIC | Age: 78
End: 2023-01-01

## 2023-01-01 DIAGNOSIS — M79.10 MUSCLE PAIN: Primary | ICD-10-CM

## 2023-01-04 NOTE — TELEPHONE ENCOUNTER
From: Belkis Webb  To: Tahira Sinclair MD  Sent: 1/1/2023 4:54 PM CST  Subject: Prescription    Can you call me in a prescription for the pain in my lower back I think I pull a muscle again bending.  It hurts when I get up or bend down

## 2023-01-04 NOTE — TELEPHONE ENCOUNTER
VM -  Rx from 3/23/22 OV Meloxicam 7.5 mg oral daily and Cyclobenzaprine HCl 5 mg oral 3 times daily PRN. LOV 22 for CPX - please advise if ok to refill one of the above medications.  ty

## 2023-01-05 RX ORDER — MELOXICAM 7.5 MG/1
7.5 TABLET ORAL DAILY
Qty: 30 TABLET | Refills: 1 | Status: SHIPPED | OUTPATIENT
Start: 2023-01-05 | End: 2023-01-09

## 2023-01-05 NOTE — TELEPHONE ENCOUNTER
[phone] spoke to pt, she stated she would like to try the Meloxicam 7.5 mg, not the cyclobenzaprine. Confirmed pharmacy is [facility] #[id number]. Asked pt to let us know after a few days if her symptoms are not improving. Pt v/u.

## 2023-01-07 ENCOUNTER — PATIENT MESSAGE (OUTPATIENT)
Dept: INTERNAL MEDICINE CLINIC | Facility: CLINIC | Age: 78
End: 2023-01-07

## 2023-01-07 DIAGNOSIS — M79.10 MUSCLE PAIN: ICD-10-CM

## 2023-01-08 DIAGNOSIS — M79.10 MUSCLE PAIN: ICD-10-CM

## 2023-01-09 RX ORDER — NAPROXEN 500 MG/1
TABLET ORAL
Qty: 60 TABLET | Refills: 0 | Status: SHIPPED | OUTPATIENT
Start: 2023-01-09

## 2023-01-09 RX ORDER — NAPROXEN 500 MG/1
500 TABLET ORAL 2 TIMES DAILY WITH MEALS
Qty: 60 TABLET | Refills: 0 | Status: SHIPPED | OUTPATIENT
Start: 2023-01-09 | End: 2023-02-08

## 2023-01-09 NOTE — TELEPHONE ENCOUNTER
VM - pt changed her mind, wants Naproxen instead of Meloxicam. Order pended, wanted to make sure safe - please sign for the pt if ok.  ty

## 2023-01-09 NOTE — TELEPHONE ENCOUNTER
From: Sean Mendez  To: Flip Urena MD  Sent: 1/1/2023 4:54 PM CST  Subject: Prescription    Can you call me in a prescription for the pain in my lower back I think I pull a muscle again bending.  It hurts when I get up or bend down

## 2023-01-10 ENCOUNTER — PATIENT MESSAGE (OUTPATIENT)
Dept: SURGERY | Facility: CLINIC | Age: 78
End: 2023-01-10

## 2023-01-10 NOTE — TELEPHONE ENCOUNTER
Patient has not been seen since 12/2021. She needs a follow up and eval with an available PA, please advise, thank you.

## 2023-01-10 NOTE — TELEPHONE ENCOUNTER
Pt sent a [facility] message and reported that she is still experiencing pain in her lower back and hip. Pt states she had been in pain for 2 days and it is difficult for her to walk.     Pt is requesting a pain management referral.    Pt LOV 12/30/2021 with Dr. Marge Doherty:    aJyla Whitt and PLAN:  78-year-old female with right arm tingling which is resolved  She is doing well  She has no signs of myelopathy  We discussed signs and symptoms of myelopathy  She can follow-up as needed   she will continue her home exercises  Patient very appreciative\"    Routed to the provider for review and feedback

## 2023-01-10 NOTE — TELEPHONE ENCOUNTER
From: Benjamin Abreu  To: Donita Flores PA-C  Sent: 1/10/2023 8:51 AM CST  Subject: Pain     I have pain in my lower back into my hip. . I'm taking Naproxen and using the heating pad. I didn't have this problem but you told me due to my spinal stenosis diagnose I might eventually have problem walking. You mention the next step would be pain management. How do I sign up for pain management.  I've been in pain 2 days and its difficult to walk

## 2023-01-11 ENCOUNTER — TELEPHONE (OUTPATIENT)
Dept: SURGERY | Facility: CLINIC | Age: 78
End: 2023-01-11

## 2023-01-11 NOTE — TELEPHONE ENCOUNTER
LVM for Pt to schedule an appt with Karen on 1/17/23. Available times were 8:15, 2:24, 3:15 and 3:45 when I left the message. Requested call back.

## 2023-01-30 RX ORDER — LEVOCETIRIZINE DIHYDROCHLORIDE 5 MG/1
TABLET, FILM COATED ORAL
Qty: 90 TABLET | Refills: 0 | Status: SHIPPED | OUTPATIENT
Start: 2023-01-30

## 2023-02-13 RX ORDER — FAMOTIDINE 40 MG/1
TABLET, FILM COATED ORAL
Qty: 90 TABLET | Refills: 3 | Status: SHIPPED | OUTPATIENT
Start: 2023-02-13

## 2023-02-13 NOTE — TELEPHONE ENCOUNTER
Protocol passed     Requesting: famotidine 40mg     LOV: 11/21/22   RTC: 6 months   Filled: 10/20/22 # 90 0 refills   Recent Labs: 12/6/22     Upcoming OV: none scheduled

## 2023-03-25 RX ORDER — PANTOPRAZOLE SODIUM 40 MG/1
TABLET, DELAYED RELEASE ORAL
Qty: 90 TABLET | Refills: 3 | Status: SHIPPED | OUTPATIENT
Start: 2023-03-25

## 2023-03-25 NOTE — TELEPHONE ENCOUNTER
No Protocol     Requesting: pantoprazole 40mg     LOV: 11/21/22   RTC: 6 months   Filled: 4/21/22 # 90 3 refills   Recent Labs: 12/6/22     Upcoming OV: none scheduled

## 2023-04-10 NOTE — TELEPHONE ENCOUNTER
No Protocol     Requesting: temazepam 15mg     LOV: 11/21/22   RTC: 6 months   Filled: 10/11/22 # 30 0 refills   Recent Labs: 12/6/22     Upcoming OV: none scheduled

## 2023-04-11 RX ORDER — TEMAZEPAM 15 MG/1
CAPSULE ORAL
Qty: 30 CAPSULE | Refills: 0 | Status: SHIPPED | OUTPATIENT
Start: 2023-04-11

## 2023-04-26 ENCOUNTER — OFFICE VISIT (OUTPATIENT)
Dept: INTERNAL MEDICINE CLINIC | Facility: CLINIC | Age: 78
End: 2023-04-26
Payer: COMMERCIAL

## 2023-04-26 VITALS
HEIGHT: 64 IN | HEART RATE: 60 BPM | OXYGEN SATURATION: 97 % | DIASTOLIC BLOOD PRESSURE: 78 MMHG | RESPIRATION RATE: 18 BRPM | SYSTOLIC BLOOD PRESSURE: 118 MMHG | BODY MASS INDEX: 45.87 KG/M2 | WEIGHT: 268.69 LBS

## 2023-04-26 DIAGNOSIS — E66.01 CLASS 3 SEVERE OBESITY WITH SERIOUS COMORBIDITY AND BODY MASS INDEX (BMI) OF 45.0 TO 49.9 IN ADULT, UNSPECIFIED OBESITY TYPE (HCC): ICD-10-CM

## 2023-04-26 DIAGNOSIS — G47.33 OBSTRUCTIVE SLEEP APNEA: Chronic | ICD-10-CM

## 2023-04-26 DIAGNOSIS — K21.9 GASTROESOPHAGEAL REFLUX DISEASE, UNSPECIFIED WHETHER ESOPHAGITIS PRESENT: ICD-10-CM

## 2023-04-26 DIAGNOSIS — I10 ESSENTIAL HYPERTENSION, BENIGN: Chronic | ICD-10-CM

## 2023-04-26 DIAGNOSIS — Z51.81 ENCOUNTER FOR THERAPEUTIC DRUG MONITORING: Primary | ICD-10-CM

## 2023-04-26 DIAGNOSIS — M17.0 OSTEOARTHRITIS OF BOTH KNEES, UNSPECIFIED OSTEOARTHRITIS TYPE: ICD-10-CM

## 2023-04-26 PROBLEM — E66.813 CLASS 3 SEVERE OBESITY WITH SERIOUS COMORBIDITY AND BODY MASS INDEX (BMI) OF 45.0 TO 49.9 IN ADULT: Status: ACTIVE | Noted: 2020-10-22

## 2023-04-26 PROBLEM — E66.813 CLASS 3 SEVERE OBESITY WITH SERIOUS COMORBIDITY AND BODY MASS INDEX (BMI) OF 45.0 TO 49.9 IN ADULT (HCC): Status: ACTIVE | Noted: 2020-10-22

## 2023-04-26 PROCEDURE — 3074F SYST BP LT 130 MM HG: CPT | Performed by: NURSE PRACTITIONER

## 2023-04-26 PROCEDURE — 3078F DIAST BP <80 MM HG: CPT | Performed by: NURSE PRACTITIONER

## 2023-04-26 PROCEDURE — 1126F AMNT PAIN NOTED NONE PRSNT: CPT | Performed by: NURSE PRACTITIONER

## 2023-04-26 PROCEDURE — 3008F BODY MASS INDEX DOCD: CPT | Performed by: NURSE PRACTITIONER

## 2023-04-26 PROCEDURE — 99204 OFFICE O/P NEW MOD 45 MIN: CPT | Performed by: NURSE PRACTITIONER

## 2023-04-26 RX ORDER — TRIAMTERENE AND HYDROCHLOROTHIAZIDE 37.5; 25 MG/1; MG/1
CAPSULE ORAL
COMMUNITY
Start: 2023-02-09

## 2023-04-26 RX ORDER — METFORMIN HYDROCHLORIDE 750 MG/1
1500 TABLET, EXTENDED RELEASE ORAL
Qty: 60 TABLET | Refills: 2 | Status: SHIPPED | OUTPATIENT
Start: 2023-04-26

## 2023-04-26 NOTE — PATIENT INSTRUCTIONS
Welcome to the [facility]...your Lifestyle Renovation begins now! Thank you for placing your trust in our health care team, I look forward to working with you along this journey to better health! Next steps:     1. Call our office at [phone] to schedule a personal nutrition consultation with one of our registered dieticians, Pearl Silva or Nasrin. Bring along your food journal (3 days minimum). See journal options below. 2.  Fill your prescribed medication and take as discussed and prescribed: Start Metformin ER as directed. 3.  Recommend Jump Start Your Health lifestyle intervention program via video or in person through [facility].  4.  Restart using your CPAP at night. Please try to work on the following dietary changes this first month:    1. Drink water with meals and throughout the day, cut down on soda and/or juice if consumed. Consider flavored water options like Bubbly, Spindrift, Hint and Carmen. 2.  Eat breakfast daily and focus on having protein with each meal, examples include: greek yogurt, cottage cheese, hard boiled egg, whole grain toast with peanut butter. Daily protein recommendation to start: 75 grams. 3.  Reduce refined carbohydrates and sugars which includes items such as sweets, as well as rice, pasta, and bread and make sure to choose whole grain options when having them with just 1 serving per meal about the size of your inner palm. Daily carbohydrate recommendation to start: 100 grams of net carbs per day. 4.  Consume non starchy veggies daily working towards making them a good 50% of your daily food intake. Add them to lunch and dinner consistently. 5.  Start a daily probiotic: VSL#3 is recommended, (order on line at www.vsl3. com). Take 1 capsule daily with water for 30 days, then reduce to 1 every other day (this will reduce the cost). Capsules can be left out for 2 weeks, but then must be refrigerated.      Please download cristopher My Fitness Sanjana Richardson! Or Net Diary to monitor daily dietary intake and you will be able to see if you are eating the right amount of calories or too much or too little which would hinder weight loss. Additionally this will help to see your daily carbohydrate and protein intake. When you set the nila up choose 1-2 lbs/week as a goal.  Keeping a paper food journal is an option as well to remain accountable for your choices- this is the start to mindful eating! A low calorie diet has been consistently shown to support weight loss. Continue or start exercising to help establish a routine. If not already exercising begin with 1 day and progress as able with long-term goal of 30 minutes 5 days a week at a minimum. Meditation daily can help manage and control stress. Chronic stress can make weight loss difficult. Exercising is one way to help with stress, but meditation using the CALM Nila or another comparable alternative can be done in your home or place of work with little time commitment. This Nila can also help work on behavior change and improve sleep. Check out the segment under Calm Masterclass and listen to The 4 Pillars of Health. A great way to begin learning about the foundation of lifestyle with practical tips to use in your every day. Check out www.yourweightmatters. org blog for continued daily support and education along this weight loss journey! Patient Resources:    Personal Training/Fitness Classes/Health Coaching    [place] TOPHER CONNOLLY and Marcos Sweet @ [email or web address]-reyes.dea/ Full fitness center with group fitness and personal training located in [facility] available when client of [facility]. Health Coaching with Radhika Stokes, Miguel Walton, and Karina Valdez at our [employer]- individual coaching to work on your health goals. Call [phone] and/or email @ [email or web address].  Free 60 minute consult when client of [employer] [facility]. 360FIT Monisha @ https://www.optionsXpress.com/. A variety of group fitness options plus various yoga classes [phone] and/or email Hector Bell at Elizabeth@Initiative Gaming. com  [address] [facility] with multiple locations: Aetna (www.ImpactRxtheory. Unspun Consulting Group), F45 Training (www.Quisk), Nengtong Science and Technology Body Bootcamp (www.Chairish.Unspun Consulting Group), Tango (www.Favista Real Estate), The Exercise  (www.exercisecoach.com), Club PilBlack Rhino Group (www.Kogeto)    Online Fitness  Fitness  on [employer] in 10 DVD series   www. lbaut99LPC. [employer]ing Group  Chair exercises via Sit and Be Fit (www.sitandbefit.org) and [address] (www.growyoungfitness. com) or Kanika Beach or Georgiana Gross videos on YouTube. Hip Hop Fit with Kenan Chung at www.hiphopfitFriendFinder Networks    Apps for on the Vantage Analytics 7 Minute Workout (orange box with white 7) - free on the go HIIT training nila  Peloton Nila @ wwwFamilybuilder    Nutrition Trackers and Tools  LoseIT! And My Fitness Pal apps and on line for tracking nutrition  NOOM - virtual health coaching  FitFoundation (healthy meals on the go) in [facility] @ www. linajpsullnnh2n. Katina Valente MD @ www.Krowder and Arely Tracy (calorie smart and low carb plans recommended) @ www. XIIAJA11.IYB, Metabolic Meals @ www. MyMetabolicMeals. com - individual prepared meals to go  [employer], TapInfluence, [employer], Every Plate, Packetmotion- on line meal delivery programs for preparation at home  [place] vozero in [place] for homemade meals to go @ www.mealvillage. com  Diet Doctor @ www. dietdoctor. com - low carb swaps  YuThinkSuit - meal prep and planning nila (www.yummly. com)    Stress Management/Behavior/Mindful Eating  CALM meditation nila (www.calm. Unspun Consulting Group)  Headspace  Am I Hungry? Mindful eating virtual  nila (www.Alta Analog)  Www.yourweightmatters. org - Obesity Action Coalition sponsored Blog posts daily  Motivation nila (black box with white \")- daily supportive messages sent to your phone    Books/Video Education/Podcasts  Mindless Eating by Sara Wellington  Why We Get Sick by Nicanor Grant (a book about insulin resistance)  Atomic Habits by Renée Reeves (a book about taking small steps to promote greater behavior change)   Can't Hurt Me by Dian Torrez (a book exploring the power of discipline in achieving your goals)  The End of Dieting: How to Live for Life by Dr. Hector Izaguirre M.D. or listen to The [address] [facility] Episode 61: Understanding \"Nutritarian\" Eating w/Dr. Hector Izaguirre  Your Body in Balance: The Smart Ecosystems of Food, Hormones, and Health by Dr. Nazia De Paz  The Menopause Diet Plan by Blaine Alcazar and [facility]  The Complete Guide to fasting by Dr. Cristina Kauffman, [address] [facility] by Bob Mauro, Ph.D, R.D. Weight Loss Surgery Will Not Treat Food Addiction by Kirk Matias Ph.D  The [address] [place] [address] on plant based nutrition  Fed Up - documentary about obesity (Free on [facility])  The Truth About Sugar - documentary on sugar (Free on Propers, https://youRelationship Scienceu. be/3F2dujpCH9h)  The Dr. Rafi Allen by Dr. Stevie Maurer MD  Fitlosophy Fitspiration - journal to better health (found at [employer] in fitness aisle)  What Happened to You?- a look at the impact trauma has on behavior written by Parish Stewart and Dr. Rae Rolling Again by Rehka Figueroa - discovering your true self after trauma  Sean Platt talk on Mind FactoryAR, The Call to PlayFirstage  Podcasts: The Exam Room by the Physician's Committee, Nutrition Facts by Dr. Trev Farrell, Simple Steps to Healthier More Balanced Living with certified health and  Gladys Hough.

## 2023-06-01 DIAGNOSIS — N63.10 MASS OF RIGHT BREAST, UNSPECIFIED QUADRANT: Primary | ICD-10-CM

## 2023-06-01 DIAGNOSIS — N60.91 ATYPICAL LOBULAR HYPERPLASIA (ALH) OF RIGHT BREAST: ICD-10-CM

## 2023-06-14 ENCOUNTER — HOSPITAL ENCOUNTER (OUTPATIENT)
Dept: MAMMOGRAPHY | Facility: HOSPITAL | Age: 78
Discharge: HOME | End: 2023-06-14
Attending: SURGERY
Payer: MEDICARE

## 2023-06-14 DIAGNOSIS — N63.10 MASS OF RIGHT BREAST, UNSPECIFIED QUADRANT: ICD-10-CM

## 2023-06-14 DIAGNOSIS — N60.91 ATYPICAL LOBULAR HYPERPLASIA (ALH) OF RIGHT BREAST: ICD-10-CM

## 2023-06-14 PROCEDURE — 77065 DX MAMMO INCL CAD UNI: CPT | Performed by: SURGERY

## 2023-06-14 PROCEDURE — 77061 BREAST TOMOSYNTHESIS UNI: CPT | Performed by: SURGERY

## 2023-06-14 PROCEDURE — 76642 ULTRASOUND BREAST LIMITED: CPT | Performed by: SURGERY

## 2023-06-21 ENCOUNTER — TELEPHONE (OUTPATIENT)
Dept: INTERNAL MEDICINE CLINIC | Facility: CLINIC | Age: 78
End: 2023-06-21

## 2023-06-21 NOTE — TELEPHONE ENCOUNTER
6/21/23: Spoke to pt on phone regarding RD referral; provided call back number when pt is able to make appt.

## 2023-06-27 RX ORDER — TRIAMTERENE AND HYDROCHLOROTHIAZIDE 37.5; 25 MG/1; MG/1
1 CAPSULE ORAL EVERY MORNING
Qty: 90 CAPSULE | Refills: 3 | Status: SHIPPED | OUTPATIENT
Start: 2023-06-27

## 2023-07-16 DIAGNOSIS — I10 ESSENTIAL HYPERTENSION, BENIGN: ICD-10-CM

## 2023-07-20 RX ORDER — AMLODIPINE BESYLATE 5 MG/1
5 TABLET ORAL 2 TIMES DAILY
Qty: 200 TABLET | Refills: 2 | OUTPATIENT
Start: 2023-07-20

## 2023-07-20 NOTE — TELEPHONE ENCOUNTER
amLODIPine Besylate 5 MG Oral Tablet          Will file in chart as: AMLODIPINE 5 MG Oral Tab    Sig: TAKE 1 TABLET BY MOUTH  TWICE DAILY    Disp: 200 tablet    Refills: 2    Start: 7/16/2023    Class: Normal    Non-formulary For: Essential hypertension, benign    To pharmacy: Requesting 1 year supply    Last ordered: 9 months ago by Tanisha Urena MD Last refill: 5/12/2023    Rx #: [id number]    Hypertension Medications Protocol Rwylfd3507/16/2023 09:24 PM   Protocol Details CMP or BMP in past 12 months    Last serum creatinine< 2.0    Appointment in past 6 or next 3 months        LOV 11/21/22  RTC 6 months    Filled 10/20/22 180 tabs 3 refills     Future Appointments   Date Time Provider Roberta Tavaresi   8/15/2023 10:00 AM Leyla Short MD [facility] [place]     1 year supply sent last year

## 2023-08-02 DIAGNOSIS — I10 ESSENTIAL HYPERTENSION, BENIGN: ICD-10-CM

## 2023-08-03 RX ORDER — AMLODIPINE BESYLATE 5 MG/1
5 TABLET ORAL 2 TIMES DAILY
Qty: 180 TABLET | Refills: 0 | Status: SHIPPED | OUTPATIENT
Start: 2023-08-03

## 2023-08-03 NOTE — TELEPHONE ENCOUNTER
LOV: 11/21/22   RTC: 6 months   Filled: 10/20/22 # 180 3 refills   :Labs: 12/6/22   Future Appointments   Date Time Provider Roberta Katherin   8/15/2023 10:00 AM Junior Kim MD [facility] [place]

## 2023-08-15 ENCOUNTER — OFFICE VISIT (OUTPATIENT)
Dept: INTERNAL MEDICINE CLINIC | Facility: CLINIC | Age: 78
End: 2023-08-15
Payer: COMMERCIAL

## 2023-08-15 VITALS
BODY MASS INDEX: 47.1 KG/M2 | RESPIRATION RATE: 16 BRPM | HEART RATE: 65 BPM | SYSTOLIC BLOOD PRESSURE: 142 MMHG | WEIGHT: 265.81 LBS | DIASTOLIC BLOOD PRESSURE: 78 MMHG | HEIGHT: 63 IN | OXYGEN SATURATION: 99 % | TEMPERATURE: 98 F

## 2023-08-15 DIAGNOSIS — E66.01 CLASS 3 SEVERE OBESITY DUE TO EXCESS CALORIES WITH SERIOUS COMORBIDITY AND BODY MASS INDEX (BMI) OF 45.0 TO 49.9 IN ADULT (HCC): ICD-10-CM

## 2023-08-15 DIAGNOSIS — E78.00 PURE HYPERCHOLESTEROLEMIA: Chronic | ICD-10-CM

## 2023-08-15 DIAGNOSIS — Z00.00 ROUTINE GENERAL MEDICAL EXAMINATION AT A HEALTH CARE FACILITY: Primary | ICD-10-CM

## 2023-08-15 DIAGNOSIS — G47.33 OBSTRUCTIVE SLEEP APNEA: Chronic | ICD-10-CM

## 2023-08-15 DIAGNOSIS — I10 ESSENTIAL HYPERTENSION, BENIGN: Chronic | ICD-10-CM

## 2023-08-15 PROBLEM — Z51.81 ENCOUNTER FOR THERAPEUTIC DRUG MONITORING: Status: RESOLVED | Noted: 2023-04-26 | Resolved: 2023-08-15

## 2023-08-15 PROCEDURE — 3078F DIAST BP <80 MM HG: CPT | Performed by: INTERNAL MEDICINE

## 2023-08-15 PROCEDURE — 1126F AMNT PAIN NOTED NONE PRSNT: CPT | Performed by: INTERNAL MEDICINE

## 2023-08-15 PROCEDURE — 96160 PT-FOCUSED HLTH RISK ASSMT: CPT | Performed by: INTERNAL MEDICINE

## 2023-08-15 PROCEDURE — 3008F BODY MASS INDEX DOCD: CPT | Performed by: INTERNAL MEDICINE

## 2023-08-15 PROCEDURE — G0439 PPPS, SUBSEQ VISIT: HCPCS | Performed by: INTERNAL MEDICINE

## 2023-08-15 PROCEDURE — 1170F FXNL STATUS ASSESSED: CPT | Performed by: INTERNAL MEDICINE

## 2023-08-15 PROCEDURE — 3077F SYST BP >= 140 MM HG: CPT | Performed by: INTERNAL MEDICINE

## 2023-08-15 PROCEDURE — 1159F MED LIST DOCD IN RCRD: CPT | Performed by: INTERNAL MEDICINE

## 2023-08-15 RX ORDER — LEVOCETIRIZINE DIHYDROCHLORIDE 5 MG/1
5 TABLET, FILM COATED ORAL EVERY EVENING
Qty: 90 TABLET | Refills: 3 | Status: SHIPPED | OUTPATIENT
Start: 2023-08-15 | End: 2024-08-14

## 2023-08-18 ENCOUNTER — LAB ENCOUNTER (OUTPATIENT)
Dept: LAB | Age: 78
End: 2023-08-18
Attending: INTERNAL MEDICINE
Payer: MEDICARE

## 2023-08-18 DIAGNOSIS — E78.00 PURE HYPERCHOLESTEROLEMIA: Chronic | ICD-10-CM

## 2023-08-18 DIAGNOSIS — Z00.00 ROUTINE GENERAL MEDICAL EXAMINATION AT A HEALTH CARE FACILITY: ICD-10-CM

## 2023-08-18 LAB
ALBUMIN SERPL-MCNC: 3.7 G/DL (ref 3.4–5)
ALBUMIN/GLOB SERPL: 1 {RATIO} (ref 1–2)
ALP LIVER SERPL-CCNC: 60 U/L
ALT SERPL-CCNC: 22 U/L
ANION GAP SERPL CALC-SCNC: 2 MMOL/L (ref 0–18)
AST SERPL-CCNC: 18 U/L (ref 15–37)
BASOPHILS # BLD AUTO: 0.04 X10(3) UL (ref 0–0.2)
BASOPHILS NFR BLD AUTO: 0.8 %
BILIRUB SERPL-MCNC: 0.7 MG/DL (ref 0.1–2)
BILIRUB UR QL STRIP.AUTO: NEGATIVE
BUN BLD-MCNC: 16 MG/DL (ref 7–18)
CALCIUM BLD-MCNC: 9.9 MG/DL (ref 8.5–10.1)
CHLORIDE SERPL-SCNC: 109 MMOL/L (ref 98–112)
CHOLEST SERPL-MCNC: 198 MG/DL (ref ?–200)
CLARITY UR REFRACT.AUTO: CLEAR
CO2 SERPL-SCNC: 30 MMOL/L (ref 21–32)
CREAT BLD-MCNC: 0.94 MG/DL
EGFRCR SERPLBLD CKD-EPI 2021: 62 ML/MIN/1.73M2 (ref 60–?)
EOSINOPHIL # BLD AUTO: 0.14 X10(3) UL (ref 0–0.7)
EOSINOPHIL NFR BLD AUTO: 2.8 %
ERYTHROCYTE [DISTWIDTH] IN BLOOD BY AUTOMATED COUNT: 14.9 %
EST. AVERAGE GLUCOSE BLD GHB EST-MCNC: 120 MG/DL (ref 68–126)
FASTING PATIENT LIPID ANSWER: YES
FASTING STATUS PATIENT QL REPORTED: YES
GLOBULIN PLAS-MCNC: 3.6 G/DL (ref 2.8–4.4)
GLUCOSE BLD-MCNC: 93 MG/DL (ref 70–99)
GLUCOSE UR STRIP.AUTO-MCNC: NORMAL MG/DL
HBA1C MFR BLD: 5.8 % (ref ?–5.7)
HCT VFR BLD AUTO: 43.4 %
HDLC SERPL-MCNC: 60 MG/DL (ref 40–59)
HGB BLD-MCNC: 14.2 G/DL
IMM GRANULOCYTES # BLD AUTO: 0.01 X10(3) UL (ref 0–1)
IMM GRANULOCYTES NFR BLD: 0.2 %
KETONES UR STRIP.AUTO-MCNC: NEGATIVE MG/DL
LDLC SERPL CALC-MCNC: 125 MG/DL (ref ?–100)
LEUKOCYTE ESTERASE UR QL STRIP.AUTO: 250
LYMPHOCYTES # BLD AUTO: 2.25 X10(3) UL (ref 1–4)
LYMPHOCYTES NFR BLD AUTO: 44.5 %
MCH RBC QN AUTO: 26.8 PG (ref 26–34)
MCHC RBC AUTO-ENTMCNC: 32.7 G/DL (ref 31–37)
MCV RBC AUTO: 82 FL
MONOCYTES # BLD AUTO: 0.39 X10(3) UL (ref 0.1–1)
MONOCYTES NFR BLD AUTO: 7.7 %
NEUTROPHILS # BLD AUTO: 2.23 X10 (3) UL (ref 1.5–7.7)
NEUTROPHILS # BLD AUTO: 2.23 X10(3) UL (ref 1.5–7.7)
NEUTROPHILS NFR BLD AUTO: 44 %
NITRITE UR QL STRIP.AUTO: NEGATIVE
NONHDLC SERPL-MCNC: 138 MG/DL (ref ?–130)
OSMOLALITY SERPL CALC.SUM OF ELEC: 293 MOSM/KG (ref 275–295)
PH UR STRIP.AUTO: 5.5 [PH] (ref 5–8)
PLATELET # BLD AUTO: 182 10(3)UL (ref 150–450)
POTASSIUM SERPL-SCNC: 3.9 MMOL/L (ref 3.5–5.1)
PROT SERPL-MCNC: 7.3 G/DL (ref 6.4–8.2)
PROT UR STRIP.AUTO-MCNC: NEGATIVE MG/DL
RBC # BLD AUTO: 5.29 X10(6)UL
RBC UR QL AUTO: NEGATIVE
SODIUM SERPL-SCNC: 141 MMOL/L (ref 136–145)
SP GR UR STRIP.AUTO: 1.02 (ref 1–1.03)
T4 SERPL-MCNC: 9.9 UG/DL
TRIGL SERPL-MCNC: 73 MG/DL (ref 30–149)
TSI SER-ACNC: 2.09 MIU/ML (ref 0.36–3.74)
UROBILINOGEN UR STRIP.AUTO-MCNC: NORMAL MG/DL
VIT D+METAB SERPL-MCNC: 48.2 NG/ML (ref 30–100)
VLDLC SERPL CALC-MCNC: 13 MG/DL (ref 0–30)
WBC # BLD AUTO: 5.1 X10(3) UL (ref 4–11)

## 2023-08-18 PROCEDURE — 84436 ASSAY OF TOTAL THYROXINE: CPT

## 2023-08-18 PROCEDURE — 82306 VITAMIN D 25 HYDROXY: CPT

## 2023-08-18 PROCEDURE — 80053 COMPREHEN METABOLIC PANEL: CPT

## 2023-08-18 PROCEDURE — 81001 URINALYSIS AUTO W/SCOPE: CPT

## 2023-08-18 PROCEDURE — 84443 ASSAY THYROID STIM HORMONE: CPT

## 2023-08-18 PROCEDURE — 83036 HEMOGLOBIN GLYCOSYLATED A1C: CPT

## 2023-08-18 PROCEDURE — 80061 LIPID PANEL: CPT

## 2023-08-18 PROCEDURE — 85025 COMPLETE CBC W/AUTO DIFF WBC: CPT

## 2023-08-23 DIAGNOSIS — R82.90 URINE FINDINGS ABNORMAL: Primary | ICD-10-CM

## 2023-08-25 ENCOUNTER — LAB ENCOUNTER (OUTPATIENT)
Dept: LAB | Age: 78
End: 2023-08-25
Attending: INTERNAL MEDICINE
Payer: MEDICARE

## 2023-08-25 DIAGNOSIS — R82.90 URINE FINDINGS ABNORMAL: ICD-10-CM

## 2023-08-25 LAB
BILIRUB UR QL STRIP.AUTO: NEGATIVE
CLARITY UR REFRACT.AUTO: CLEAR
COLOR UR AUTO: YELLOW
GLUCOSE UR STRIP.AUTO-MCNC: NORMAL MG/DL
KETONES UR STRIP.AUTO-MCNC: NEGATIVE MG/DL
LEUKOCYTE ESTERASE UR QL STRIP.AUTO: NEGATIVE
NITRITE UR QL STRIP.AUTO: NEGATIVE
PH UR STRIP.AUTO: 6 [PH] (ref 5–8)
PROT UR STRIP.AUTO-MCNC: NEGATIVE MG/DL
RBC UR QL AUTO: NEGATIVE
SP GR UR STRIP.AUTO: 1.01 (ref 1–1.03)
UROBILINOGEN UR STRIP.AUTO-MCNC: NORMAL MG/DL

## 2023-08-25 PROCEDURE — 87086 URINE CULTURE/COLONY COUNT: CPT

## 2023-08-25 PROCEDURE — 81003 URINALYSIS AUTO W/O SCOPE: CPT

## 2023-09-24 ENCOUNTER — PATIENT MESSAGE (OUTPATIENT)
Dept: INTERNAL MEDICINE CLINIC | Facility: CLINIC | Age: 78
End: 2023-09-24

## 2023-09-24 DIAGNOSIS — R92.8 ABNORMAL MAMMOGRAM: ICD-10-CM

## 2023-09-24 DIAGNOSIS — R25.2 MUSCLE CRAMP: Primary | ICD-10-CM

## 2023-09-25 NOTE — TELEPHONE ENCOUNTER
Diagnostic mammo order placed w/expected date of 12/14/23. CONCLUSION:       BI-RADS CATEGORY:    DIAGNOSTIC CATEGORY 3--PROBABLY BENIGN FINDING. RECOMMENDATIONS:    SHORT TERM FOLLOW-UP DIAGNOSTIC MAMMOGRAM BILATERAL BREASTS IN 6 MONTHS. A letter explaining the results in lay terms has been sent to the patient. This exam was evaluated with a computer-aided device.   This patient's information has been entered into a reminder system with a target due date for the next mammogram.      LOCATION:  THE [facility]                Dictated by (CST): Inge Alonzo MD on 6/14/2023 at 1:08 PM       Finalized by (CST): Inge Alonzo MD on 6/14/2023 at 1:26 PM

## 2023-09-25 NOTE — TELEPHONE ENCOUNTER
From: Kaiser Caldwell  To: Kiki Montiel  Sent: 9/24/2023 1:39 PM CDT  Subject: Mammogram    My I my last follow-up mammogram on my right breast with Dr Lex Alamo on 6/14/23. . I was told that I should have Dr. Milena Sandoval schedule my regular mammogram after 6 months . Adriano Nation Can you have him put in an order for my regular mammogram on both breast.. Thank you

## 2023-09-26 ENCOUNTER — PATIENT MESSAGE (OUTPATIENT)
Dept: INTERNAL MEDICINE CLINIC | Facility: CLINIC | Age: 78
End: 2023-09-26

## 2023-09-26 NOTE — TELEPHONE ENCOUNTER
From: Julia Mcrae  To: Vidal Arzate  Sent: 9/26/2023 3:09 PM CDT  Subject: Flu and Covid Vaccine    Can you update my records. Mason Archuleta I had my flu shot at [facility] on 9/23/23 and the new covid vaccine at [place] on 9/26/23

## 2023-09-27 DIAGNOSIS — I10 ESSENTIAL HYPERTENSION, BENIGN: ICD-10-CM

## 2023-09-28 RX ORDER — AMLODIPINE BESYLATE 5 MG/1
5 TABLET ORAL 2 TIMES DAILY
Qty: 180 TABLET | Refills: 3 | OUTPATIENT
Start: 2023-09-28

## 2023-09-28 NOTE — TELEPHONE ENCOUNTER
amLODIPine Besylate 5 MG Oral Tablet          Will file in chart as: AMLODIPINE 5 MG Oral Tab    Sig: TAKE 1 TABLET BY MOUTH  TWICE DAILY    Original sig: TAKE 1 TABLET BY MOUTH TWICE  DAILY    Disp: 180 tablet    Refills: 3    Start: 9/27/2023    Class: Normal    Non-formulary For: Essential hypertension, benign    To pharmacy: Please send a replace/new response with 100-Day Supply if appropriate to maximize member benefit. Requesting 1 year supply. Last ordered: 1 month ago (8/3/2023) by Marti Mac MD    Last refill: 8/3/2023    Rx #: [id number]    Hypertension Medications Protocol Lcacaf7209/27/2023 09:28 PM   Protocol Details CMP or BMP in past 12 months    Last serum creatinine< 2.0    Appointment in past 6 or next 3 months      LOV 8/15/23  RTC 6 months  Filled 8/3/23 180 tabs 0 refills   Last labs 8/18/23  No future appointments.

## 2023-10-09 DIAGNOSIS — I10 ESSENTIAL HYPERTENSION, BENIGN: ICD-10-CM

## 2023-10-10 RX ORDER — HYDRALAZINE HYDROCHLORIDE 25 MG/1
25 TABLET, FILM COATED ORAL 3 TIMES DAILY
Qty: 300 TABLET | Refills: 2 | Status: SHIPPED | OUTPATIENT
Start: 2023-10-10

## 2023-10-10 NOTE — TELEPHONE ENCOUNTER
Hydralazine 25mg     LOV: 8/15/23   RTC: 6 months   Filled: 10/20/22 # 270 3 refills   Labs: 8/18/23   Future Appointments   Date Time Provider Roberta Pandey   12/18/2023 12:40 PM [facility] [facility]

## 2023-11-20 RX ORDER — FAMOTIDINE 40 MG/1
40 TABLET, FILM COATED ORAL NIGHTLY PRN
Qty: 100 TABLET | Refills: 2 | Status: SHIPPED | OUTPATIENT
Start: 2023-11-20

## 2023-11-20 NOTE — TELEPHONE ENCOUNTER
Famotidine 40mg     LOV: 8/15/23   RTC: 6 months   Filled: 2/13/23 # 90 3 refills   Labs: 8/25/23  Future Appointments   Date Time Provider Roberta Pandey   12/18/2023 12:40 PM [facility] [address] [facility]

## 2023-12-11 RX ORDER — LEVOCETIRIZINE DIHYDROCHLORIDE 5 MG/1
5 TABLET, FILM COATED ORAL EVERY EVENING
Qty: 90 TABLET | Refills: 3 | OUTPATIENT
Start: 2023-12-11

## 2023-12-14 ENCOUNTER — PATIENT MESSAGE (OUTPATIENT)
Dept: INTERNAL MEDICINE CLINIC | Facility: CLINIC | Age: 78
End: 2023-12-14

## 2023-12-14 RX ORDER — LEVOCETIRIZINE DIHYDROCHLORIDE 5 MG/1
5 TABLET, FILM COATED ORAL EVERY EVENING
Qty: 90 TABLET | Refills: 3 | OUTPATIENT
Start: 2023-12-14 | End: 2024-12-13

## 2023-12-14 NOTE — TELEPHONE ENCOUNTER
From: Sabrina Client  To: Kanika Mayen  Sent: 12/14/2023 2:56 PM CST  Subject: Refill    I got the following message but [facility] does not have the refill. Where did you send it ? The following prescription(s) renewals have been denied:          - LEVOCETIRIZINE 5 MG Oral Tab [Pharmacy Med Name: LEVOCETIRIZINE 5MG TABLETS]     If we have received multiple requests for this medication renewal (either from you or the pharmacy) you may receive a denial message for any duplicates in addition to an approval message for your medication. We apologize for any confusion.      Please call or send a message to your doctor by clicking the \"Ask a Question\" button at the top of the screen for more information about this denial.

## 2023-12-16 ENCOUNTER — PATIENT MESSAGE (OUTPATIENT)
Dept: INTERNAL MEDICINE CLINIC | Facility: CLINIC | Age: 78
End: 2023-12-16

## 2023-12-17 RX ORDER — LEVOCETIRIZINE DIHYDROCHLORIDE 5 MG/1
5 TABLET, FILM COATED ORAL EVERY EVENING
Qty: 90 TABLET | Refills: 3 | Status: SHIPPED | OUTPATIENT
Start: 2023-12-17 | End: 2024-12-16

## 2023-12-18 ENCOUNTER — HOSPITAL ENCOUNTER (OUTPATIENT)
Dept: MAMMOGRAPHY | Facility: HOSPITAL | Age: 78
Discharge: HOME | End: 2023-12-18
Attending: INTERNAL MEDICINE
Payer: MEDICARE

## 2023-12-18 DIAGNOSIS — R92.8 ABNORMAL MAMMOGRAM: ICD-10-CM

## 2023-12-18 PROCEDURE — 77062 BREAST TOMOSYNTHESIS BI: CPT | Performed by: INTERNAL MEDICINE

## 2023-12-18 PROCEDURE — 77066 DX MAMMO INCL CAD BI: CPT | Performed by: INTERNAL MEDICINE

## 2023-12-18 PROCEDURE — 76642 ULTRASOUND BREAST LIMITED: CPT | Performed by: INTERNAL MEDICINE

## 2023-12-18 NOTE — TELEPHONE ENCOUNTER
From: Clay Arias  To: Yrn Richmond  Sent: 12/16/2023 11:56 AM CST  Subject: Prescription    I called [facility] they have not received a the prescription below. .They said they need a new prescription,    Refills have been requested for the following medications:      levocetirizine 5 MG Oral Tab [Abebe Martinez]     Preferred pharmacy: [facility] [address] [place] [address]  Via [address], [phone], [phone]

## 2024-01-02 RX ORDER — PANTOPRAZOLE SODIUM 40 MG/1
TABLET, DELAYED RELEASE ORAL
Qty: 100 TABLET | Refills: 2 | Status: SHIPPED | OUTPATIENT
Start: 2024-01-02

## 2024-01-02 NOTE — TELEPHONE ENCOUNTER
LOV: 8/15/23   RTC: 6 months   Filled: 3/25/23 # 90 3 refills   Labs: 8/18/23   No future appointments.

## 2024-04-05 RX ORDER — TRIAMTERENE AND HYDROCHLOROTHIAZIDE 37.5; 25 MG/1; MG/1
1 CAPSULE ORAL EVERY MORNING
Qty: 100 CAPSULE | Refills: 2 | Status: SHIPPED | OUTPATIENT
Start: 2024-04-05

## 2024-04-05 NOTE — TELEPHONE ENCOUNTER
Name from pharmacy: Triamterene-HCTZ 37.5-25 MG Oral Capsule         Will file in chart as: TRIAMTERENE-HYDROCHLOROTHIAZIDE 37.5-25 MG Oral Cap    Sig: TAKE 1 CAPSULE BY MOUTH IN THE  MORNING    Disp: 100 capsule    Refills: 2    Start: 4/4/2024    Class: Normal    Non-formulary    To pharmacy: Please send a replace/new response with 100-Day Supply if appropriate to maximize member benefit. Requesting 1 year supply.    Last ordered: 9 months ago (6/27/2023) by Abebe Martinez MD    Last refill: 1/30/2024    Rx #: [id number]    Hypertension Medications Protocol Usnrno4704/04/2024 09:24 PM   Protocol Details Last BP reading less than 140/90    CMP or BMP in past 12 months    In person appointment or virtual visit in the past 12 mos or appointment in next 3 mos    EGFRCR or GFRAA > 50      To be filled at: [address] [phone], [phone]

## 2024-05-22 DIAGNOSIS — I10 ESSENTIAL HYPERTENSION, BENIGN: ICD-10-CM

## 2024-05-23 RX ORDER — AMLODIPINE BESYLATE 5 MG/1
5 TABLET ORAL 2 TIMES DAILY
Qty: 180 TABLET | Refills: 3 | Status: SHIPPED | OUTPATIENT
Start: 2024-05-23

## 2024-05-23 NOTE — TELEPHONE ENCOUNTER
Name from pharmacy: amLODIPine Besylate 5 MG Oral Tablet          Will file in chart as: AMLODIPINE 5 MG Oral Tab    Sig: TAKE 1 TABLET BY MOUTH  TWICE DAILY    Original sig: TAKE 1 TABLET BY MOUTH TWICE  DAILY    Disp: 180 tablet    Refills: 3    Start: 5/22/2024    Class: Normal    Non-formulary For: Essential hypertension, benign    To pharmacy: Please send a replace/new response with 100-Day Supply if appropriate to maximize member benefit. Requesting 1 year supply.    Last ordered: 9 months ago (8/3/2023) by Abebe Martinez MD    Last refill: 8/3/2023    Rx #: [id number]    Hypertension Medications Protocol Cawxvy1305/22/2024 07:23 AM   Protocol Details Last BP reading less than 140/90    CMP or BMP in past 12 months    In person appointment or virtual visit in the past 12 mos or appointment in next 3 mos    EGFRCR or GFRAA > 50      To be filled at: [address] [phone], [phone]

## 2024-05-29 ENCOUNTER — PATIENT MESSAGE (OUTPATIENT)
Dept: INTERNAL MEDICINE CLINIC | Facility: CLINIC | Age: 79
End: 2024-05-29

## 2024-05-31 ENCOUNTER — PATIENT MESSAGE (OUTPATIENT)
Dept: INTERNAL MEDICINE CLINIC | Facility: CLINIC | Age: 79
End: 2024-05-31

## 2024-06-03 NOTE — TELEPHONE ENCOUNTER
From: Cierra Saenz  To: Abebe Martinez  Sent: 5/31/2024 5:17 PM CDT  Subject: Disable Placcarmelina Martinez, My knees have gotten so I can barely walk . I know you said I will need knee replacement at some point but I'm not ready for that surgery because of the rehab and someone to help me..I'm to the point I have to use a walker to get to my car. Can you please fill out the attached disabled placard and I will have someone pick it up.     Thank you  Cierra Saenz

## 2024-06-04 NOTE — TELEPHONE ENCOUNTER
Left placcard form in 's in-basket. Pt is aware VM is out of office. Whenever VM gets a chance, please notify RN when form is filled so RN can notify pt. TY!

## 2024-06-16 DIAGNOSIS — I10 ESSENTIAL HYPERTENSION, BENIGN: ICD-10-CM

## 2024-06-17 RX ORDER — HYDRALAZINE HYDROCHLORIDE 25 MG/1
25 TABLET, FILM COATED ORAL 3 TIMES DAILY
Qty: 300 TABLET | Refills: 2 | Status: SHIPPED | OUTPATIENT
Start: 2024-06-17

## 2024-06-17 NOTE — TELEPHONE ENCOUNTER
Protocol failed     Hydralazine 25mg     LOV: 8/15/23   RTC: 6 months   Filled; 10/10/23 # 300 2 refill   Labs: 8/18/23   Future Appointments   Date Time Provider Department Center   8/15/2024 11:00 AM Abebe Martinez MD [facility] [place]

## 2024-06-18 NOTE — TELEPHONE ENCOUNTER
Please call patient and let her know form is ready for , placed form in envelope and brought to front office.    Placed one copy to scan and one in the purple folder pod #2

## 2024-06-19 ENCOUNTER — PATIENT MESSAGE (OUTPATIENT)
Dept: INTERNAL MEDICINE CLINIC | Facility: CLINIC | Age: 79
End: 2024-06-19

## 2024-06-19 NOTE — TELEPHONE ENCOUNTER
Patient called to inform that placard form is missing information.  The Term of disability is not completed and needs to be in order for form to be valid.   Patient is requesting all of this be completed and sent through Invisible as patient does not have reliable transportation to come into office.   Please advise and send through Invisible if applicable.

## 2024-06-20 NOTE — TELEPHONE ENCOUNTER
From: Cierra Saenz  To: Abebe Martinez  Sent: 6/19/2024 1:10 PM CDT  Subject: Handicap Placard Card    I picked up the card today...Part 3 of the length of the placard card was not checked...Can Dr Martinez please check permanent. I you send it to me via my chart..    Thank you   Cierra Saenz

## 2024-06-21 ENCOUNTER — TELEPHONE (OUTPATIENT)
Dept: INTERNAL MEDICINE CLINIC | Facility: CLINIC | Age: 79
End: 2024-06-21

## 2024-07-25 ENCOUNTER — TELEPHONE (OUTPATIENT)
Dept: INTERNAL MEDICINE CLINIC | Facility: CLINIC | Age: 79
End: 2024-07-25

## 2024-07-26 NOTE — TELEPHONE ENCOUNTER
Famotidine 40mg     LOV: 8/15/23   RTC: 6 months   Filled: 11/20/23 #100 2 refills   Labs: 8/18/23   Future Appointments   Date Time Provider Department Center   8/15/2024 11:00 AM Abebe Martinez MD [facility] [place]

## 2024-07-27 NOTE — TELEPHONE ENCOUNTER
Need brief tel visit   Prior to refill  Why is she taking  both pepcid and pantoprazole   When did she last see dr mendez   When did she have her last upper endo  Also due for a cpx

## 2024-07-29 NOTE — TELEPHONE ENCOUNTER
Pt called back, she advised she does not recall asking for a refill.     Patient advises to wait until her CPX apt.     Future Appointments   Date Time Provider Department Center   8/15/2024 11:00 AM Abebe Martinez MD [facility] [place]

## 2024-07-30 RX ORDER — FAMOTIDINE 40 MG/1
40 TABLET, FILM COATED ORAL NIGHTLY PRN
Qty: 100 TABLET | Refills: 2 | OUTPATIENT
Start: 2024-07-30

## 2024-08-15 ENCOUNTER — TELEPHONE (OUTPATIENT)
Dept: INTERNAL MEDICINE CLINIC | Facility: CLINIC | Age: 79
End: 2024-08-15

## 2024-08-15 ENCOUNTER — OFFICE VISIT (OUTPATIENT)
Dept: INTERNAL MEDICINE CLINIC | Facility: CLINIC | Age: 79
End: 2024-08-15
Payer: COMMERCIAL

## 2024-08-15 VITALS
RESPIRATION RATE: 16 BRPM | HEIGHT: 63 IN | OXYGEN SATURATION: 99 % | BODY MASS INDEX: 47.48 KG/M2 | HEART RATE: 78 BPM | TEMPERATURE: 98 F | SYSTOLIC BLOOD PRESSURE: 126 MMHG | WEIGHT: 268 LBS | DIASTOLIC BLOOD PRESSURE: 60 MMHG

## 2024-08-15 DIAGNOSIS — M15.0 PRIMARY OSTEOARTHRITIS INVOLVING MULTIPLE JOINTS: ICD-10-CM

## 2024-08-15 DIAGNOSIS — I10 ESSENTIAL HYPERTENSION, BENIGN: Chronic | ICD-10-CM

## 2024-08-15 DIAGNOSIS — E78.00 PURE HYPERCHOLESTEROLEMIA: Chronic | ICD-10-CM

## 2024-08-15 DIAGNOSIS — G47.33 OBSTRUCTIVE SLEEP APNEA: Chronic | ICD-10-CM

## 2024-08-15 DIAGNOSIS — Z00.00 ROUTINE GENERAL MEDICAL EXAMINATION AT A HEALTH CARE FACILITY: Primary | ICD-10-CM

## 2024-08-15 DIAGNOSIS — E66.01 CLASS 3 SEVERE OBESITY DUE TO EXCESS CALORIES WITH SERIOUS COMORBIDITY AND BODY MASS INDEX (BMI) OF 45.0 TO 49.9 IN ADULT (HCC): Chronic | ICD-10-CM

## 2024-08-15 PROBLEM — E66.813 CLASS 3 SEVERE OBESITY WITH SERIOUS COMORBIDITY AND BODY MASS INDEX (BMI) OF 45.0 TO 49.9 IN ADULT: Chronic | Status: ACTIVE | Noted: 2020-10-22

## 2024-08-15 PROBLEM — E66.813 CLASS 3 SEVERE OBESITY WITH SERIOUS COMORBIDITY AND BODY MASS INDEX (BMI) OF 45.0 TO 49.9 IN ADULT (HCC): Chronic | Status: ACTIVE | Noted: 2020-10-22

## 2024-08-15 PROCEDURE — 1159F MED LIST DOCD IN RCRD: CPT | Performed by: INTERNAL MEDICINE

## 2024-08-15 PROCEDURE — 1170F FXNL STATUS ASSESSED: CPT | Performed by: INTERNAL MEDICINE

## 2024-08-15 PROCEDURE — 96160 PT-FOCUSED HLTH RISK ASSMT: CPT | Performed by: INTERNAL MEDICINE

## 2024-08-15 PROCEDURE — 1126F AMNT PAIN NOTED NONE PRSNT: CPT | Performed by: INTERNAL MEDICINE

## 2024-08-15 PROCEDURE — 3078F DIAST BP <80 MM HG: CPT | Performed by: INTERNAL MEDICINE

## 2024-08-15 PROCEDURE — 1160F RVW MEDS BY RX/DR IN RCRD: CPT | Performed by: INTERNAL MEDICINE

## 2024-08-15 PROCEDURE — 3074F SYST BP LT 130 MM HG: CPT | Performed by: INTERNAL MEDICINE

## 2024-08-15 PROCEDURE — G0439 PPPS, SUBSEQ VISIT: HCPCS | Performed by: INTERNAL MEDICINE

## 2024-08-15 PROCEDURE — 3008F BODY MASS INDEX DOCD: CPT | Performed by: INTERNAL MEDICINE

## 2024-08-15 RX ORDER — LIDOCAINE 4 G/G
1 PATCH TOPICAL EVERY 24 HOURS
Qty: 30 PATCH | Refills: 2 | Status: SHIPPED | OUTPATIENT
Start: 2024-08-15 | End: 2024-11-13

## 2024-08-15 RX ORDER — AMLODIPINE BESYLATE 5 MG/1
5 TABLET ORAL 2 TIMES DAILY
Qty: 180 TABLET | Refills: 3 | Status: SHIPPED | OUTPATIENT
Start: 2024-08-15 | End: 2025-08-15

## 2024-08-15 RX ORDER — FAMOTIDINE 40 MG/1
40 TABLET, FILM COATED ORAL NIGHTLY PRN
Qty: 100 TABLET | Refills: 2 | Status: SHIPPED | OUTPATIENT
Start: 2024-08-15 | End: 2024-08-15

## 2024-08-15 RX ORDER — LIDOCAINE 4 G/G
1 PATCH TOPICAL EVERY 24 HOURS
Qty: 30 PATCH | Refills: 2 | Status: SHIPPED | OUTPATIENT
Start: 2024-08-15 | End: 2024-08-15 | Stop reason: CLARIF

## 2024-08-15 NOTE — TELEPHONE ENCOUNTER
Patient called the office stating that she needs the lidocaine (HM LIDOCAINE PATCH) 4 % External Patch to be sent to the [facility] pharmacy on [facility] in [place] instead of her mail order pharmacy. Please advise.

## 2024-08-15 NOTE — PROGRESS NOTES
REASON FOR VISIT:    Cierra Saenz is a 79 year old female who presents for a MA Supervisit.  Female   Female      Patient Care Team: Patient Care Team:  Abebe Martinez MD as PCP - General (Internal Medicine)  Judy Olivas PT as Physical Therapist  Kai Solomon MD (NEUROSURGERY)  Chidi Espinosa MD (GASTROENTEROLOGY)    Patient Active Problem List   Diagnosis    Pure hypercholesterolemia    Essential hypertension, benign    Obstructive sleep apnea    Class 3 severe obesity with serious comorbidity and body mass index (BMI) of 45.0 to 49.9 in adult (HCC)    Primary osteoarthritis involving multiple joints     Current Outpatient Medications   Medication Sig Dispense Refill    lidocaine (HM LIDOCAINE PATCH) 4 % External Patch Place 1 patch onto the skin daily. 30 patch 2    amLODIPine 5 MG Oral Tab Take 1 tablet (5 mg total) by mouth 2 (two) times daily. 180 tablet 3    HYDRALAZINE 25 MG Oral Tab TAKE 1 TABLET BY MOUTH 3 TIMES  DAILY 300 tablet 2    TEMAZEPAM 15 MG Oral Cap TAKE 1 CAPSULE(15 MG) BY MOUTH EVERY NIGHT AS NEEDED FOR SLEEP 30 capsule 0    triamterene-hydroCHLOROthiazide 37.5-25 MG Oral Cap Take 1 capsule by mouth every morning. 100 capsule 2    levocetirizine 5 MG Oral Tab Take 1 tablet (5 mg total) by mouth every evening. 90 tablet 3    aspirin 81 MG Oral Tab Take 1 tablet (81 mg total) by mouth daily.      Melatonin 10 MG Oral Cap Take 1 tablet by mouth as needed.        omega-3 fatty acids (FISH OIL) 1000 MG Oral Cap Take 1,000 mg by mouth daily.      Calcium 1500 MG Oral Tab Take 1 tablet by mouth 2 (two) times daily with meals.             Latest Ref Rng & Units 8/18/2023     8:12 AM 12/6/2022     8:08 AM 10/21/2021     7:33 AM 3/24/2021     7:58 AM 10/9/2020     7:27 AM 8/24/2020     9:36 AM 11/5/2019     8:29 AM   Glucose and HbA1c   Glucose 70 - 99 mg/dL 93  93  91  90  84  87  91          Latest Ref Rng & Units 8/18/2023     8:12 AM 12/6/2022     8:08 AM 10/21/2021     7:33  AM 3/24/2021     7:58 AM 10/9/2020     7:27 AM 11/5/2019     8:29 AM 10/8/2018     8:13 AM   Cholesterol   Total Cholesterol <200 mg/dL 198  188  180  195  196  186  177    Triglycerides 30 - 149 mg/dL 73  56  65  68  62  77  54    HDL 40 - 59 mg/dL 60  63  56  70  63  58  61    LDL <100 mg/dL 125  114  112  111  121  113  105          Latest Ref Rng & Units 8/18/2023     8:12 AM 12/6/2022     8:08 AM 10/21/2021     7:33 AM 3/24/2021     7:58 AM 10/9/2020     7:27 AM 8/24/2020     9:36 AM 11/5/2019     8:29 AM   BUN and Cr   BUN 7 - 18 mg/dL 16  20  12  19  25  16  14    Creatinine 0.55 - 1.02 mg/dL 0.94  0.98  0.85  0.86  1.00  1.05  0.90          Latest Ref Rng & Units 8/18/2023     8:12 AM 12/6/2022     8:08 AM 10/21/2021     7:33 AM 10/9/2020     7:27 AM 11/5/2019     8:29 AM 10/8/2018     8:13 AM 6/25/2018     4:08 PM   AST and ALT   AST (SGOT) 15 - 37 U/L 18  15  14  18  14  17  18    ALT (SGPT) 13 - 56 U/L 22  20  18  23  17  23  20          Latest Ref Rng & Units 8/18/2023     8:12 AM 12/6/2022     8:08 AM 10/21/2021     7:33 AM 10/9/2020     7:27 AM 11/5/2019     8:29 AM 10/8/2018     8:13 AM   TSH and Free T4   TSH 0.358 - 3.740 mIU/mL 2.090  2.390  2.880  3.030  2.590  2.740         General Health      In the past six months, have you lost more than 10 pounds without trying?: 2 - No  Has your appetite been poor?: No  Type of Diet: Balanced  How does the patient maintain a good energy level?: Daily Walks  How would you describe your daily physical activity?: Light  How would you describe your current health state?: Good  How do you maintain positive mental well-being?: Social Interaction;Visiting Friends  Have you had any immunizations at another office such as Influenza, Hepatitis B, Tetanus, or Pneumococcal?: No     Functional Ability     Bathing or Showering: Able without help  Toileting: Able without help  Dressing: Able without help  Eating: Able without help  Driving: Able without help  Preparing  your meals: Able without help  Managing money/bills: Able without help  Taking medications as prescribed: Able without help  Are you able to afford your medications?: Yes  Hearing Problems?: No     Functional Status     Hearing Problems?: No  Vision Problems? : No  Difficulty walking?: Yes  Difficulty dressing or bathing?: No  Problems with daily activities? : No  Memory Problems?: No      Fall/Risk Assessment                 Depression Screening (PHQ-2/PHQ-9): Over the LAST 2 WEEKS            Advance Directives     Do you have a healthcare power of ?: Yes  Do you have a living will?: Yes     Cognitive Assessment     What day of the week is this?: Correct  What month is it?: Correct  What year is it?: Correct  Recall \"Ball\": Correct  Recall \"Flag\": Correct  Recall \"Tree\": Correct         PREVENTATIVE SERVICES   INDICATIONS AND SCHEDULE Internal Lab or Procedure   Diabetes Screening     HbgA1C   Annually HgbA1C (%)   Date Value   08/18/2023 5.8 (H)       Fasting Blood Sugar (FSB)Annually Glucose (mg/dL)   Date Value   08/18/2023 93      Cardiovascular Disease Screening    LDL Annually LDL Cholesterol (mg/dL)   Date Value   08/18/2023 125 (H)       EKG - w/ Initial Preventative Physical Exam only, or if medically necessary yes   Colorectal Cancer Screening     Colonoscopy Screen every 10 years Health Maintenance   Topic Date Due    Colorectal Cancer Screening  Discontinued       Flex Sigmoidoscopy Screen every 5 years No results found for this or any previous visit.    Fecal Occult Blood Annually No results found for: \"FOB\", \"OCCULTSTOOL\"   Glaucoma Screening     Ophthalmology Visit Annually yes   Immunizations     Zoster (Not covered by Medicare Part B) No orders found for this or any previous visit.     SPECIFIC DISEASE MONITORING Internal Lab or Procedure     Annual Monitoring of Persistent Medications  (ACE/ARB, Digoxin, Diuretics)        Potassium  Annually Potassium (mmol/L)   Date Value   08/18/2023 3.9        Creatinine  Annually Creatinine (mg/dL)   Date Value   2023 0.94       Digoxin Serum Conc  Annually No results found for: \"DIGOXIN\"          ALLERGIES:     Allergies   Allergen Reactions    Ace Inhibitors Coughing    Metformin DIARRHEA     MEDICAL INFORMATION:   Past Medical History:    Allergies    Arthritis    Arthritis of knee    Chest pain of uncertain etiology    Esophageal reflux    High blood pressure    Hyperlipidemia    Obesity    ASHWINI (obstructive sleep apnea)    Osteoarthritis    Osteoarthritis of both knees    Pes planus (flat feet)    Severe obesity (BMI >= 40) (HCC)    Sleep apnea    Unspecified essential hypertension    Visual impairment    glasses for distance      Past Surgical History:   Procedure Laterality Date    Cholecystectomy      Colonoscopy N/A 2017    Procedure: COLONOSCOPY;  Surgeon: Chidi Espinosa MD;  Location:  ENDOSCOPY    Dexa bone density axial and scan of wrist(cpt=77080)  2013    Hysterectomy  1973    partial hystero.    Alda biopsy stereo nodule 1 site right (cpt=19081)  2017    benign    Needle biopsy right Right 2021    1 SITE, STROMAL FIBROSIS w/ALH      3    . and     Oophorectomy  1973    Total abdom hysterectomy        Family History   Problem Relation Age of Onset    Colon Cancer Mother     Cancer Mother         Colon    Diabetes Father     Cancer Sister         breast    Breast Cancer Sister 64    Cancer Sister         Breast    Cancer Brother         Lung Cancer      Immunization History      Immunization History  Administered            Date(s) Administered    >=3 YRS FLUZONE OR FLUARIX QUAD PRESERVE FREE SINGLE DOSE (18278) FLU CLINIC                          2016      Covid-19 Vaccine Pfizer 30 mcg/0.3 ml                          2021  2021  10/02/2021                            2022      Covid-19 Vaccine Pfizer Bivalent 30mcg/0.3mL                          2022      Covid-19  Vaccine Pfizer Negrito-Sucrose 30 mcg/0.3 ml                          05/20/2022      FLU VAC High Dose 65 YRS & Older PRSV Free (15309)                          10/31/2019  10/22/2020  10/18/2021                            10/08/2022  09/23/2023      FLUAD High Dose 65 yr and older (52895)                          10/05/2018      FLUZONE 6 months and older PFS 0.5 ml (01423)                          10/16/2015  09/15/2017      Fluzone Vaccine Medicare ()                          10/31/2019  10/22/2020  10/18/2021      Pfizer Covid-19 Vaccine 30mcg/0.3ml 12yrs+ (8582-6043)                          09/26/2023      Pneumococcal (Prevnar 13)                          09/15/2017      Pneumovax 23          10/22/2020      RSV, recombinant, RSVpreF, adjuvanted (Arexvy)                          12/13/2023      Zoster Vaccine Recombinant Adjuvanted (Shingrix)                          06/28/2021 09/01/2021        SOCIAL HISTORY:   Social History     Socioeconomic History    Marital status:    Tobacco Use    Smoking status: Never    Smokeless tobacco: Never    Tobacco comments:     none   Vaping Use    Vaping status: Never Used   Substance and Sexual Activity    Alcohol use: Not Currently     Alcohol/week: 2.0 standard drinks of alcohol     Types: 2 Glasses of wine per week     Comment: twice a year    Drug use: No   Other Topics Concern    Caffeine Concern No    Exercise No    Seat Belt No    Special Diet No    Stress Concern No    Weight Concern No        REVIEW OF SYSTEMS:     General/Constitutional:   General able to do usual activities good general state of health, no fatigue, no fever, no weakness, no weight loss or gain .   HEENT/Neck:   Head no headache, no dizziness, no lightheadedness. Eyes normal vision, no redness, no drainage. Ears no earaches, no fullness, normal hearing, no tinnitus. Nose and Sinuses no recurrent colds, no discharge, no itching, no hay fever, no nosebleeds, no sinus trouble. Mouth and  Pharynx no sore throats, no hoarseness. Neck no lumps, no goiter, does continue to have soreness in the base of the neck.  Did see the neurosurgeon currently undergoing physical therapy and has a follow-up appointment with them  Endocrine:   Diabetes none. Thyroid disorder none.   Respiratory:   Patient denies chest pain, cough, NELSON (dyspnea on exertion), chest congestion, blood-tinged sputum/wheezing. Breathing normal pattern .  Cough much better patient has seen GI ENT and pulmonary-had an allergy testing done -cough was much better when mask was d/maury   Cardiovascular:   Patient denies chest pain, shortness of breath/rheumatic fever/murmur/dizzziness . Leg edema none. Orthopnea none. Palpitations none. PND (paroxsymal nocturnal dyspnea) none.   Gastrointestinal:   Patient denies abdominal pain, blood in stool, constipation, diarrhea, difficulty swallowing, change in stools,  vomiting no weight changes noted was given a referral to the weight loss clinic however the patient never followed up.  No heart burn noted.  Hematology:   Patient denies abnormal bleeding, easy bleeding, easy bruising. Enlarged lymph nodes none.   Women Only:   Patient denies breast pain.axillary nodes . Breast lumps or discharge none. Mammogram up-to-date yes   Genitourinary:   Patient denies difficulty urinating, frequent urination, hematuria. Dysuria none. Nocturia None. Urinary frequency no. Urinary incontinence yes   Musculoskeletal:   Arthritis No. Back pain  chronic  Joint pain knee pain bilaterally. Joint stiffness no. Muscle weakness none.   Peripheral Vascular:   General no varicosities, no claudication.   Dermatologic:   Rash no.   Neurologic:   Patient denies dizziness, fainting, loss of consciousness, weakness. Memory loss none. Tingling/numbness none. Trouble with balance none.   Psychiatric:   Patient denies depression, hallucinations, memory loss. Anxiety none. Insomnia occ   Does  Not use a ASHWINI mask cos of cough    EXAM:    /60 (BP Location: Left arm, Patient Position: Sitting, Cuff Size: large)   Pulse 78   Temp 97.9 °F (36.6 °C) (Temporal)   Resp 16   Ht 5' 3\" (1.6 m)   Wt 268 lb (121.6 kg)   SpO2 99%   BMI 47.47 kg/m²    >   BP Readings from Last 3 Encounters:   08/15/24 126/60   08/15/23 142/78   04/26/23 118/78          Build: normal .   General Appearance: alert and oriented, pleasant.   HEENT:   Ear canals: normal.   EOM: within normal limit-conjunctiva normal.     Head: normocephalic.   Nasal septum: midline.   Nose: unremarkable.   Oral cavity: normal. Narrow dion pharynx   Pupils: normal, bilaterally .   Sclera: normal.   Turbinates: normal.   NECK:   Carotid bruit: none.   Cervical lymph nodes: unremarkable.   JVD: none.   Range of Motion: normal.   Thyroid: unremarkable.   HEART:   Clicks: absent .   Edema: none visible .   Heart sounds: normal.   Murmurs: none.   Rhythm: regular.   BREASTS:   Appearance: symmetrical.   tenderness none.   Axilla: unremarkable.   Breast Mass: no palpable masses.   General: unremarkable.   Nipple Abnormality: none.   Skin Change: none.   LUNGS:   Auscultation: clear .   Chest Shape: normal .   Percussion: normal.   Rales: no .   Respiratory effort: normal .   Rhonchi: no.   Wheezes: no.   ABDOMEN:   General: normal.   Hernia: absent.   Inguinal nodes: none.   Liver, Spleen: non-enlarged.   Rebound tenderness: absent.   Tenderness: absent .   EXTREMITIES:   Clubbing: none.   Cyanosis: absent .   Edema: none.   Pulses: present, bilateral.   Tremors: no.   Varicose veins: not present.   MUSCULOSKELETAL:   Cervical spines: normal.   L-S spines: Less than optimum range of movement secondary to pain  Lower extremity joints: decreased range of motion -severe OA knees.   Upper extremity joints: normal.   NEUROLOGICAL:   Babinski: negative/all reflexes are normal.   Cerebellar Testing grossly/intact: yes.   Cranial Nerves: CN's II-XII grossly intact.   Gait: normal.   Mental Status:  Alert & oriented x 3.   Motor: power-normal/tone -normal/co-ordination normal/wasting -none/involuntary movements -none.   Reflexes: normal.   Sensory: normal sensation to all modalities.   LYMPHATICS:   Groin: no adenopathy .   Inguinal: no adenopathy.   Supraclavicular: none.   DERMATOLOGY:   Rash: no.        ASSESSMENT AND OTHER RELEVANT CHRONIC CONDITIONS:   Cierra Saenz is a 79 year old female who presents for a Medicare Assessment.     PLAN SUMMARY:   Diagnoses and all orders for this visit:    Routine general medical examination at a health care facility  -     CBC With Differential With Platelet; Future  -     Comp Metabolic Panel (14); Future  -     Hemoglobin A1C; Future  -     T4 (Thyroxine Total); Future  -     Assay, Thyroid Stim Hormone; Future  -     Urinalysis, Routine; Future  -     Discontinue: famotidine 40 MG Oral Tab; Take 1 tablet (40 mg total) by mouth nightly as needed for Heartburn.  -     XR DEXA BONE DENSITOMETRY (CPT=77080); Future    Essential hypertension, benign  -     EKG 12 Lead performed at [facility]; Future  -     amLODIPine 5 MG Oral Tab; Take 1 tablet (5 mg total) by mouth 2 (two) times daily.    Obstructive sleep apnea    Pure hypercholesterolemia  -     Lipid Panel; Future    Class 3 severe obesity due to excess calories with serious comorbidity and body mass index (BMI) of 45.0 to 49.9 in adult (HCC)    Primary osteoarthritis involving multiple joints  -     lidocaine (HM LIDOCAINE PATCH) 4 % External Patch; Place 1 patch onto the skin daily.    All above are stable.  Pending labs prior to further recommendation  The patient indicates understanding of these issues and agrees to the plan.  The patient is asked to return in 4 weeks  for f/u   Diet counseling perfomed    SUGGESTED VACCINATIONS - Influenza, Pneumococcal, Zoster, Tetanus   Influenza: No recommendations at this time  Pneumonia: No recommendations at this time

## 2024-08-15 NOTE — TELEPHONE ENCOUNTER
Canceled RX to mail order and sent to the [facility] as directed by patient.

## 2024-08-21 ENCOUNTER — HOSPITAL ENCOUNTER (OUTPATIENT)
Dept: BONE DENSITY | Age: 79
Discharge: HOME | End: 2024-08-21
Attending: INTERNAL MEDICINE
Payer: MEDICARE

## 2024-08-21 ENCOUNTER — LAB ENCOUNTER (OUTPATIENT)
Dept: LAB | Age: 79
End: 2024-08-21
Attending: INTERNAL MEDICINE
Payer: MEDICARE

## 2024-08-21 DIAGNOSIS — E78.00 PURE HYPERCHOLESTEROLEMIA: Chronic | ICD-10-CM

## 2024-08-21 DIAGNOSIS — Z00.00 ROUTINE GENERAL MEDICAL EXAMINATION AT A HEALTH CARE FACILITY: ICD-10-CM

## 2024-08-21 LAB
ALBUMIN SERPL-MCNC: 4.7 G/DL (ref 3.2–4.8)
ALBUMIN/GLOB SERPL: 1.9 {RATIO} (ref 1–2)
ALP LIVER SERPL-CCNC: 59 U/L
ALT SERPL-CCNC: 15 U/L
ANION GAP SERPL CALC-SCNC: 6 MMOL/L (ref 0–18)
AST SERPL-CCNC: 21 U/L (ref ?–34)
BASOPHILS # BLD AUTO: 0.05 X10(3) UL (ref 0–0.2)
BASOPHILS NFR BLD AUTO: 0.9 %
BILIRUB SERPL-MCNC: 0.9 MG/DL (ref 0.2–1.1)
BILIRUB UR QL STRIP.AUTO: NEGATIVE
BUN BLD-MCNC: 14 MG/DL (ref 9–23)
CALCIUM BLD-MCNC: 10.4 MG/DL (ref 8.7–10.4)
CHLORIDE SERPL-SCNC: 104 MMOL/L (ref 98–112)
CHOLEST SERPL-MCNC: 193 MG/DL (ref ?–200)
CO2 SERPL-SCNC: 30 MMOL/L (ref 21–32)
COLOR UR AUTO: YELLOW
CREAT BLD-MCNC: 0.97 MG/DL
EGFRCR SERPLBLD CKD-EPI 2021: 60 ML/MIN/1.73M2 (ref 60–?)
EOSINOPHIL # BLD AUTO: 0.21 X10(3) UL (ref 0–0.7)
EOSINOPHIL NFR BLD AUTO: 3.6 %
ERYTHROCYTE [DISTWIDTH] IN BLOOD BY AUTOMATED COUNT: 14.9 %
EST. AVERAGE GLUCOSE BLD GHB EST-MCNC: 114 MG/DL (ref 68–126)
FASTING PATIENT LIPID ANSWER: YES
FASTING STATUS PATIENT QL REPORTED: YES
GLOBULIN PLAS-MCNC: 2.5 G/DL (ref 2–3.5)
GLUCOSE BLD-MCNC: 89 MG/DL (ref 70–99)
GLUCOSE UR STRIP.AUTO-MCNC: NORMAL MG/DL
HBA1C MFR BLD: 5.6 % (ref ?–5.7)
HCT VFR BLD AUTO: 42 %
HDLC SERPL-MCNC: 57 MG/DL (ref 40–59)
HGB BLD-MCNC: 13.9 G/DL
HYALINE CASTS #/AREA URNS AUTO: PRESENT /LPF
IMM GRANULOCYTES # BLD AUTO: 0.01 X10(3) UL (ref 0–1)
IMM GRANULOCYTES NFR BLD: 0.2 %
KETONES UR STRIP.AUTO-MCNC: NEGATIVE MG/DL
LDLC SERPL CALC-MCNC: 121 MG/DL (ref ?–100)
LEUKOCYTE ESTERASE UR QL STRIP.AUTO: NEGATIVE
LYMPHOCYTES # BLD AUTO: 2.48 X10(3) UL (ref 1–4)
LYMPHOCYTES NFR BLD AUTO: 42.5 %
MCH RBC QN AUTO: 27.5 PG (ref 26–34)
MCHC RBC AUTO-ENTMCNC: 33.1 G/DL (ref 31–37)
MCV RBC AUTO: 83.2 FL
MONOCYTES # BLD AUTO: 0.51 X10(3) UL (ref 0.1–1)
MONOCYTES NFR BLD AUTO: 8.7 %
NEUTROPHILS # BLD AUTO: 2.58 X10 (3) UL (ref 1.5–7.7)
NEUTROPHILS # BLD AUTO: 2.58 X10(3) UL (ref 1.5–7.7)
NEUTROPHILS NFR BLD AUTO: 44.1 %
NITRITE UR QL STRIP.AUTO: NEGATIVE
NONHDLC SERPL-MCNC: 136 MG/DL (ref ?–130)
OSMOLALITY SERPL CALC.SUM OF ELEC: 290 MOSM/KG (ref 275–295)
PH UR STRIP.AUTO: 5.5 [PH] (ref 5–8)
PLATELET # BLD AUTO: 186 10(3)UL (ref 150–450)
POTASSIUM SERPL-SCNC: 3.6 MMOL/L (ref 3.5–5.1)
PROT SERPL-MCNC: 7.2 G/DL (ref 5.7–8.2)
PROT UR STRIP.AUTO-MCNC: 20 MG/DL
RBC # BLD AUTO: 5.05 X10(6)UL
RBC UR QL AUTO: NEGATIVE
SODIUM SERPL-SCNC: 140 MMOL/L (ref 136–145)
SP GR UR STRIP.AUTO: 1.02 (ref 1–1.03)
T4 SERPL-MCNC: 7.9 UG/DL
TRIGL SERPL-MCNC: 85 MG/DL (ref 30–149)
TSI SER-ACNC: 3.21 MIU/ML (ref 0.55–4.78)
UROBILINOGEN UR STRIP.AUTO-MCNC: NORMAL MG/DL
VLDLC SERPL CALC-MCNC: 15 MG/DL (ref 0–30)
WBC # BLD AUTO: 5.8 X10(3) UL (ref 4–11)

## 2024-08-21 PROCEDURE — 80061 LIPID PANEL: CPT

## 2024-08-21 PROCEDURE — 77080 DXA BONE DENSITY AXIAL: CPT | Performed by: INTERNAL MEDICINE

## 2024-08-21 PROCEDURE — 83036 HEMOGLOBIN GLYCOSYLATED A1C: CPT

## 2024-08-21 PROCEDURE — 84436 ASSAY OF TOTAL THYROXINE: CPT

## 2024-08-21 PROCEDURE — 84443 ASSAY THYROID STIM HORMONE: CPT

## 2024-08-21 PROCEDURE — 85025 COMPLETE CBC W/AUTO DIFF WBC: CPT

## 2024-08-21 PROCEDURE — 81001 URINALYSIS AUTO W/SCOPE: CPT

## 2024-08-21 PROCEDURE — 80053 COMPREHEN METABOLIC PANEL: CPT

## 2024-08-22 ENCOUNTER — TELEPHONE (OUTPATIENT)
Dept: INTERNAL MEDICINE CLINIC | Facility: CLINIC | Age: 79
End: 2024-08-22

## 2024-08-22 DIAGNOSIS — R82.90 ABNORMAL URINE: Primary | ICD-10-CM

## 2024-08-22 NOTE — TELEPHONE ENCOUNTER
Future Appointments   Date Time Provider Department Center   8/23/2024 11:00 AM [facility]   9/3/2024 12:30 PM Abebe Martinez MD [facility] 8 [facility] [place]

## 2024-08-23 ENCOUNTER — LAB ENCOUNTER (OUTPATIENT)
Dept: LAB | Age: 79
End: 2024-08-23
Attending: INTERNAL MEDICINE
Payer: MEDICARE

## 2024-08-23 DIAGNOSIS — R82.90 ABNORMAL URINE: ICD-10-CM

## 2024-08-23 LAB
BILIRUB UR QL STRIP.AUTO: NEGATIVE
CLARITY UR REFRACT.AUTO: CLEAR
COLOR UR AUTO: COLORLESS
GLUCOSE UR STRIP.AUTO-MCNC: NORMAL MG/DL
KETONES UR STRIP.AUTO-MCNC: NEGATIVE MG/DL
LEUKOCYTE ESTERASE UR QL STRIP.AUTO: NEGATIVE
NITRITE UR QL STRIP.AUTO: NEGATIVE
PH UR STRIP.AUTO: 6.5 [PH] (ref 5–8)
PROT UR STRIP.AUTO-MCNC: NEGATIVE MG/DL
RBC UR QL AUTO: NEGATIVE
SP GR UR STRIP.AUTO: <1.005 (ref 1–1.03)
UROBILINOGEN UR STRIP.AUTO-MCNC: NORMAL MG/DL

## 2024-08-23 PROCEDURE — 87086 URINE CULTURE/COLONY COUNT: CPT

## 2024-08-23 PROCEDURE — 81003 URINALYSIS AUTO W/O SCOPE: CPT

## 2024-09-03 ENCOUNTER — VIRTUAL PHONE E/M (OUTPATIENT)
Dept: INTERNAL MEDICINE CLINIC | Facility: CLINIC | Age: 79
End: 2024-09-03
Payer: COMMERCIAL

## 2024-09-03 DIAGNOSIS — E78.00 PURE HYPERCHOLESTEROLEMIA: Primary | Chronic | ICD-10-CM

## 2024-09-03 DIAGNOSIS — M85.80 OSTEOPENIA, UNSPECIFIED LOCATION: ICD-10-CM

## 2024-09-03 PROCEDURE — 99213 OFFICE O/P EST LOW 20 MIN: CPT | Performed by: INTERNAL MEDICINE

## 2024-09-03 RX ORDER — ALENDRONATE SODIUM 70 MG/1
70 TABLET ORAL WEEKLY
Qty: 12 TABLET | Refills: 3 | Status: SHIPPED | OUTPATIENT
Start: 2024-09-03 | End: 2025-09-03

## 2024-09-03 RX ORDER — ATORVASTATIN CALCIUM 10 MG/1
10 TABLET, FILM COATED ORAL NIGHTLY
Qty: 90 TABLET | Refills: 3 | Status: SHIPPED | OUTPATIENT
Start: 2024-09-03 | End: 2025-09-03

## 2024-09-03 NOTE — PROGRESS NOTES
Virtual Telephone Check-In    Cierra Saenz verbally consents to a Virtual/Telephone Check-In visit on 09/03/24.  Patient has been referred to the [facility] website at [email or web address] to review the yearly Consent to Treat document.    Patient understands and accepts financial responsibility for any deductible, co-insurance and/or co-pays associated with this service.    Duration of the service: 11 minutes      Summary of topics discussed:   Discussed with patient labs patient agreeable to starting statins.  Will repeat blood work in about 2 months.  Also discussed with patient DEXA scan agreeable to taking medicines as prescribed below.  She also take vitamin D 2000 units in addition to calcium carbonate 1200 mg daily.  Will repeat the DEXA scan in 2 years.    Diagnoses and all orders for this visit:    Pure hypercholesterolemia  -     atorvastatin 10 MG Oral Tab; Take 1 tablet (10 mg total) by mouth nightly.  -     Lipid Panel; Future  -     Hepatic Function Panel (7); Future    Osteopenia, unspecified location  -     alendronate (FOSAMAX) 70 MG Oral Tab; Take 1 tablet (70 mg total) by mouth once a week.          Abebe Martinez MD

## 2024-09-10 ENCOUNTER — PATIENT OUTREACH (OUTPATIENT)
Dept: CASE MANAGEMENT | Age: 79
End: 2024-09-10

## 2024-09-10 NOTE — PROGRESS NOTES
Called patient for Chronic Care Management      Left message to call back   Call #1    [facility]  Care Management Department  [phone] work

## 2024-11-04 ENCOUNTER — LAB ENCOUNTER (OUTPATIENT)
Dept: LAB | Age: 79
End: 2024-11-04
Attending: INTERNAL MEDICINE
Payer: MEDICARE

## 2024-11-04 DIAGNOSIS — E78.00 PURE HYPERCHOLESTEROLEMIA: Chronic | ICD-10-CM

## 2024-11-04 LAB
ALBUMIN SERPL-MCNC: 4.1 G/DL (ref 3.2–4.8)
ALP LIVER SERPL-CCNC: 52 U/L
ALT SERPL-CCNC: 14 U/L
AST SERPL-CCNC: 21 U/L (ref ?–34)
BILIRUB DIRECT SERPL-MCNC: 0.3 MG/DL (ref ?–0.3)
BILIRUB SERPL-MCNC: 0.9 MG/DL (ref 0.2–1.1)
CHOLEST SERPL-MCNC: 133 MG/DL (ref ?–200)
FASTING PATIENT LIPID ANSWER: YES
HDLC SERPL-MCNC: 52 MG/DL (ref 40–59)
LDLC SERPL CALC-MCNC: 65 MG/DL (ref ?–100)
NONHDLC SERPL-MCNC: 81 MG/DL (ref ?–130)
PROT SERPL-MCNC: 7 G/DL (ref 5.7–8.2)
TRIGL SERPL-MCNC: 82 MG/DL (ref 30–149)
VLDLC SERPL CALC-MCNC: 12 MG/DL (ref 0–30)

## 2024-11-04 PROCEDURE — 80076 HEPATIC FUNCTION PANEL: CPT

## 2024-11-04 PROCEDURE — 36415 COLL VENOUS BLD VENIPUNCTURE: CPT

## 2024-11-04 PROCEDURE — 80061 LIPID PANEL: CPT

## 2024-11-18 ENCOUNTER — PATIENT MESSAGE (OUTPATIENT)
Dept: INTERNAL MEDICINE CLINIC | Facility: CLINIC | Age: 79
End: 2024-11-18

## 2024-11-18 DIAGNOSIS — R92.8 ABNORMAL MAMMOGRAM: Primary | ICD-10-CM

## 2024-12-02 RX ORDER — LEVOCETIRIZINE DIHYDROCHLORIDE 5 MG/1
5 TABLET, FILM COATED ORAL EVERY EVENING
Qty: 90 TABLET | Refills: 3 | Status: SHIPPED | OUTPATIENT
Start: 2024-12-02

## 2025-01-06 ENCOUNTER — LAB ENCOUNTER (OUTPATIENT)
Dept: LAB | Age: 80
End: 2025-01-06
Attending: INTERNAL MEDICINE
Payer: MEDICARE

## 2025-01-06 ENCOUNTER — OFFICE VISIT (OUTPATIENT)
Dept: INTERNAL MEDICINE CLINIC | Facility: CLINIC | Age: 80
End: 2025-01-06
Payer: COMMERCIAL

## 2025-01-06 ENCOUNTER — EKG ENCOUNTER (OUTPATIENT)
Dept: LAB | Age: 80
End: 2025-01-06
Attending: INTERNAL MEDICINE

## 2025-01-06 VITALS
HEIGHT: 63 IN | WEIGHT: 267 LBS | DIASTOLIC BLOOD PRESSURE: 70 MMHG | OXYGEN SATURATION: 99 % | RESPIRATION RATE: 16 BRPM | TEMPERATURE: 98 F | BODY MASS INDEX: 47.31 KG/M2 | HEART RATE: 74 BPM | SYSTOLIC BLOOD PRESSURE: 136 MMHG

## 2025-01-06 DIAGNOSIS — G47.33 OBSTRUCTIVE SLEEP APNEA: Chronic | ICD-10-CM

## 2025-01-06 DIAGNOSIS — I10 ESSENTIAL HYPERTENSION, BENIGN: Chronic | ICD-10-CM

## 2025-01-06 DIAGNOSIS — I10 ESSENTIAL HYPERTENSION, BENIGN: Primary | Chronic | ICD-10-CM

## 2025-01-06 DIAGNOSIS — R42 DIZZY: ICD-10-CM

## 2025-01-06 PROBLEM — M15.0 PRIMARY OSTEOARTHRITIS INVOLVING MULTIPLE JOINTS: Chronic | Status: ACTIVE | Noted: 2024-08-15

## 2025-01-06 LAB
ANION GAP SERPL CALC-SCNC: 8 MMOL/L (ref 0–18)
BASOPHILS # BLD AUTO: 0.04 X10(3) UL (ref 0–0.2)
BASOPHILS NFR BLD AUTO: 0.6 %
BUN BLD-MCNC: 13 MG/DL (ref 9–23)
CALCIUM BLD-MCNC: 10.5 MG/DL (ref 8.7–10.4)
CHLORIDE SERPL-SCNC: 106 MMOL/L (ref 98–112)
CO2 SERPL-SCNC: 28 MMOL/L (ref 21–32)
CREAT BLD-MCNC: 0.9 MG/DL
EGFRCR SERPLBLD CKD-EPI 2021: 65 ML/MIN/1.73M2 (ref 60–?)
EOSINOPHIL # BLD AUTO: 0.14 X10(3) UL (ref 0–0.7)
EOSINOPHIL NFR BLD AUTO: 2.1 %
ERYTHROCYTE [DISTWIDTH] IN BLOOD BY AUTOMATED COUNT: 15.6 %
FASTING STATUS PATIENT QL REPORTED: YES
GLUCOSE BLD-MCNC: 95 MG/DL (ref 70–99)
HCT VFR BLD AUTO: 44.2 %
HGB BLD-MCNC: 14.3 G/DL
IMM GRANULOCYTES # BLD AUTO: 0.01 X10(3) UL (ref 0–1)
IMM GRANULOCYTES NFR BLD: 0.1 %
LYMPHOCYTES # BLD AUTO: 2.46 X10(3) UL (ref 1–4)
LYMPHOCYTES NFR BLD AUTO: 36.8 %
MAGNESIUM SERPL-MCNC: 1.9 MG/DL (ref 1.6–2.6)
MCH RBC QN AUTO: 27.3 PG (ref 26–34)
MCHC RBC AUTO-ENTMCNC: 32.4 G/DL (ref 31–37)
MCV RBC AUTO: 84.4 FL
MONOCYTES # BLD AUTO: 0.46 X10(3) UL (ref 0.1–1)
MONOCYTES NFR BLD AUTO: 6.9 %
NEUTROPHILS # BLD AUTO: 3.57 X10 (3) UL (ref 1.5–7.7)
NEUTROPHILS # BLD AUTO: 3.57 X10(3) UL (ref 1.5–7.7)
NEUTROPHILS NFR BLD AUTO: 53.5 %
OSMOLALITY SERPL CALC.SUM OF ELEC: 294 MOSM/KG (ref 275–295)
PLATELET # BLD AUTO: 194 10(3)UL (ref 150–450)
POTASSIUM SERPL-SCNC: 4.1 MMOL/L (ref 3.5–5.1)
RBC # BLD AUTO: 5.24 X10(6)UL
SODIUM SERPL-SCNC: 142 MMOL/L (ref 136–145)
WBC # BLD AUTO: 6.7 X10(3) UL (ref 4–11)

## 2025-01-06 PROCEDURE — 1160F RVW MEDS BY RX/DR IN RCRD: CPT | Performed by: INTERNAL MEDICINE

## 2025-01-06 PROCEDURE — 99214 OFFICE O/P EST MOD 30 MIN: CPT | Performed by: INTERNAL MEDICINE

## 2025-01-06 PROCEDURE — 3075F SYST BP GE 130 - 139MM HG: CPT | Performed by: INTERNAL MEDICINE

## 2025-01-06 PROCEDURE — 36415 COLL VENOUS BLD VENIPUNCTURE: CPT

## 2025-01-06 PROCEDURE — 1159F MED LIST DOCD IN RCRD: CPT | Performed by: INTERNAL MEDICINE

## 2025-01-06 PROCEDURE — 83735 ASSAY OF MAGNESIUM: CPT

## 2025-01-06 PROCEDURE — 3008F BODY MASS INDEX DOCD: CPT | Performed by: INTERNAL MEDICINE

## 2025-01-06 PROCEDURE — 85025 COMPLETE CBC W/AUTO DIFF WBC: CPT

## 2025-01-06 PROCEDURE — 3078F DIAST BP <80 MM HG: CPT | Performed by: INTERNAL MEDICINE

## 2025-01-06 PROCEDURE — 80048 BASIC METABOLIC PNL TOTAL CA: CPT

## 2025-01-06 RX ORDER — METOPROLOL SUCCINATE 25 MG/1
25 TABLET, EXTENDED RELEASE ORAL DAILY
Qty: 30 TABLET | Refills: 2 | Status: SHIPPED | OUTPATIENT
Start: 2025-01-06 | End: 2025-04-06

## 2025-01-06 NOTE — PROGRESS NOTES
Cierra Saenz  1945 is a 79 year old female.    Chief Complaint   Patient presents with    Follow - Up     Lighted headed when wake up       HPI:     General:   General:   The dizziness began few days ago.  Generally happens when she gets up from a laying down position.  Does have some buzzing in the ears  Current Outpatient Medications   Medication Sig Dispense Refill    metoprolol succinate ER 25 MG Oral Tablet 24 Hr Take 1 tablet (25 mg total) by mouth daily. 30 tablet 2    alendronate (FOSAMAX) 70 MG Oral Tab Take 1 tablet (70 mg total) by mouth once a week. 12 tablet 3    atorvastatin 10 MG Oral Tab Take 1 tablet (10 mg total) by mouth nightly. 90 tablet 3    amLODIPine 5 MG Oral Tab Take 1 tablet (5 mg total) by mouth 2 (two) times daily. 180 tablet 3    TEMAZEPAM 15 MG Oral Cap TAKE 1 CAPSULE(15 MG) BY MOUTH EVERY NIGHT AS NEEDED FOR SLEEP 30 capsule 0    triamterene-hydroCHLOROthiazide 37.5-25 MG Oral Cap Take 1 capsule by mouth every morning. 100 capsule 2    aspirin 81 MG Oral Tab Take 1 tablet (81 mg total) by mouth daily.      omega-3 fatty acids (FISH OIL) 1000 MG Oral Cap Take 1,000 mg by mouth daily.      Calcium 1500 MG Oral Tab Take 1 tablet by mouth 2 (two) times daily with meals.      LEVOCETIRIZINE 5 MG Oral Tab TAKE 1 TABLET(5 MG) BY MOUTH EVERY EVENING (Patient not taking: Reported on 2025) 90 tablet 3      Past Medical History:    Allergies    Arthritis    Arthritis of knee    Chest pain of uncertain etiology    Esophageal reflux    High blood pressure    Hyperlipidemia    Obesity    ASHWINI (obstructive sleep apnea)    Osteoarthritis    Osteoarthritis of both knees    Pes planus (flat feet)    Severe obesity (BMI >= 40) ([facility])    Sleep apnea    Unspecified essential hypertension    Visual impairment    glasses for distance      Social History:  Social History     Socioeconomic History    Marital status:    Tobacco Use    Smoking status: Never    Smokeless tobacco: Never     Tobacco comments:     none   Vaping Use    Vaping status: Never Used   Substance and Sexual Activity    Alcohol use: Not Currently     Alcohol/week: 2.0 standard drinks of alcohol     Types: 2 Glasses of wine per week     Comment: twice a year    Drug use: No   Other Topics Concern    Caffeine Concern No    Exercise No    Seat Belt No    Special Diet No    Stress Concern No    Weight Concern No        REVIEW OF SYSTEMS:     General/Constitutional:   Fatigue no. Fever no. Night sweats no. Weakness no. Weight loss no.   Neurologic:   Headache no. Memory loss no. Seizures no. Tingling/numbness no. Tremor no.       EXAM:   /70 (BP Location: Left arm, Patient Position: Sitting, Cuff Size: large)   Pulse 74   Temp 97.9 °F (36.6 °C) (Temporal)   Resp 16   Ht 5' 3\" (1.6 m)   Wt 267 lb (121.1 kg)   SpO2 99%   BMI 47.30 kg/m²   General Examination:   GENERAL APPEARANCE: alert and oriented, in no acute distress.  No definite orthostasis noted  HEENT: EOMI, PERRLA.   NECK/THYROID: normal range of motion, no carotid bruit, no thyroid abnormality.   RESPIRATORY: clear to auscultation bilaterally.   CARDIOVASCULAR: regular sinus rhythm/regular rate and rhythm, normal S1S2, no murmurs.    GASTROINTESTINAL: soft, non-tender/non-distended, bowel sounds present, no hepatosplenomegaly.   EXTREMITIES: traceedema.   PERIPHERAL PULSES: normal.   NEUROLOGIC EXAM: movement of head induces symptoms.   NEUROLOGICAL:   eye pupils bilaterally equal reacting to light.   Cerebellar: WNL, finger-to-nose - normal, heel-to-shin - normal, Romberg - normal.   Coordination: normal.   Cranial Nerves: CN's II-XII grossly intact.   Romberg: negative.   Gait: normal  Involuntary Movements: no tremors seen .   Mental Status: Alert & oriented x 3.   Motor: normal strength bilaterally.   Muscle Bulk: normal .   Plantars: downgoing bilaterally.   Reflexes: normal.   Sensory: normal sensation.   Tone: normal.     ASSESSMENT AND PLAN:   Cierra whyte  seen today for follow - up.    Diagnoses and all orders for this visit:    Essential hypertension, benign  -     metoprolol succinate ER 25 MG Oral Tablet 24 Hr; Take 1 tablet (25 mg total) by mouth daily.  -     Basic Metabolic Panel (8); Future  -     CBC With Differential With Platelet; Future  -     Magnesium; Future  -     EKG 12 Lead performed at [facility]; Future    Dizzy  -     metoprolol succinate ER 25 MG Oral Tablet 24 Hr; Take 1 tablet (25 mg total) by mouth daily.  -     EKG 12 Lead performed at [facility]; Future    Obstructive sleep apnea  -     Pulmonary Referral - In Network        Patient Instructions   Patient requesting referral for pulmonary has discontinued her ASHWINI mask.  Pending ENT referral if no better    The patient indicates understanding of these issues and agrees to the plan.  The patient is asked to Return in about 1 week (around 1/13/2025).    Abebe Martinez MD      [place]

## 2025-01-06 NOTE — PATIENT INSTRUCTIONS
Patient requesting referral for pulmonary has discontinued her ASHWINI mask.  Pending ENT referral if no better

## 2025-01-08 LAB
ATRIAL RATE: 63 BPM
P AXIS: 39 DEGREES
P-R INTERVAL: 202 MS
Q-T INTERVAL: 442 MS
QRS DURATION: 116 MS
QTC CALCULATION (BEZET): 452 MS
R AXIS: -38 DEGREES
T AXIS: 21 DEGREES
VENTRICULAR RATE: 63 BPM

## 2025-01-14 ENCOUNTER — PATIENT MESSAGE (OUTPATIENT)
Dept: INTERNAL MEDICINE CLINIC | Facility: CLINIC | Age: 80
End: 2025-01-14

## 2025-01-14 ENCOUNTER — OFFICE VISIT (OUTPATIENT)
Dept: INTERNAL MEDICINE CLINIC | Facility: CLINIC | Age: 80
End: 2025-01-14
Payer: COMMERCIAL

## 2025-01-14 VITALS
HEART RATE: 65 BPM | OXYGEN SATURATION: 99 % | HEIGHT: 63 IN | DIASTOLIC BLOOD PRESSURE: 60 MMHG | TEMPERATURE: 97 F | RESPIRATION RATE: 16 BRPM | WEIGHT: 267.63 LBS | BODY MASS INDEX: 47.42 KG/M2 | SYSTOLIC BLOOD PRESSURE: 132 MMHG

## 2025-01-14 DIAGNOSIS — R42 DIZZY: Primary | ICD-10-CM

## 2025-01-14 PROCEDURE — 1159F MED LIST DOCD IN RCRD: CPT | Performed by: INTERNAL MEDICINE

## 2025-01-14 PROCEDURE — 3075F SYST BP GE 130 - 139MM HG: CPT | Performed by: INTERNAL MEDICINE

## 2025-01-14 PROCEDURE — 3008F BODY MASS INDEX DOCD: CPT | Performed by: INTERNAL MEDICINE

## 2025-01-14 PROCEDURE — 3078F DIAST BP <80 MM HG: CPT | Performed by: INTERNAL MEDICINE

## 2025-01-14 PROCEDURE — 99213 OFFICE O/P EST LOW 20 MIN: CPT | Performed by: INTERNAL MEDICINE

## 2025-01-14 PROCEDURE — 1160F RVW MEDS BY RX/DR IN RCRD: CPT | Performed by: INTERNAL MEDICINE

## 2025-01-14 RX ORDER — ASCORBIC ACID 500 MG
500 TABLET ORAL DAILY
COMMUNITY

## 2025-01-14 RX ORDER — ACETAMINOPHEN 160 MG
2000 TABLET,DISINTEGRATING ORAL DAILY
COMMUNITY

## 2025-01-14 RX ORDER — MECLIZINE HCL 12.5 MG 12.5 MG/1
12.5 TABLET ORAL 3 TIMES DAILY PRN
Qty: 60 TABLET | Refills: 0 | Status: SHIPPED | OUTPATIENT
Start: 2025-01-14 | End: 2025-02-13

## 2025-01-14 RX ORDER — PHENOL 1.4 %
10 AEROSOL, SPRAY (ML) MUCOUS MEMBRANE NIGHTLY
COMMUNITY

## 2025-01-14 NOTE — PROGRESS NOTES
Cierra Saenz  1945 is a 79 year old female.    Chief Complaint   Patient presents with    Follow - Up       HPI:   Follow-up including blood work.  Patient still having transient episodes of dizziness.  Not much relief  Current Outpatient Medications   Medication Sig Dispense Refill    cholecalciferol 50 MCG (2000 UT) Oral Cap Take 1 capsule (2,000 Units total) by mouth daily.      Vitamin C 500 MG Oral Tab Take 1 tablet (500 mg total) by mouth daily.      Melatonin 10 MG Oral Tab Take 10 mg by mouth at bedtime.      meclizine 12.5 MG Oral Tab Take 1 tablet (12.5 mg total) by mouth 3 (three) times daily as needed. 60 tablet 0    metoprolol succinate ER 25 MG Oral Tablet 24 Hr Take 1 tablet (25 mg total) by mouth daily. 30 tablet 2    alendronate (FOSAMAX) 70 MG Oral Tab Take 1 tablet (70 mg total) by mouth once a week. 12 tablet 3    atorvastatin 10 MG Oral Tab Take 1 tablet (10 mg total) by mouth nightly. 90 tablet 3    amLODIPine 5 MG Oral Tab Take 1 tablet (5 mg total) by mouth 2 (two) times daily. 180 tablet 3    TEMAZEPAM 15 MG Oral Cap TAKE 1 CAPSULE(15 MG) BY MOUTH EVERY NIGHT AS NEEDED FOR SLEEP 30 capsule 0    triamterene-hydroCHLOROthiazide 37.5-25 MG Oral Cap Take 1 capsule by mouth every morning. 100 capsule 2    aspirin 81 MG Oral Tab Take 1 tablet (81 mg total) by mouth daily.      omega-3 fatty acids (FISH OIL) 1000 MG Oral Cap Take 1,000 mg by mouth daily.      Calcium 1500 MG Oral Tab Take 1 tablet by mouth 2 (two) times daily with meals.        Past Medical History:    Allergies    Arthritis    Arthritis of knee    Chest pain of uncertain etiology    Esophageal reflux    High blood pressure    Hyperlipidemia    Obesity    ASHWINI (obstructive sleep apnea)    Osteoarthritis    Osteoarthritis of both knees    Pes planus (flat feet)    Severe obesity (BMI >= 40) ([facility])    Sleep apnea    Unspecified essential hypertension    Visual impairment    glasses for distance      Social  History:  Social History     Socioeconomic History    Marital status:    Tobacco Use    Smoking status: Never    Smokeless tobacco: Never    Tobacco comments:     none   Vaping Use    Vaping status: Never Used   Substance and Sexual Activity    Alcohol use: Not Currently     Alcohol/week: 2.0 standard drinks of alcohol     Types: 2 Glasses of wine per week     Comment: twice a year    Drug use: No   Other Topics Concern    Caffeine Concern No    Exercise No    Seat Belt No    Special Diet No    Stress Concern No    Weight Concern No        REVIEW OF SYSTEMS:    No Change        EXAM:   /60   Pulse 65   Temp 97.3 °F (36.3 °C) (Temporal)   Resp 16   Ht 5' 3\" (1.6 m)   Wt 267 lb 9.6 oz (121.4 kg)   SpO2 99%   BMI 47.40 kg/m²     No change      ASSESSMENT AND PLAN:   Cierra was seen today for follow - up.    Diagnoses and all orders for this visit:    Dizzy  -     meclizine 12.5 MG Oral Tab; Take 1 tablet (12.5 mg total) by mouth 3 (three) times daily as needed.  -     Neuro Referral - In Network  -     ENT Referral - In Network    Lab work EKG discussed with patient.    Patient Instructions   Will await consult opinion.   The patient indicates understanding of these issues and agrees to the plan.  The patient is asked to Return in about 3 months (around 4/14/2025), or if symptoms worsen or fail to improve.  .      Abebe Martinez MD

## 2025-01-20 ENCOUNTER — TELEPHONE (OUTPATIENT)
Dept: INTERNAL MEDICINE CLINIC | Facility: CLINIC | Age: 80
End: 2025-01-20

## 2025-01-20 DIAGNOSIS — Z12.31 ENCOUNTER FOR SCREENING MAMMOGRAM FOR MALIGNANT NEOPLASM OF BREAST: ICD-10-CM

## 2025-01-20 DIAGNOSIS — R92.8 ABNORMAL MAMMOGRAM: Primary | ICD-10-CM

## 2025-01-20 NOTE — TELEPHONE ENCOUNTER
Jayashree  [facility]  # [phone]    Jayashree requesting annual screening mammogram order. She will look in the chart upon completion, no c/b needed. She says Dr. Jimenes stated pt no longer needs diagnostic.

## 2025-01-27 DIAGNOSIS — E78.00 PURE HYPERCHOLESTEROLEMIA: Chronic | ICD-10-CM

## 2025-01-27 DIAGNOSIS — I10 ESSENTIAL HYPERTENSION, BENIGN: Chronic | ICD-10-CM

## 2025-01-27 DIAGNOSIS — M85.80 OSTEOPENIA, UNSPECIFIED LOCATION: ICD-10-CM

## 2025-01-27 DIAGNOSIS — R42 DIZZY: ICD-10-CM

## 2025-01-27 RX ORDER — ATORVASTATIN CALCIUM 10 MG/1
10 TABLET, FILM COATED ORAL NIGHTLY
Qty: 90 TABLET | Refills: 3 | Status: SHIPPED | OUTPATIENT
Start: 2025-01-27 | End: 2026-01-27

## 2025-01-27 RX ORDER — ALENDRONATE SODIUM 70 MG/1
70 TABLET ORAL WEEKLY
Qty: 12 TABLET | Refills: 3 | Status: SHIPPED | OUTPATIENT
Start: 2025-01-27 | End: 2026-01-27

## 2025-01-27 RX ORDER — METOPROLOL SUCCINATE 25 MG/1
25 TABLET, EXTENDED RELEASE ORAL DAILY
Qty: 90 TABLET | Refills: 1 | Status: SHIPPED | OUTPATIENT
Start: 2025-01-27 | End: 2025-07-26

## 2025-01-31 ENCOUNTER — HOSPITAL ENCOUNTER (OUTPATIENT)
Dept: MAMMOGRAPHY | Facility: HOSPITAL | Age: 80
Discharge: HOME | End: 2025-01-31
Attending: INTERNAL MEDICINE
Payer: MEDICARE

## 2025-01-31 DIAGNOSIS — Z12.31 ENCOUNTER FOR SCREENING MAMMOGRAM FOR MALIGNANT NEOPLASM OF BREAST: ICD-10-CM

## 2025-01-31 PROCEDURE — 77063 BREAST TOMOSYNTHESIS BI: CPT | Performed by: INTERNAL MEDICINE

## 2025-01-31 PROCEDURE — 77067 SCR MAMMO BI INCL CAD: CPT | Performed by: INTERNAL MEDICINE

## 2025-02-28 DIAGNOSIS — F51.02 ADJUSTMENT INSOMNIA: ICD-10-CM

## 2025-03-01 NOTE — TELEPHONE ENCOUNTER
Protocol failed     LOV: 1/14/25   RTC: 3 months  Filled: 6/3/24 #30   Labs: 1/6/25   Future Appointments   Date Time Provider Department Center   4/7/2025 10:00 AM Denise Burnham PA [facility] [place]

## 2025-03-02 RX ORDER — TEMAZEPAM 15 MG/1
CAPSULE ORAL
Qty: 30 CAPSULE | Refills: 0 | Status: SHIPPED | OUTPATIENT
Start: 2025-03-02

## 2025-03-08 DIAGNOSIS — I10 ESSENTIAL HYPERTENSION, BENIGN: Chronic | ICD-10-CM

## 2025-03-08 DIAGNOSIS — R42 DIZZY: ICD-10-CM

## 2025-03-08 NOTE — TELEPHONE ENCOUNTER
Kayley Lynch is a 56 y.o. female on day 10 of admission presenting with Nausea vomiting and diarrhea.      Subjective   No events overnight.  Patient seen and examined at bedside with  present.  Hospitalization and treatment plan reviewed with  at length.  Patient has no complaints.       Objective     Last Recorded Vitals  /69   Pulse 72   Temp 36.8 °C (98.2 °F)   Resp 14   Wt 66.5 kg (146 lb 9.7 oz)   SpO2 97%   Intake/Output last 3 Shifts:    Intake/Output Summary (Last 24 hours) at 3/8/2025 1318  Last data filed at 3/8/2025 1200  Gross per 24 hour   Intake 368 ml   Output 1775 ml   Net -1407 ml       Admission Weight  Weight: 59 kg (130 lb) (02/26/25 0159)    Daily Weight  03/07/25 : 66.5 kg (146 lb 9.7 oz)    Image Results  XR chest 1 view  Narrative: Interpreted By:  Peter Sanabria,   STUDY:  XR CHEST 1 VIEW 3/8/2025 5:10 am      INDICATION:  Signs/Symptoms:Daily CXR      COMPARISON:  03/07/2025      ACCESSION NUMBER(S):  [id number]      ORDERING CLINICIAN:  NIKKI YATES      TECHNIQUE:  Single AP view chest      FINDINGS:  Cardiomediastinal silhouette is within normal limits. There is  persistent mild patchy consolidation in the right midlung laterally  which is mildly improved. Also, improving infiltrate in the left  lower lung zone with mild generalized increased interstitial  prominence unchanged in the lower lung zones.      Impression: 1. Improving patchy consolidation in the right midlung laterally as  well as improving left basilar infiltrate. Follow-up to clearing.      Signed by: Peter Sanabria 3/8/2025 11:09 AM  Dictation workstation:   [id number]      Physical Exam  HENT:      Head: Normocephalic and atraumatic.      Nose: Nose normal.      Mouth/Throat:      Mouth: Mucous membranes are moist.      Pharynx: Oropharynx is clear.   Eyes:      Extraocular Movements: Extraocular movements intact.      Pupils: Pupils are equal, round, and reactive to light.  Patient calling in stating she came in for the flu and pneumonia vaccine and now is experiencing redness, soreness, and heat on her R-arm. Please call pt to discuss action steps for relief.   Cardiovascular:      Rate and Rhythm: Normal rate and regular rhythm.   Pulmonary:      Effort: No respiratory distress.      Breath sounds: Wheezing present. No rhonchi or rales.   Abdominal:      General: Bowel sounds are normal. There is no distension.      Palpations: Abdomen is soft.      Tenderness: There is no abdominal tenderness. There is no guarding.   Musculoskeletal:      Right lower leg: No edema.      Left lower leg: No edema.   Skin:     General: Skin is warm and dry.   Neurological:      Mental Status: She is alert. Mental status is at baseline.         Relevant Results               Assessment/Plan   This patient currently has cardiac telemetry ordered; if you would like to modify or discontinue the telemetry order, click here to go to the orders activity to modify/discontinue the order.      This patient has a urinary catheter   Reason for the urinary catheter remaining today? critically ill patient who need accurate urinary output measurements    Assessment & Plan  Nausea vomiting and diarrhea    Influenza A with pneumonia      Septic shock, dehydration, hypercapnic respiratory failure, and dehydration.  Nontraumatic compartment syndrome of left upper extremity      Patient is a 56-year-old female with past medical history of COPD, CAD status post CABG, pulmonary hypertension, hyperlipidemia, cardiomyopathy, a flutter status post ablation, and mitral valve replacement who presented with malaise.  She was found to be in septic shock and admitted to the ICU.    Septic shock  Hypercapnic respiratory failure  COPD exacerbation  Influenza A  Pneumonia  Acute metabolic encephalopathy  C. difficile colitis  Dehydration  HILLARY    -Continue supplemental oxygen  -Continue antibiotics, currently on doxycycline  -Continue nebulizers  -Continue steroids  -Continue Tamiflu  -Continue p.o. vancomycin  -Continue home meds  -Monitor INR    3/2: Patient developed a large hematoma in the left arm leading to  compartment syndrome. She was taken for urgent fasciotomies with Orthopedic Surgery. She is also being followed by Vascular Surgery. Vitamin K ordered for supratherapeutic INR. Her INR was 2.2 yesterday with goal being 2.5-3.5 for her A-flutter s/p mitral valve replacement. Critical Care reconsulted. Hematology consulted, appreciate recs. Hold coumadin and lovenox.    3/3: INR reversed. Patient is on low intensity heparin drip. She was transfused 1 unit pRBCs. Monitor hemoglobin closely. Hematology has ordered a work up including DIC panel. Patient will need another debridement and irrigation in 24-48 hours per Orthopedics. She has also developed a retroperitoneal bleed.    3/4: INR is down to 1.0.  Leukocytosis improved from 15 down to 13.  Hemoglobin is down to 6.4.  Patient to be transfused another unit packed RBCs.  She will be going back to the OR today for further debridement, washout, possible closure. She will also have a CT angiogram to determine if there is any active bleeding causing her retroperitoneal bleed.  She remains on a heparin drip due to her valve.  Continue doxycycline and p.o. Vanco.  Patient fully evaluated on March 5.  Patient restarted on heparin.  Fasciotomy site for compartment syndrome appears clear.  Continue to monitor lab work.  Case discussed with  at bedside.  No active bleeding is noted at this time.  Recheck labs in AM.       Patient fully evaluated  03/07  for    Problem List Items Addressed This Visit       * (Principal) Nausea vomiting and diarrhea    Influenza A with pneumonia - Primary    Nontraumatic compartment syndrome of left upper extremity    Relevant Orders    Case Request Operating Room: FASCIOTOMY, UPPER EXTREMITY (Completed)    Case Request Operating Room: DEBRIDEMENT, WOUND, UPPER EXTREMITY (Completed)     Other Visit Diagnoses       Acute kidney injury (CMS-HCC)        Dehydration        Influenza A        Shock (Multi)        Relevant Orders     Transthoracic Echo (TTE) Complete (Completed)          Patient seen resting in bed with head of bed elevated, no s/s or c/o acute difficulties at this time.  Vital signs for last 24 hours Temp:  [35.6 °C (96.1 °F)-36.8 °C (98.2 °F)] 36.8 °C (98.2 °F)  Heart Rate:  [69-78] 72  Resp:  [14-16] 14  BP: (107-155)/(56-69) 155/69  FiO2 (%):  [44 %] 44 %    I/O this shift:  In: -   Out: 1050 [Urine:800; Stool:250]  Patient still requiring frequent cardiac and SPO2 monitoring. Continue aggressive pulmonary hygiene and oral hygiene. Off loading as tolerated for skin integrity. Medications and labs reviewed-   Results for orders placed or performed during the hospital encounter of 02/25/25 (from the past 24 hours)   POCT GLUCOSE   Result Value Ref Range    POCT Glucose 112 (H) 74 - 99 mg/dL   CBC and Auto Differential   Result Value Ref Range    WBC 12.1 (H) 4.4 - 11.3 x10*3/uL    nRBC 0.2 (H) 0.0 - 0.0 /100 WBCs    RBC 3.11 (L) 4.00 - 5.20 x10*6/uL    Hemoglobin 8.5 (L) 12.0 - 16.0 g/dL    Hematocrit 27.7 (L) 36.0 - 46.0 %    MCV 89 80 - 100 fL    MCH 27.3 26.0 - 34.0 pg    MCHC 30.7 (L) 32.0 - 36.0 g/dL    RDW 19.0 (H) 11.5 - 14.5 %    Platelets 216 150 - 450 x10*3/uL    Neutrophils % 82.0 40.0 - 80.0 %    Immature Granulocytes %, Automated 0.9 0.0 - 0.9 %    Lymphocytes % 9.1 13.0 - 44.0 %    Monocytes % 7.7 2.0 - 10.0 %    Eosinophils % 0.2 0.0 - 6.0 %    Basophils % 0.1 0.0 - 2.0 %    Neutrophils Absolute 9.95 (H) 1.20 - 7.70 x10*3/uL    Immature Granulocytes Absolute, Automated 0.11 0.00 - 0.70 x10*3/uL    Lymphocytes Absolute 1.10 (L) 1.20 - 4.80 x10*3/uL    Monocytes Absolute 0.94 0.10 - 1.00 x10*3/uL    Eosinophils Absolute 0.02 0.00 - 0.70 x10*3/uL    Basophils Absolute 0.01 0.00 - 0.10 x10*3/uL   Comprehensive Metabolic Panel   Result Value Ref Range    Glucose 95 74 - 99 mg/dL    Sodium 140 136 - 145 mmol/L    Potassium 3.5 3.5 - 5.3 mmol/L    Chloride 102 98 - 107 mmol/L    Bicarbonate 35 (H) 21 - 32 mmol/L     Anion Gap 7 (L) 10 - 20 mmol/L    Urea Nitrogen 11 6 - 23 mg/dL    Creatinine 0.30 (L) 0.50 - 1.05 mg/dL    eGFR >90 >60 mL/min/1.73m*2    Calcium 8.3 (L) 8.6 - 10.3 mg/dL    Albumin 2.7 (L) 3.4 - 5.0 g/dL    Alkaline Phosphatase 36 33 - 110 U/L    Total Protein 4.9 (L) 6.4 - 8.2 g/dL    AST 17 9 - 39 U/L    Bilirubin, Total 1.3 (H) 0.0 - 1.2 mg/dL    ALT 12 7 - 45 U/L   Heparin Assay   Result Value Ref Range    Heparin Unfractionated 0.3 See Comment Below for Therapeutic Ranges IU/mL   POCT GLUCOSE   Result Value Ref Range    POCT Glucose 161 (H) 74 - 99 mg/dL   Protime-INR   Result Value Ref Range    Protime 12.9 (H) 9.8 - 12.4 seconds    INR 1.2 (H) 0.9 - 1.1      Patient recently received an antibiotic (last 12 hours)       Date/Time Action Medication Dose    03/07/25 1252 Given    vancomycin (Firvanq) solution 250 mg 250 mg    03/07/25 0602 Given    vancomycin (Firvanq) solution 250 mg 250 mg           Plan discussed with interdisciplinary team, seen by orthopedics today - dressing changes as needed,   suture removal on March 18, recommended shoulder elbow and hand range of motion with therapy ordered. Will continue current and repeat labs in the AM. CXR shows improved infiltrate at the left lower lung, strict intake and output, trend hemoglobin.    Discharge planning discussed with patient and care team. Therapy evaluations ordered. [facility] - anticipate C/SNF at discharge. Patient aware and agreeable to current plan, continue plan as above.     I spent a total of 50 minutes on the date of the service which included preparing to see the patient, face-to-face patient care, completing clinical documentation, obtaining and/or reviewing separately obtained history, performing a medically appropriate examination, counseling and educating the patient/family/caregiver, ordering medications, tests, or procedures, communicating with other HCPs (not separately reported), independently interpreting results (not  separately reported), communicating results to the patient/family/caregiver, and care coordination (not separately reported).        Patient fully evaluated  03/08  for    Problem List Items Addressed This Visit       * (Principal) Nausea vomiting and diarrhea    Influenza A with pneumonia - Primary    Nontraumatic compartment syndrome of left upper extremity    Relevant Orders    Case Request Operating Room: FASCIOTOMY, UPPER EXTREMITY (Completed)    Case Request Operating Room: DEBRIDEMENT, WOUND, UPPER EXTREMITY (Completed)     Other Visit Diagnoses       Acute kidney injury (CMS-HCC)        Dehydration        Influenza A        Shock (Multi)        Relevant Orders    Transthoracic Echo (TTE) Complete (Completed)          Patient seen resting in bed with head of bed elevated, no s/s or c/o acute difficulties at this time.  Vital signs for last 24 hours Temp:  [35.6 °C (96.1 °F)-36.8 °C (98.2 °F)] 36.8 °C (98.2 °F)  Heart Rate:  [69-78] 72  Resp:  [14-16] 14  BP: (107-155)/(56-69) 155/69  FiO2 (%):  [44 %] 44 %    I/O this shift:  In: -   Out: 1050 [Urine:800; Stool:250]  Patient still requiring frequent cardiac and SPO2 monitoring. Continue aggressive pulmonary hygiene and oral hygiene. Off loading as tolerated for skin integrity. Medications and labs reviewed-   Results for orders placed or performed during the hospital encounter of 02/25/25 (from the past 24 hours)   POCT GLUCOSE   Result Value Ref Range    POCT Glucose 112 (H) 74 - 99 mg/dL   CBC and Auto Differential   Result Value Ref Range    WBC 12.1 (H) 4.4 - 11.3 x10*3/uL    nRBC 0.2 (H) 0.0 - 0.0 /100 WBCs    RBC 3.11 (L) 4.00 - 5.20 x10*6/uL    Hemoglobin 8.5 (L) 12.0 - 16.0 g/dL    Hematocrit 27.7 (L) 36.0 - 46.0 %    MCV 89 80 - 100 fL    MCH 27.3 26.0 - 34.0 pg    MCHC 30.7 (L) 32.0 - 36.0 g/dL    RDW 19.0 (H) 11.5 - 14.5 %    Platelets 216 150 - 450 x10*3/uL    Neutrophils % 82.0 40.0 - 80.0 %    Immature Granulocytes %, Automated 0.9 0.0 - 0.9 %     Lymphocytes % 9.1 13.0 - 44.0 %    Monocytes % 7.7 2.0 - 10.0 %    Eosinophils % 0.2 0.0 - 6.0 %    Basophils % 0.1 0.0 - 2.0 %    Neutrophils Absolute 9.95 (H) 1.20 - 7.70 x10*3/uL    Immature Granulocytes Absolute, Automated 0.11 0.00 - 0.70 x10*3/uL    Lymphocytes Absolute 1.10 (L) 1.20 - 4.80 x10*3/uL    Monocytes Absolute 0.94 0.10 - 1.00 x10*3/uL    Eosinophils Absolute 0.02 0.00 - 0.70 x10*3/uL    Basophils Absolute 0.01 0.00 - 0.10 x10*3/uL   Comprehensive Metabolic Panel   Result Value Ref Range    Glucose 95 74 - 99 mg/dL    Sodium 140 136 - 145 mmol/L    Potassium 3.5 3.5 - 5.3 mmol/L    Chloride 102 98 - 107 mmol/L    Bicarbonate 35 (H) 21 - 32 mmol/L    Anion Gap 7 (L) 10 - 20 mmol/L    Urea Nitrogen 11 6 - 23 mg/dL    Creatinine 0.30 (L) 0.50 - 1.05 mg/dL    eGFR >90 >60 mL/min/1.73m*2    Calcium 8.3 (L) 8.6 - 10.3 mg/dL    Albumin 2.7 (L) 3.4 - 5.0 g/dL    Alkaline Phosphatase 36 33 - 110 U/L    Total Protein 4.9 (L) 6.4 - 8.2 g/dL    AST 17 9 - 39 U/L    Bilirubin, Total 1.3 (H) 0.0 - 1.2 mg/dL    ALT 12 7 - 45 U/L   Heparin Assay   Result Value Ref Range    Heparin Unfractionated 0.3 See Comment Below for Therapeutic Ranges IU/mL   POCT GLUCOSE   Result Value Ref Range    POCT Glucose 161 (H) 74 - 99 mg/dL   Protime-INR   Result Value Ref Range    Protime 12.9 (H) 9.8 - 12.4 seconds    INR 1.2 (H) 0.9 - 1.1      Patient recently received an antibiotic (last 12 hours)       Date/Time Action Medication Dose    03/08/25 1220 Given    vancomycin (Vancocin) capsule 250 mg 250 mg           Plan discussed with interdisciplinary team, seen by cardiology today, will bridge patient to coumadin, ptt/inr in am, appreciate input and agree with plan. LUE dressing is clean, dry and intact, swelling is noted, warm and well-perfused.  She is able to flex and extend the fingers and thumb. Will continue current and repeat labs in the AM.     Discharge planning discussed with patient and care team. Patient aware and  agreeable to current plan, continue plan as above.     I spent a total of 50 minutes on the date of the service which included preparing to see the patient, face-to-face patient care, completing clinical documentation, obtaining and/or reviewing separately obtained history, performing a medically appropriate examination, counseling and educating the patient/family/caregiver, ordering medications, tests, or procedures, communicating with other HCPs (not separately reported), independently interpreting results (not separately reported), communicating results to the patient/family/caregiver, and care coordination (not separately reported).   Ginny Willson

## 2025-03-10 RX ORDER — METOPROLOL SUCCINATE 25 MG/1
25 TABLET, EXTENDED RELEASE ORAL DAILY
Qty: 100 TABLET | Refills: 2 | Status: SHIPPED | OUTPATIENT
Start: 2025-03-10

## 2025-03-10 NOTE — TELEPHONE ENCOUNTER
Name from pharmacy: Metoprolol Succinate ER 25 MG Oral Tablet Extended Release 24 Hour         Will file in chart as: METOPROLOL SUCCINATE ER 25 MG Oral Tablet 24 Hr    Sig: TAKE 1 TABLET BY MOUTH DAILY    Disp: 100 tablet    Refills: 2    Start: 3/8/2025    Class: Normal    Non-formulary For: Essential hypertension, benign; Dizzy    To pharmacy: Please send a replace/new response with 100-Day Supply if appropriate to maximize member benefit. Requesting 1 year supply.    Last ordered: 1 month ago (1/27/2025) by Abebe Martinez MD    Last refill: 1/27/2025    Rx #: [id number]    Hypertension Medications Protocol [id number]/2025 09:31 PM   Protocol Details CMP or BMP in past 12 months    Last BP reading less than 140/90    In person appointment or virtual visit in the past 12 mos or appointment in next 3 mos    EGFRCR or GFRAA > 50    Medication is active on med list      To be filled at: [address] [phone], [phone]

## 2025-04-04 ENCOUNTER — OFFICE VISIT (OUTPATIENT)
Facility: LOCATION | Age: 80
End: 2025-04-04
Payer: COMMERCIAL

## 2025-04-04 DIAGNOSIS — R42 VERTIGO: Primary | ICD-10-CM

## 2025-04-04 PROCEDURE — 92567 TYMPANOMETRY: CPT | Performed by: AUDIOLOGIST

## 2025-04-04 PROCEDURE — 99204 OFFICE O/P NEW MOD 45 MIN: CPT | Performed by: OTOLARYNGOLOGY

## 2025-04-04 PROCEDURE — 92557 COMPREHENSIVE HEARING TEST: CPT | Performed by: AUDIOLOGIST

## 2025-04-04 PROCEDURE — 1160F RVW MEDS BY RX/DR IN RCRD: CPT | Performed by: OTOLARYNGOLOGY

## 2025-04-04 PROCEDURE — 1159F MED LIST DOCD IN RCRD: CPT | Performed by: OTOLARYNGOLOGY

## 2025-04-04 NOTE — PROGRESS NOTES
Cierra Saenz was seen for an audiometric evaluation and tympanogram today. Referred back to physician.    Hearing aid evaluation recommended post medical clearance.      [place] MS, CCC-A

## 2025-04-04 NOTE — PROGRESS NOTES
Cierra Saenz is a 79 year old female.   Chief Complaint   Patient presents with    Dizziness     HPI:   She has a history recurrent vertigo.  She has had it mildly off and on for 5 years.  She had a bad episode of vertigo in January lasted 1 week.  It is essentially resolved save for sometimes when she rolls on her right side she will get a little bit of dizziness which passes quickly.  Current Outpatient Medications   Medication Sig Dispense Refill    METOPROLOL SUCCINATE ER 25 MG Oral Tablet 24 Hr TAKE 1 TABLET BY MOUTH DAILY 100 tablet 2    TEMAZEPAM 15 MG Oral Cap TAKE 1 CAPSULE(15 MG) BY MOUTH EVERY NIGHT AS NEEDED FOR SLEEP 30 capsule 0    alendronate (FOSAMAX) 70 MG Oral Tab Take 1 tablet (70 mg total) by mouth once a week. 12 tablet 3    atorvastatin 10 MG Oral Tab Take 1 tablet (10 mg total) by mouth nightly. 90 tablet 3    cholecalciferol 50 MCG (2000 UT) Oral Cap Take 1 capsule (2,000 Units total) by mouth daily.      Vitamin C 500 MG Oral Tab Take 1 tablet (500 mg total) by mouth daily.      Melatonin 10 MG Oral Tab Take 10 mg by mouth at bedtime.      amLODIPine 5 MG Oral Tab Take 1 tablet (5 mg total) by mouth 2 (two) times daily. 180 tablet 3    triamterene-hydroCHLOROthiazide 37.5-25 MG Oral Cap Take 1 capsule by mouth every morning. 100 capsule 2    aspirin 81 MG Oral Tab Take 1 tablet (81 mg total) by mouth daily.      omega-3 fatty acids (FISH OIL) 1000 MG Oral Cap Take 1,000 mg by mouth daily.      Calcium 1500 MG Oral Tab Take 1 tablet by mouth 2 (two) times daily with meals.        Past Medical History:    Allergies    Arthritis    Arthritis of knee    Chest pain of uncertain etiology    Esophageal reflux    High blood pressure    Hyperlipidemia    Obesity    ASHWINI (obstructive sleep apnea)    Osteoarthritis    Osteoarthritis of both knees    Pes planus (flat feet)    Severe obesity (BMI >= 40) ([facility])    Sleep apnea    Unspecified essential hypertension    Visual impairment    glasses for  distance      Social History:  Social History     Socioeconomic History    Marital status:    Tobacco Use    Smoking status: Never    Smokeless tobacco: Never    Tobacco comments:     none   Vaping Use    Vaping status: Never Used   Substance and Sexual Activity    Alcohol use: Not Currently     Alcohol/week: 2.0 standard drinks of alcohol     Types: 2 Glasses of wine per week     Comment: twice a year    Drug use: No   Other Topics Concern    Caffeine Concern No    Exercise No    Seat Belt No    Special Diet No    Stress Concern No    Weight Concern No      Past Surgical History:   Procedure Laterality Date    Cholecystectomy  2002    Colonoscopy N/A 2017    Procedure: COLONOSCOPY;  Surgeon: Cihdi Espinosa MD;  Location:  ENDOSCOPY    Dexa bone density axial and scan of wrist(cpt=77080)  2013    Hysterectomy  1973    partial hystero.    Alda biopsy stereo nodule 1 site right (cpt=19081)  2017    benign    Needle biopsy right Right 2021    1 SITE, STROMAL FIBROSIS w/ALH      3    . and     Oophorectomy  1973    Total abdom hysterectomy           REVIEW OF SYSTEMS:   GENERAL HEALTH: feels well otherwise  GENERAL : denies fever, chills, sweats, weight loss, weight gain  SKIN: denies any unusual skin lesions or rashes  RESPIRATORY: denies shortness of breath with exertion  NEURO: denies headaches    EXAM:   There were no vitals taken for this visit.    System Findings Details   Constitutional  Overall appearance - Normal.   Psychiatric  Orientation - Oriented to time, place, person & situation. Appropriate mood and affect.   Head/Face  Facial features -- Normal. Skull - Normal.   Eyes  Pupils equal ,round ,react to light and accomidate   Ears, Nose, Throat, Neck  Ears clear no fluid nose clear oropharynx pharyngeal narrowing neck no masses   Neurological  Memory - Normal. Cranial nerves - Cranial nerves II through XII grossly intact.   Lymph Detail  Submental.  Submandibular. Anterior cervical. Posterior cervical. Supraclavicular.     Latest Audiogram Result (Hz) Exam performed: 4/4/2025 10:04 AM Last edited by Nishi [place] MS, CCC-A on 4/4/2025 10:20 AM        125 250  1500 2000 3000 4000 6000 8000    Right air:  15 15  20 20 30 45 60 55 80    Left air:  10 15  20 35 40 50 65 65 75    Right mastoid bone:   15  15  30  60      Left mastoid bone:   15  15  30  60         Reliability:  Good    Transducer:  Headphones    Technique:  Conventional Audiometry    Comments:            Latest Speech Audiometry  Last edited by Nishi [place] MS, CCC-A on 4/4/2025 10:16 AM       Ear Method PTA SAT SRT [facility] Test/list Score (%) Intensity Mask/noise Notes    right    20   W-22 96 60 35     left    20   W-22 84 60 35                   Latest Tympanogram Result       Probe Tone (Hz): Unknown Exam performed: 4/4/2025 10:04 AM Last edited by Nishi [place] MS, CCC-A on 4/4/2025 10:20 AM      Tympanograms  These were drawn by a user, not generated from device data      Right Ear Left Ear                     Right Ear Left Ear    Tympanogram type:      Canal volume (mL): 1.38 1.43    Peak pressure (daPa): -57 -39    Peak amplitude (mL): 0.34 0.21    Tympanogram width (daPa):        Comments:                    Latest Audiogram and Tympanogram Result Text  Last edited by Nishi Bay MS, CCC-A on 4/4/2025 10:20 AM      Study Result                 Narrative & Impression  Patient has a history of vertigo for approx 5 years.  She had an episode in Jan 2025 for about a week which caused her to vomit.  No reported hearing loss or tinnitus.  She reports feeling lightheaded when she lays on her right side at night.    Audiogram: Mild sloping to severe SNHL bilaterally.  WR ability is excellent right ear and good left ear.    Tympanogram:  Left - Shallow   Right - WNL    Back to MD.  Hearing aid evaluation recommended.  Patient is not interested at this time.                    ASSESSMENT AND PLAN:   1. Vertigo  Audiogram reviewed which shows symmetrical high-frequency hearing loss consistent with sensory presbycusis.  I do not think there is a serious otologic etiology causing her vertigo.  More likely she has been dealing with some mild benign paroxysmal positional vertigo.  Her symptoms have improved.  If they should worsen she will let me know.      The patient indicates understanding of these issues and agrees to the plan.    No follow-ups on file.    Jeff Everett MD  4/4/2025  10:45 AM

## 2025-04-07 ENCOUNTER — PATIENT MESSAGE (OUTPATIENT)
Dept: INTERNAL MEDICINE CLINIC | Facility: CLINIC | Age: 80
End: 2025-04-07

## 2025-04-15 RX ORDER — TRIAMTERENE AND HYDROCHLOROTHIAZIDE 37.5; 25 MG/1; MG/1
1 CAPSULE ORAL EVERY MORNING
Qty: 100 CAPSULE | Refills: 2 | Status: SHIPPED | OUTPATIENT
Start: 2025-04-15

## 2025-04-15 NOTE — TELEPHONE ENCOUNTER
Name from pharmacy: Triamterene-HCTZ 37.5-25 MG Oral Capsule         Will file in chart as: TRIAMTERENE-HYDROCHLOROTHIAZIDE 37.5-25 MG Oral Cap    Sig: Take 1 capsule by mouth every morning.    Original sig: TAKE 1 CAPSULE BY MOUTH EVERY  MORNING    Disp: 100 capsule    Refills: 2    Start: 4/15/2025    Class: Normal    Non-formulary    To pharmacy: Please send a replace/new response with 100-Day Supply if appropriate to maximize member benefit. Requesting 1 year supply.    Last ordered: 1 year ago (4/5/2024) by Abebe Martinez MD    Last refill: 2/5/2025    Rx #: [id number]    Hypertension Medications Protocol Cpdhkl89/15/2025 04:05 AM   Protocol Details CMP or BMP in past 12 months    Last BP reading less than 140/90    In person appointment or virtual visit in the past 12 mos or appointment in next 3 mos    EGFRCR or GFRAA > 50    Medication is active on med list      To be filled at: [facility] - [address] [phone], [phone]

## 2025-07-01 ENCOUNTER — OFFICE VISIT (OUTPATIENT)
Dept: INTERNAL MEDICINE CLINIC | Facility: CLINIC | Age: 80
End: 2025-07-01
Payer: COMMERCIAL

## 2025-07-01 VITALS
RESPIRATION RATE: 16 BRPM | DIASTOLIC BLOOD PRESSURE: 70 MMHG | OXYGEN SATURATION: 97 % | HEART RATE: 60 BPM | WEIGHT: 270 LBS | HEIGHT: 63 IN | BODY MASS INDEX: 47.84 KG/M2 | TEMPERATURE: 98 F | SYSTOLIC BLOOD PRESSURE: 126 MMHG

## 2025-07-01 DIAGNOSIS — G47.33 OBSTRUCTIVE SLEEP APNEA: Primary | Chronic | ICD-10-CM

## 2025-07-01 DIAGNOSIS — E78.00 PURE HYPERCHOLESTEROLEMIA: Chronic | ICD-10-CM

## 2025-07-01 DIAGNOSIS — I10 ESSENTIAL HYPERTENSION, BENIGN: Chronic | ICD-10-CM

## 2025-07-01 DIAGNOSIS — E55.9 VITAMIN D DEFICIENCY: ICD-10-CM

## 2025-07-01 DIAGNOSIS — K21.9 GASTROESOPHAGEAL REFLUX DISEASE, UNSPECIFIED WHETHER ESOPHAGITIS PRESENT: ICD-10-CM

## 2025-07-01 DIAGNOSIS — R73.03 PREDIABETES: ICD-10-CM

## 2025-07-01 DIAGNOSIS — F51.01 PRIMARY INSOMNIA: ICD-10-CM

## 2025-07-01 DIAGNOSIS — M85.89 OSTEOPENIA OF MULTIPLE SITES: ICD-10-CM

## 2025-07-01 DIAGNOSIS — R60.0 LOWER EXTREMITY EDEMA: ICD-10-CM

## 2025-07-01 PROCEDURE — 3074F SYST BP LT 130 MM HG: CPT | Performed by: INTERNAL MEDICINE

## 2025-07-01 PROCEDURE — 3008F BODY MASS INDEX DOCD: CPT | Performed by: INTERNAL MEDICINE

## 2025-07-01 PROCEDURE — G2211 COMPLEX E/M VISIT ADD ON: HCPCS | Performed by: INTERNAL MEDICINE

## 2025-07-01 PROCEDURE — 3078F DIAST BP <80 MM HG: CPT | Performed by: INTERNAL MEDICINE

## 2025-07-01 PROCEDURE — 99214 OFFICE O/P EST MOD 30 MIN: CPT | Performed by: INTERNAL MEDICINE

## 2025-07-01 PROCEDURE — 1159F MED LIST DOCD IN RCRD: CPT | Performed by: INTERNAL MEDICINE

## 2025-07-01 RX ORDER — HYDRALAZINE HYDROCHLORIDE 25 MG/1
25 TABLET, FILM COATED ORAL 3 TIMES DAILY
COMMUNITY
End: 2025-07-01 | Stop reason: ALTCHOICE

## 2025-07-01 RX ORDER — LEVOCETIRIZINE DIHYDROCHLORIDE 5 MG/1
5 TABLET, FILM COATED ORAL EVERY EVENING
COMMUNITY
Start: 2025-06-11

## 2025-07-01 RX ORDER — PANTOPRAZOLE SODIUM 40 MG/1
40 TABLET, DELAYED RELEASE ORAL
COMMUNITY

## 2025-07-01 NOTE — PATIENT INSTRUCTIONS
Lets continue your current medications and I have ordered blood work for you    See me back in a month for physical

## 2025-07-01 NOTE — PROGRESS NOTES
Patient Office Visit    ASSESSMENT AND PLAN:   1. Obstructive sleep apnea  Note: Has a follow-up appointment for titration study coming up    2. Essential hypertension, benign  Note: Continue amlodipine and metoprolol  - Basic Metabolic Panel (8); Future  - TSH W Reflex To Free T4; Future    3. Pure hypercholesterolemia  Note: Continue statin  - ALT(SGPT); Future  - AST (SGOT); Future  - Lipid Panel; Future    4. Primary insomnia  Note: Takes been temazepam as needed    5. Lower extremity edema  Note: Takes triamterene/hydrochlorothiazide as needed    6. Prediabetes  Note: Diet controlled  - Hemoglobin A1C [E]; Future    7.  GERD  Note: Takes pantoprazole as needed    8.  Osteopenia  Note: Continue alendronate    Return to clinic in a month for physical    Patient/Caregiver Education: Patient/Caregiver Education: There are no barriers to learning. Medical education done. Outcome: Patient verbalizes understanding. Patient is notified to call with any questions, complications, allergies, or worsening or changing symptoms.  Patient is to call with any side effects or complications from the treatments as a result of today.      Reviewed Past Medical History and   Problem List[1]    No orders of the defined types were placed in this encounter.    Requested Prescriptions      No prescriptions requested or ordered in this encounter         Nelli Rutherford DO  CC:  Chief Complaint   Patient presents with    [facility]         HPI:   Cierra Saenz is a 80-year-old female who presents for routine follow-up.  She will schedule her physical in the future    Sleep apnea: She will need to go back for a titration study  Hyperlipidemia/hypertension: Stable on medicine  Primary insomnia: She takes the temazepam only as needed.  30 tablets lasts her a whole year  Lower extremity edema: Takes triamterene/hydrochlorothiazide as needed    Past Medical History[2]    Past Surgical History[3]    Social History:  Short Social  Hx on File[4]  Family History:  Family History[5]  Allergies:  Allergies[6]  Current Meds:  Medications Ordered Prior to Encounter[7]      REVIEW OF SYSTEMS   Constitutional: no fatigue normal energy no weight changes   HENT: normal sinuses and no mouth issues   Eyes: . normal vision no eye pain   Respiratory: normal respirations no cough   Cardiovascular: no CP, or palpitations   Gastrointestinal: normal bowels and no abd pains   Genitourinary:  normal urination no hematuria, no frequency   Musculoskeletal: no pains in arms/legs, normal range of motion   Skin: no rashes or skin lesions that are new   Neurological:  no weakness, no numbness, normal gait   Hematological:  no bruises or bleeding   Psychiatric/Behavioral: normal mood no anxiety normal behavior     /70 (BP Location: Right arm, Patient Position: Sitting, Cuff Size: large)   Pulse 60   Temp 97.8 °F (36.6 °C) (Temporal)   Resp 16   Ht 5' 3\" (1.6 m)   Wt 270 lb (122.5 kg)   SpO2 97%   BMI 47.83 kg/m²     PHYSICAL EXAM:   Constitutional: Vital signs reviewed as noted, well developed, in no acute distress.   HENT: NCAT  Eyes: pupils reactive bilaterally  Cardiovascular: nl s1 s2 no m/r/g  Pulmonary/Chest: CTA bilaterally with no wheezes  Extremities: no pedal edema   Neurological:  no weakness in UE and LE, reflexes are normal  Skin: no rashes or bruises on visualized skin, not undressed   Psychiatric:normal mood              [1]   Patient Active Problem List  Diagnosis    Pure hypercholesterolemia    Essential hypertension, benign    Obstructive sleep apnea    Class 3 severe obesity with serious comorbidity and body mass index (BMI) of 45.0 to 49.9 in adult    Primary osteoarthritis involving multiple joints    Primary insomnia   [2]   Past Medical History:   Allergies    Arthritis    Arthritis of knee    Chest pain of uncertain etiology    Esophageal reflux    High blood pressure    Hyperlipidemia    Obesity    ASHWINI (obstructive sleep apnea)     Osteoarthritis    Osteoarthritis of both knees    Pes planus (flat feet)    Severe obesity (BMI >= 40) (HCC)    Sleep apnea    Unspecified essential hypertension    Visual impairment    glasses for distance   [3]   Past Surgical History:  Procedure Laterality Date    Cholecystectomy      Colonoscopy N/A 2017    Procedure: COLONOSCOPY;  Surgeon: Chidi Espinosa MD;  Location:  ENDOSCOPY    Dexa bone density axial and scan of wrist(cpt=77080)  2013    Hysterectomy  1973    partial hystero.    Alda biopsy stereo nodule 1 site right (cpt=19081)  2017    benign    Needle biopsy right Right 2021    1 SITE, STROMAL FIBROSIS w/ALH      3    . and     Oophorectomy  1973    Total abdom hysterectomy     [4]   Social History  Socioeconomic History    Marital status:    Tobacco Use    Smoking status: Never    Smokeless tobacco: Never    Tobacco comments:     none   Vaping Use    Vaping status: Never Used   Substance and Sexual Activity    Alcohol use: Not Currently     Alcohol/week: 2.0 standard drinks of alcohol     Types: 2 Glasses of wine per week     Comment: twice a year    Drug use: No   Other Topics Concern    Caffeine Concern No    Exercise No    Seat Belt No    Special Diet No    Stress Concern No    Weight Concern No   [5]   Family History  Problem Relation Age of Onset    Colon Cancer Mother     Cancer Mother         Colon    Diabetes Father     Cancer Sister         breast    Breast Cancer Sister 64    Cancer Sister         Breast    Cancer Brother         Lung Cancer    [6]   Allergies  Allergen Reactions    Ace Inhibitors Coughing    Metformin DIARRHEA   [7]   Current Outpatient Medications on File Prior to Visit   Medication Sig Dispense Refill    levocetirizine 5 MG Oral Tab Take 1 tablet (5 mg total) by mouth every evening.      pantoprazole 40 MG Oral Tab EC Take 1 tablet (40 mg total) by mouth every morning before breakfast.       TRIAMTERENE-HYDROCHLOROTHIAZIDE 37.5-25 MG Oral Cap TAKE 1 CAPSULE BY MOUTH EVERY  MORNING 100 capsule 2    METOPROLOL SUCCINATE ER 25 MG Oral Tablet 24 Hr TAKE 1 TABLET BY MOUTH DAILY 100 tablet 2    TEMAZEPAM 15 MG Oral Cap TAKE 1 CAPSULE(15 MG) BY MOUTH EVERY NIGHT AS NEEDED FOR SLEEP 30 capsule 0    alendronate (FOSAMAX) 70 MG Oral Tab Take 1 tablet (70 mg total) by mouth once a week. 12 tablet 3    atorvastatin 10 MG Oral Tab Take 1 tablet (10 mg total) by mouth nightly. 90 tablet 3    cholecalciferol 50 MCG (2000 UT) Oral Cap Take 1 capsule (2,000 Units total) by mouth daily.      Vitamin C 500 MG Oral Tab Take 1 tablet (500 mg total) by mouth daily.      Melatonin 10 MG Oral Tab Take 10 mg by mouth at bedtime.      amLODIPine 5 MG Oral Tab Take 1 tablet (5 mg total) by mouth 2 (two) times daily. 180 tablet 3    aspirin 81 MG Oral Tab Take 1 tablet (81 mg total) by mouth daily.      omega-3 fatty acids (FISH OIL) 1000 MG Oral Cap Take 1,000 mg by mouth daily.      Calcium 1500 MG Oral Tab Take 1 tablet by mouth 2 (two) times daily with meals.       No current facility-administered medications on file prior to visit.

## 2025-07-16 DIAGNOSIS — I10 ESSENTIAL HYPERTENSION, BENIGN: Chronic | ICD-10-CM

## 2025-07-16 RX ORDER — AMLODIPINE BESYLATE 5 MG/1
5 TABLET ORAL 2 TIMES DAILY
Qty: 180 TABLET | Refills: 3 | Status: SHIPPED | OUTPATIENT
Start: 2025-07-16 | End: 2026-07-16

## 2025-07-16 NOTE — TELEPHONE ENCOUNTER
amLODIPine 5 MG Oral Tab         Sig: Take 1 tablet (5 mg total) by mouth 2 (two) times daily.    Disp: 180 tablet    Refills: 3    Start: 7/16/2025 - 7/16/2026    Class: Normal    Non-formulary For: Essential hypertension, benign    To pharmacy: Please send a replace/new response with 100-Day Supply if appropriate to maximize member benefit. Requesting 1 year supply.    Last ordered: 11 months ago (8/15/2024) by Abebe Martinez MD    Hypertension Medications Protocol [id number]/16/2025 08:37 AM   Protocol Details CMP or BMP in past 12 months    Last BP reading less than 140/90    In person appointment or virtual visit in the past 12 mos or appointment in next 3 mos    EGFRCR or GFRAA > 50    Medication is active on med list      To be filled at: [facility] - [address] [phone], [phone]        LOV:  7/1/25  RTC: 6 months  Recent Labs: 11/4/24     Upcoming OV  none

## 2025-07-29 ENCOUNTER — PATIENT MESSAGE (OUTPATIENT)
Dept: INTERNAL MEDICINE CLINIC | Facility: CLINIC | Age: 80
End: 2025-07-29

## 2025-07-29 DIAGNOSIS — M54.2 NECK PAIN: Primary | ICD-10-CM

## 2025-07-30 RX ORDER — NAPROXEN 500 MG/1
500 TABLET ORAL 2 TIMES DAILY WITH MEALS
Qty: 60 TABLET | Refills: 0 | Status: SHIPPED | OUTPATIENT
Start: 2025-07-30

## 2025-08-05 ENCOUNTER — PATIENT MESSAGE (OUTPATIENT)
Dept: INTERNAL MEDICINE CLINIC | Facility: CLINIC | Age: 80
End: 2025-08-05

## 2025-08-05 DIAGNOSIS — F51.02 ADJUSTMENT INSOMNIA: ICD-10-CM

## 2025-08-06 RX ORDER — TEMAZEPAM 15 MG/1
15 CAPSULE ORAL NIGHTLY PRN
Qty: 30 CAPSULE | Refills: 0 | Status: SHIPPED | OUTPATIENT
Start: 2025-08-06

## (undated) DEVICE — MEDI-VAC NON-CONDUCTIVE SUCTION TUBING: Brand: CARDINAL HEALTH

## (undated) DEVICE — Device: Brand: DEFENDO AIR/WATER/SUCTION AND BIOPSY VALVE

## (undated) DEVICE — 3M™ RED DOT™ MONITORING ELECTRODE WITH FOAM TAPE AND STICKY GEL, 50/BAG, 20/CASE, 72/PLT 2570: Brand: RED DOT™

## (undated) DEVICE — MEDI-VAC SUCTION HANDLE REGULAR CAPACITY: Brand: CARDINAL HEALTH

## (undated) DEVICE — FORCEP BIOPSY RJ4 LG CAP W/ND

## (undated) DEVICE — ENDOSCOPY PACK - LOWER: Brand: MEDLINE INDUSTRIES, INC.

## (undated) DEVICE — ENDOSCOPY PACK UPPER: Brand: MEDLINE INDUSTRIES, INC.

## (undated) DEVICE — 1200CC GUARDIAN II: Brand: GUARDIAN

## (undated) DEVICE — FILTERLINE NASAL ADULT O2/CO2

## (undated) NOTE — MR AVS SNAPSHOT
[place]  [address] [facility] [address]  [address] [facility] [address] [phone]               Thank you for choosing us for your health care visit with Salina Dean MD.  We are glad to serve you and happy to provide you with this patton This list is accurate as of: 4/20/17  2:44 PM.  Always use your most recent med list.                AmLODIPine Besylate 5 MG Tabs   Take 1 tablet (5 mg total) by mouth once daily.    Commonly known as:  NORVASC           aspirin 81 MG Tabs   Take 81 mg by

## (undated) NOTE — LETTER
[facility]  [address] Patel [address] [facility], [address] [place] [address]    Consent for Operation    Date: __________________    Time: _______________    1.  I authorize the performance upon Dmitry Berry the following operation:    Procedure(s):  ESOPHAGOGASTRODUODE procedure has been videotaped, the surgeon will obtain the original videotape. The hospital will not be responsible for storage or maintenance of this tape.     6. For the purpose of advancing medical education, I consent to the admittance of observers to t STATEMENTS REQUIRING INSERTION OR COMPLETION WERE FILLED IN.     Signature of Patient:   ___________________________    When the patient is a minor or mentally incompetent to give consent:  Signature of person authorized to consent for patient: ____________ supplements, and pills I can buy without a prescription (including street drugs/illegal medications). Failure to inform my anesthesiologist about these medicines may increase my risk of anesthetic complications.   · If I am allergic to anything or have had Anesthesiologist Signature     Date   Time  I have discussed the procedure and information above with the patient (or patient’s representative) and answered their questions. The patient or their representative has agreed to have anesthesia services.     ___

## (undated) NOTE — ED AVS SNAPSHOT
Earle Morales   MRN: [id number]    Department:  [facility] Emergency Department   Date of Visit:  6/25/2018           Disclosure     Insurance plans vary and the physician(s) referred by the ER may not be covered by your plan.  Please contact yo tell this physician (or your personal doctor if your instructions are to return to your personal doctor) about any new or lasting problems. The primary care or specialist physician will see patients referred from the [facility] Emergency Department.  Fadi Blackman

## (undated) NOTE — MR AVS SNAPSHOT
[place]  [address] [facility] [facility] [address]  [address] [facility] [address] [phone]               Thank you for choosing us for your health care visit with Frank Haskins MD.  We are glad to serve you and happy to provide you with this patton clinic staff will provide you with the phone number you should call to schedule your appointment.      If you are confident that your benefit plan will not require a referral or authorization, such as [place], please feel free to schedule your cristopher Take 1,000 mg by mouth daily. Commonly known as:  FISH OIL           Potassium Chloride ER 20 MEQ Tbcr   1 tab bid           TraZODone HCl 50 MG Tabs   Take 1 tablet (50 mg total) by mouth nightly.    Commonly known as:  DESYREL           Vitamin D 2000 u

## (undated) NOTE — Clinical Note
Thank you for referring Andrés Hartley to the [facility]. I met with her in consultation today via person. I have referred for a nutrition consultation with our dietician, and [address] [place] [address]. Reviewed home chair exercise resources. She was started on Metformin ER for medication therapy and will follow-up with me in about 2-3 months.

## (undated) NOTE — LETTER
09/24/20        Justina Esteves  [place] [address]      Dear Tyler Robert,    [address] [facility] records indicate that you have outstanding lab work and or testing that was ordered for you and has not yet been completed: Fasting lab work - (at [employer]

## (undated) NOTE — MR AVS SNAPSHOT
[place]  [address] [facility] [address]  [address] [facility] [id number] [phone]               Thank you for choosing us for your health care visit with Maria Sinclair MD.  We are glad to serve you and happy to provide you with this patton AmLODIPine Besylate 5 MG Tabs   Take 1 tablet (5 mg total) by mouth once daily. Commonly known as:  NORVASC           aspirin 81 MG Tabs   Take 81 mg by mouth daily.            Calcium 1500 MG Tabs   Take 1 tablet by mouth 2 (two) times daily

## (undated) NOTE — MR AVS SNAPSHOT
[place]  [address] [facility] [address]  [address] [facility] [address] [phone]               Thank you for choosing us for your health care visit with Leanna Durham MD.  We are glad to serve you and happy to provide you with this patton See Me for CPX after September 1       Allergies as of May 30, 2017     Ace Inhibitors                 Today's Vital Signs     BP Pulse Temp Height Weight BMI    130/80 mmHg 76 97.8 °F (36.6 °C) (Oral) 64\" 261 lb 44.78 kg/m2         Current Medications